# Patient Record
Sex: FEMALE | Race: WHITE | Employment: OTHER | URBAN - METROPOLITAN AREA
[De-identification: names, ages, dates, MRNs, and addresses within clinical notes are randomized per-mention and may not be internally consistent; named-entity substitution may affect disease eponyms.]

---

## 2018-10-10 ENCOUNTER — HOSPITAL ENCOUNTER (OUTPATIENT)
Dept: SURGERY | Age: 78
Discharge: HOME | End: 2018-10-10
Payer: MEDICARE

## 2018-10-10 VITALS
WEIGHT: 125.06 LBS | DIASTOLIC BLOOD PRESSURE: 59 MMHG | OXYGEN SATURATION: 100 % | HEIGHT: 61 IN | SYSTOLIC BLOOD PRESSURE: 145 MMHG | RESPIRATION RATE: 16 BRPM | BODY MASS INDEX: 23.61 KG/M2 | TEMPERATURE: 98.3 F | HEART RATE: 90 BPM

## 2018-10-10 LAB
ANION GAP SERPL CALC-SCNC: 6 MMOL/L (ref 7–16)
ATRIAL RATE: 80 BPM
BACTERIA SPEC CULT: ABNORMAL
BUN SERPL-MCNC: 14 MG/DL (ref 8–23)
CALCIUM SERPL-MCNC: 9.1 MG/DL (ref 8.3–10.4)
CALCULATED P AXIS, ECG09: 76 DEGREES
CALCULATED R AXIS, ECG10: 81 DEGREES
CALCULATED T AXIS, ECG11: 76 DEGREES
CHLORIDE SERPL-SCNC: 107 MMOL/L (ref 98–107)
CO2 SERPL-SCNC: 28 MMOL/L (ref 21–32)
CREAT SERPL-MCNC: 0.71 MG/DL (ref 0.6–1)
DIAGNOSIS, 93000: NORMAL
ERYTHROCYTE [DISTWIDTH] IN BLOOD BY AUTOMATED COUNT: 12.4 %
GLUCOSE SERPL-MCNC: 84 MG/DL (ref 65–100)
HCT VFR BLD AUTO: 42.5 % (ref 35.8–46.3)
HGB BLD-MCNC: 13.5 G/DL (ref 11.7–15.4)
MCH RBC QN AUTO: 29.9 PG (ref 26.1–32.9)
MCHC RBC AUTO-ENTMCNC: 31.8 G/DL (ref 31.4–35)
MCV RBC AUTO: 94 FL (ref 79.6–97.8)
NRBC # BLD: 0 K/UL (ref 0–0.2)
P-R INTERVAL, ECG05: 168 MS
PLATELET # BLD AUTO: 256 K/UL (ref 150–450)
PMV BLD AUTO: 9.8 FL (ref 9.4–12.3)
POTASSIUM SERPL-SCNC: 4.3 MMOL/L (ref 3.5–5.1)
Q-T INTERVAL, ECG07: 380 MS
QRS DURATION, ECG06: 82 MS
QTC CALCULATION (BEZET), ECG08: 438 MS
RBC # BLD AUTO: 4.52 M/UL (ref 4.05–5.2)
SERVICE CMNT-IMP: ABNORMAL
SODIUM SERPL-SCNC: 141 MMOL/L (ref 136–145)
VENTRICULAR RATE, ECG03: 80 BPM
WBC # BLD AUTO: 6.5 K/UL (ref 4.3–11.1)

## 2018-10-10 PROCEDURE — 85027 COMPLETE CBC AUTOMATED: CPT

## 2018-10-10 PROCEDURE — 80048 BASIC METABOLIC PNL TOTAL CA: CPT

## 2018-10-10 PROCEDURE — 93005 ELECTROCARDIOGRAM TRACING: CPT | Performed by: ANESTHESIOLOGY

## 2018-10-10 PROCEDURE — 87641 MR-STAPH DNA AMP PROBE: CPT

## 2018-10-10 RX ORDER — CALCIUM CARBONATE 600 MG
600 TABLET ORAL DAILY
COMMUNITY

## 2018-10-10 RX ORDER — GUAIFENESIN 100 MG/5ML
81 LIQUID (ML) ORAL DAILY
COMMUNITY

## 2018-10-10 NOTE — PERIOP NOTES
Recent Results (from the past 12 hour(s)) CBC W/O DIFF Collection Time: 10/10/18 11:36 AM  
Result Value Ref Range WBC 6.5 4.3 - 11.1 K/uL  
 RBC 4.52 4.05 - 5.2 M/uL  
 HGB 13.5 11.7 - 15.4 g/dL HCT 42.5 35.8 - 46.3 % MCV 94.0 79.6 - 97.8 FL  
 MCH 29.9 26.1 - 32.9 PG  
 MCHC 31.8 31.4 - 35.0 g/dL  
 RDW 12.4 % PLATELET 132 408 - 809 K/uL MPV 9.8 9.4 - 12.3 FL ABSOLUTE NRBC 0.00 0.0 - 0.2 K/uL METABOLIC PANEL, BASIC Collection Time: 10/10/18 11:36 AM  
Result Value Ref Range Sodium 141 136 - 145 mmol/L Potassium 4.3 3.5 - 5.1 mmol/L Chloride 107 98 - 107 mmol/L  
 CO2 28 21 - 32 mmol/L Anion gap 6 (L) 7 - 16 mmol/L Glucose 84 65 - 100 mg/dL BUN 14 8 - 23 MG/DL Creatinine 0.71 0.6 - 1.0 MG/DL  
 GFR est AA >60 >60 ml/min/1.73m2 GFR est non-AA >60 >60 ml/min/1.73m2 Calcium 9.1 8.3 - 10.4 MG/DL  
MSSA/MRSA SC BY PCR, NASAL SWAB Collection Time: 10/10/18 11:36 AM  
Result Value Ref Range Special Requests: NO SPECIAL REQUESTS Culture result: (A) MRSA target DNA not detected, SA target DNA detected. A MRSA negative, SA positive test result does not preclude MRSA nasal colonization.

## 2018-10-10 NOTE — PERIOP NOTES
Patient verified name and . Patient provided medical/health information and PTA medications to the best of their ability. TYPE  CASE:lll Order for consent No found in EHR and matches case posting. Labs per surgeon:none. Labs per anesthesia protocol: CBC, BMP, EKG, MRSA swab, T&S on DOS. EKG  :  Today, NSR. Patient denies any cardiac hx Patient provided with and instructed on education handouts including Guide to Surgery, blood transfusions, pain management, and hand hygiene for the family and community, and [facility] brochure. Antibacterial soap and Hibiclens instructions given per hospital policy. Instructed patient to continue previous medications as prescribed prior to surgery unless otherwise directed and to take the following medications the day of surgery according to anesthesia guidelines : ASA 81 mg . Instructed patient to hold  the following medications: Vitamins / Supplements. Original medication prescription bottles were visualized during patient appointment. Patient teach back successful and patient demonstrates knowledge of instruction.

## 2018-10-10 NOTE — PERIOP NOTES
Mupirocin ointment called in to [facility] at [phone]. Called patient and instructed to apply nasally BID starting tonight 10/10/2018 at bedtime for a total of 11 doses and to swab nose the morning of surgery. Patient voices understanding.

## 2018-10-14 NOTE — H&P
[place] History and physical 
 
Subjective Problem List:. 1)Right ankle pain. 2)Great toe pain on right foot. 3)posterior tibialis tendonitis. 4)Hallux Rigidus. 5)right shoulder pain/impingement. 6)neck pain. 7)left ankle pain. 8)bilateral hip pain/bursitis. 9)Right knee pain/DJD/ patella chondromalacia. 10) left thumb pain. 11) right hip pain. This patient presents today for evaluation of right knee pain. The patient comes in today for evaluation, history and physical, and surgical consent signing. The surgical procedure was reviewed in detail with the patient. The risks, including but not limited to anesthesia, infection, deep vein thrombosis, pulmonary embolus, injury to vessels, tendons, and nerves, paralysis, stroke, heart attack, loss of limb, and death were discussed. The patient understands the post operative course and all questions were answered. No guarantees are made and all alternatives are given. The patient wishes to proceed with the surgery. Appropriate literature and relevant material was reviewed with the patient. Surgical procedure: right total knee replacement Allergies:  Actonel, boniva, codeine, penicillin, reclast, topamax Family health history: diabetes-father. Paula Chavez Major events: cholecystectomy; breast biopsy; BETTY/BSO; Cataracts; Hysterectomy 1989; left thumb surgery 2016. Ongoing medical problems: glaucoma; menopause; cataracts; migraines; IBS; cervical DDD; osteoporosis. Social history: Patient denies tobacco and ETOH use. Patient is . She is a homemaker. Review of systems:.  
 
General: Denies weight change, generally healthy,  change in strength or exercise tolerance. Paula Chavez Head: Denies headaches,  vertigo,  injury. Paula Chavez Eyes: Denies changes in vision. Ears:  Denies change in hearing. Nose: Denies epistaxis,  obstruction,  discharge. Paula Chavez Mouth: Denies dental difficulties. Neck: Denies pain or stiffness. Chest: Denies cough or SOB. Heart: Denies chest pains,  palpitations. Abdomen: Denies change in appetite  constipation or diarrhea. : Denies urinary urgency,  dysuria, change in nature of urine. Rayna Pan Neurologic: Denies weakness, seizures or paralysis. Psychiatric: Denies depressive symptoms, changes in sleep habits,  changes in thought content. Rayna Pan Objective Vital signs: Height 62 inches; Weight 123 lbs; /75 mmHg; Temp 97.5 F; Pulse 96 bpm; Oxygen Saturation 95 %. Rayna Pan Patient is a 70year old female who appears her given age and is in no apparent distress. Oriented to person, place, and time. Mood and affect are appropriate for age and situation. Assessment of respiratory effort reveals even and nonlabored respirations. GEN:NAD Lungs clear to auscultation bilaterally. Heart rate regular without murmur heard to auscultation. The patient exhibits an antalgic gait, and is able to get onto and off of the examination table. Right long leg x-ray (1 view, standing - CPT 81891) taken today reveal:. no mal alignment of the femur and tibia. Right knee x-rays (4 views, standing - CPT [id number]) taken 9-25-18 reveal: severe lateral joint space narrowing bone on bone. Moderate patella femoral joint space narrowing. Osteopenia. Right knee examination:. Inspection reveals no external signs of acute injury or trauma. No palpable cords. No warmth or erythema noted. Knee alignment: increased valgus alignment. Palpation reveals diffuse tenderness over the right knee, with focal tenderness noted over the lateral joint line. Knee flexion (active): 0 degrees, with pain. Knee flexion (active): 110 degrees, with pain. The right knee is stable to stressing in all planes. Patella exam: normal tracking. She is able to bear full weight on her right knee, with pain. Neurologic: Sensation is intact and symmetrical in all dermatomes. Rayna Pan Vascular: Peripheral pulses normal 2/2 lower extremities. Rayna Pan Coordination is good. Assessment DIAGNOSIS: 
      Pain in right knee [ICD-10: M25.561], [ICD-9: 719.46], [SNOMED: 40080408] Primary osteoarthritis of right knee [ICD-10: M17.11], [ICD-9: 715.16], [SNOMED: 942353577] Pre-op evaluation [ICD-10: Q36.901], [SNOMED: 773386431] Plan We discussed the pathophysiology of the diagnosis and options for treatment. We reviewed the conservative treatment options in detail. We discussed the surgical option(s) (right total knee replacement). We have talked about the complications of surgery, including the possibility of damage to nerves, arteries, vessels and tendons, bleeding, infection, the possibility of sustaining medical problems, even death. We have talked about the possibility that the condition may not improve after surgery  or that it could actually be worse. The patient seems to understand and accept these possible complications. The patient understands and wishes to proceed with surgery at this time. Informed surgical consent obtained. Surgery will be performed at [facility] on 10-15-18 All questions answered at this time. The patient knows to contact the office with any questions or concerns. VERIFICATION OF ANCILLARY DOCUMENTATION: The portions of the chart completed by ancillary personnel were reviewed by the physician. Rayna Pan RTC :  post-op. MEDICATIONS: 
      Latanoprost Ophthalmic 0.005% ophthalmic solution 1 gtt ou q hs Imitrex 100 mg oral tablet 1/2-1 po BID PRN Caltrate 600 with D (obsolete) 600 mg-400 intl units oral tablet 1 PO QD Vitamin D3 1000 intl units oral capsule one PO BID Questran 4 g/9 g oral powder for reconstitution 1 scoop QD Aspirin Low Dose 81 MG Oral Tablet Delayed Release

## 2018-10-15 ENCOUNTER — ANESTHESIA (OUTPATIENT)
Dept: SURGERY | Age: 78
DRG: 470 | End: 2018-10-15
Payer: MEDICARE

## 2018-10-15 ENCOUNTER — ANESTHESIA EVENT (OUTPATIENT)
Dept: SURGERY | Age: 78
DRG: 470 | End: 2018-10-15
Payer: MEDICARE

## 2018-10-15 ENCOUNTER — HOSPITAL ENCOUNTER (INPATIENT)
Age: 78
LOS: 2 days | Discharge: HOME HEALTH | DRG: 470 | End: 2018-10-17
Attending: ORTHOPAEDIC SURGERY | Admitting: ORTHOPAEDIC SURGERY
Payer: MEDICARE

## 2018-10-15 DIAGNOSIS — M17.11 PRIMARY OSTEOARTHRITIS OF RIGHT KNEE: Primary | ICD-10-CM

## 2018-10-15 LAB
ABO + RH BLD: NORMAL
BLOOD GROUP ANTIBODIES SERPL: NORMAL
SPECIMEN EXP DATE BLD: NORMAL

## 2018-10-15 PROCEDURE — 77030012894: Performed by: ORTHOPAEDIC SURGERY

## 2018-10-15 PROCEDURE — 76942 ECHO GUIDE FOR BIOPSY: CPT | Performed by: ORTHOPAEDIC SURGERY

## 2018-10-15 PROCEDURE — 77030019605

## 2018-10-15 PROCEDURE — 76060000034 HC ANESTHESIA 1.5 TO 2 HR: Performed by: ORTHOPAEDIC SURGERY

## 2018-10-15 PROCEDURE — 77030011208: Performed by: ORTHOPAEDIC SURGERY

## 2018-10-15 PROCEDURE — 77030006835 HC BLD SAW SAG STRY -B: Performed by: ORTHOPAEDIC SURGERY

## 2018-10-15 PROCEDURE — 74011250636 HC RX REV CODE- 250/636: Performed by: ORTHOPAEDIC SURGERY

## 2018-10-15 PROCEDURE — 77030020782 HC GWN BAIR PAWS FLX 3M -B: Performed by: ANESTHESIOLOGY

## 2018-10-15 PROCEDURE — 77030027138 HC INCENT SPIROMETER -A

## 2018-10-15 PROCEDURE — 86901 BLOOD TYPING SEROLOGIC RH(D): CPT

## 2018-10-15 PROCEDURE — 74011250637 HC RX REV CODE- 250/637: Performed by: ANESTHESIOLOGY

## 2018-10-15 PROCEDURE — C1713 ANCHOR/SCREW BN/BN,TIS/BN: HCPCS | Performed by: ORTHOPAEDIC SURGERY

## 2018-10-15 PROCEDURE — 76010000162 HC OR TIME 1.5 TO 2 HR INTENSV-TIER 1: Performed by: ORTHOPAEDIC SURGERY

## 2018-10-15 PROCEDURE — 74011250636 HC RX REV CODE- 250/636: Performed by: ANESTHESIOLOGY

## 2018-10-15 PROCEDURE — 74011250636 HC RX REV CODE- 250/636: Performed by: NURSE PRACTITIONER

## 2018-10-15 PROCEDURE — 77030003602 HC NDL NRV BLK BBMI -B: Performed by: ANESTHESIOLOGY

## 2018-10-15 PROCEDURE — 77030020263 HC SOL INJ SOD CL0.9% LFCR 1000ML

## 2018-10-15 PROCEDURE — 77030003666 HC NDL SPINAL BD -A: Performed by: ORTHOPAEDIC SURGERY

## 2018-10-15 PROCEDURE — 77030012893

## 2018-10-15 PROCEDURE — 77030003665 HC NDL SPN BBMI -A: Performed by: ANESTHESIOLOGY

## 2018-10-15 PROCEDURE — 74011250637 HC RX REV CODE- 250/637: Performed by: ORTHOPAEDIC SURGERY

## 2018-10-15 PROCEDURE — C1776 JOINT DEVICE (IMPLANTABLE): HCPCS | Performed by: ORTHOPAEDIC SURGERY

## 2018-10-15 PROCEDURE — 77030031139 HC SUT VCRL2 J&J -A: Performed by: ORTHOPAEDIC SURGERY

## 2018-10-15 PROCEDURE — 76010010054 HC POST OP PAIN BLOCK: Performed by: ORTHOPAEDIC SURGERY

## 2018-10-15 PROCEDURE — 77030007880 HC KT SPN EPDRL BBMI -B: Performed by: ANESTHESIOLOGY

## 2018-10-15 PROCEDURE — 77030008467 HC STPLR SKN COVD -B: Performed by: ORTHOPAEDIC SURGERY

## 2018-10-15 PROCEDURE — 74011000250 HC RX REV CODE- 250

## 2018-10-15 PROCEDURE — 77030013727 HC IRR FAN PULSVC ZIMM -B: Performed by: ORTHOPAEDIC SURGERY

## 2018-10-15 PROCEDURE — 76210000063 HC OR PH I REC FIRST 0.5 HR: Performed by: ORTHOPAEDIC SURGERY

## 2018-10-15 PROCEDURE — 74011250636 HC RX REV CODE- 250/636

## 2018-10-15 PROCEDURE — 77030018836 HC SOL IRR NACL ICUM -A: Performed by: ORTHOPAEDIC SURGERY

## 2018-10-15 PROCEDURE — 0SRC0J9 REPLACEMENT OF RIGHT KNEE JOINT WITH SYNTHETIC SUBSTITUTE, CEMENTED, OPEN APPROACH: ICD-10-PCS | Performed by: ORTHOPAEDIC SURGERY

## 2018-10-15 PROCEDURE — 77030000032 HC CUF TRNQT ZIMM -B: Performed by: ORTHOPAEDIC SURGERY

## 2018-10-15 PROCEDURE — 65270000029 HC RM PRIVATE

## 2018-10-15 DEVICE — LEGION CR NONPOROUS FEMORAL SZ  4 RT
Type: IMPLANTABLE DEVICE | Site: KNEE | Status: FUNCTIONAL
Brand: LEGION

## 2018-10-15 DEVICE — GEN II 7.5MM RESUR PAT 35MM
Type: IMPLANTABLE DEVICE | Site: KNEE | Status: FUNCTIONAL
Brand: GENESIS II

## 2018-10-15 DEVICE — LEGION HIGHLY CROSS LINKED                                    POLYETHYLENE DISHED INSERT SIZE 3-4 9MM
Type: IMPLANTABLE DEVICE | Site: KNEE | Status: FUNCTIONAL
Brand: LEGION

## 2018-10-15 DEVICE — (D)CEMENT BNE HV R 40GM -- DUPE USE ITEM 353850: Type: IMPLANTABLE DEVICE | Site: KNEE | Status: FUNCTIONAL

## 2018-10-15 DEVICE — MOD GENII NONPOROUS TIB BASEPLATE                                    SZ3 RT W/O TPR
Type: IMPLANTABLE DEVICE | Site: KNEE | Status: FUNCTIONAL
Brand: GENESIS II

## 2018-10-15 RX ORDER — SODIUM CHLORIDE 0.9 % (FLUSH) 0.9 %
5-10 SYRINGE (ML) INJECTION EVERY 8 HOURS
Status: DISCONTINUED | OUTPATIENT
Start: 2018-10-15 | End: 2018-10-17 | Stop reason: HOSPADM

## 2018-10-15 RX ORDER — DEXAMETHASONE SODIUM PHOSPHATE 100 MG/10ML
10 INJECTION INTRAMUSCULAR; INTRAVENOUS ONCE
Status: COMPLETED | OUTPATIENT
Start: 2018-10-16 | End: 2018-10-16

## 2018-10-15 RX ORDER — DIPHENHYDRAMINE HCL 25 MG
25 CAPSULE ORAL
Status: DISCONTINUED | OUTPATIENT
Start: 2018-10-15 | End: 2018-10-17 | Stop reason: HOSPADM

## 2018-10-15 RX ORDER — CELECOXIB 200 MG/1
200 CAPSULE ORAL ONCE
Status: COMPLETED | OUTPATIENT
Start: 2018-10-15 | End: 2018-10-15

## 2018-10-15 RX ORDER — NALOXONE HYDROCHLORIDE 0.4 MG/ML
.2-.4 INJECTION, SOLUTION INTRAMUSCULAR; INTRAVENOUS; SUBCUTANEOUS
Status: DISCONTINUED | OUTPATIENT
Start: 2018-10-15 | End: 2018-10-17 | Stop reason: HOSPADM

## 2018-10-15 RX ORDER — SODIUM CHLORIDE 0.9 % (FLUSH) 0.9 %
5-10 SYRINGE (ML) INJECTION AS NEEDED
Status: DISCONTINUED | OUTPATIENT
Start: 2018-10-15 | End: 2018-10-15 | Stop reason: HOSPADM

## 2018-10-15 RX ORDER — HYDROCODONE BITARTRATE AND ACETAMINOPHEN 5; 325 MG/1; MG/1
1 TABLET ORAL
Status: DISCONTINUED | OUTPATIENT
Start: 2018-10-15 | End: 2018-10-17 | Stop reason: HOSPADM

## 2018-10-15 RX ORDER — HYDROMORPHONE HYDROCHLORIDE 1 MG/ML
1 INJECTION, SOLUTION INTRAMUSCULAR; INTRAVENOUS; SUBCUTANEOUS
Status: DISCONTINUED | OUTPATIENT
Start: 2018-10-15 | End: 2018-10-17 | Stop reason: HOSPADM

## 2018-10-15 RX ORDER — ONDANSETRON 2 MG/ML
4 INJECTION INTRAMUSCULAR; INTRAVENOUS
Status: DISCONTINUED | OUTPATIENT
Start: 2018-10-15 | End: 2018-10-17 | Stop reason: HOSPADM

## 2018-10-15 RX ORDER — MIDAZOLAM HYDROCHLORIDE 1 MG/ML
2 INJECTION, SOLUTION INTRAMUSCULAR; INTRAVENOUS
Status: COMPLETED | OUTPATIENT
Start: 2018-10-15 | End: 2018-10-15

## 2018-10-15 RX ORDER — CHOLESTYRAMINE 4 G/4.8G
4 POWDER, FOR SUSPENSION ORAL DAILY
Status: DISCONTINUED | OUTPATIENT
Start: 2018-10-16 | End: 2018-10-17 | Stop reason: HOSPADM

## 2018-10-15 RX ORDER — ASPIRIN 325 MG
325 TABLET, DELAYED RELEASE (ENTERIC COATED) ORAL EVERY 12 HOURS
Status: DISCONTINUED | OUTPATIENT
Start: 2018-10-15 | End: 2018-10-17 | Stop reason: HOSPADM

## 2018-10-15 RX ORDER — SODIUM CHLORIDE 9 MG/ML
100 INJECTION, SOLUTION INTRAVENOUS CONTINUOUS
Status: DISPENSED | OUTPATIENT
Start: 2018-10-15 | End: 2018-10-17

## 2018-10-15 RX ORDER — SODIUM CHLORIDE, SODIUM LACTATE, POTASSIUM CHLORIDE, CALCIUM CHLORIDE 600; 310; 30; 20 MG/100ML; MG/100ML; MG/100ML; MG/100ML
100 INJECTION, SOLUTION INTRAVENOUS CONTINUOUS
Status: DISCONTINUED | OUTPATIENT
Start: 2018-10-15 | End: 2018-10-15 | Stop reason: HOSPADM

## 2018-10-15 RX ORDER — OXYCODONE HYDROCHLORIDE 5 MG/1
10 TABLET ORAL
Status: DISCONTINUED | OUTPATIENT
Start: 2018-10-15 | End: 2018-10-15 | Stop reason: HOSPADM

## 2018-10-15 RX ORDER — SODIUM CHLORIDE 0.9 % (FLUSH) 0.9 %
5-10 SYRINGE (ML) INJECTION AS NEEDED
Status: DISCONTINUED | OUTPATIENT
Start: 2018-10-15 | End: 2018-10-17 | Stop reason: HOSPADM

## 2018-10-15 RX ORDER — FENTANYL CITRATE 50 UG/ML
100 INJECTION, SOLUTION INTRAMUSCULAR; INTRAVENOUS ONCE
Status: COMPLETED | OUTPATIENT
Start: 2018-10-15 | End: 2018-10-15

## 2018-10-15 RX ORDER — CEFAZOLIN SODIUM/WATER 2 G/20 ML
2 SYRINGE (ML) INTRAVENOUS EVERY 8 HOURS
Status: COMPLETED | OUTPATIENT
Start: 2018-10-15 | End: 2018-10-16

## 2018-10-15 RX ORDER — SUMATRIPTAN 50 MG/1
100 TABLET, FILM COATED ORAL
Status: COMPLETED | OUTPATIENT
Start: 2018-10-15 | End: 2018-10-15

## 2018-10-15 RX ORDER — CELECOXIB 200 MG/1
200 CAPSULE ORAL EVERY 12 HOURS
Status: DISCONTINUED | OUTPATIENT
Start: 2018-10-15 | End: 2018-10-17 | Stop reason: HOSPADM

## 2018-10-15 RX ORDER — PROPOFOL 10 MG/ML
INJECTION, EMULSION INTRAVENOUS
Status: DISCONTINUED | OUTPATIENT
Start: 2018-10-15 | End: 2018-10-15 | Stop reason: HOSPADM

## 2018-10-15 RX ORDER — HYDROMORPHONE HYDROCHLORIDE 2 MG/ML
0.5 INJECTION, SOLUTION INTRAMUSCULAR; INTRAVENOUS; SUBCUTANEOUS
Status: DISCONTINUED | OUTPATIENT
Start: 2018-10-15 | End: 2018-10-15 | Stop reason: HOSPADM

## 2018-10-15 RX ORDER — LIDOCAINE HYDROCHLORIDE 20 MG/ML
INJECTION, SOLUTION EPIDURAL; INFILTRATION; INTRACAUDAL; PERINEURAL AS NEEDED
Status: DISCONTINUED | OUTPATIENT
Start: 2018-10-15 | End: 2018-10-15 | Stop reason: HOSPADM

## 2018-10-15 RX ORDER — OXYCODONE AND ACETAMINOPHEN 7.5; 325 MG/1; MG/1
1 TABLET ORAL
Status: DISCONTINUED | OUTPATIENT
Start: 2018-10-15 | End: 2018-10-17 | Stop reason: HOSPADM

## 2018-10-15 RX ORDER — ENOXAPARIN SODIUM 100 MG/ML
40 INJECTION SUBCUTANEOUS DAILY
Status: DISCONTINUED | OUTPATIENT
Start: 2018-10-16 | End: 2018-10-17 | Stop reason: HOSPADM

## 2018-10-15 RX ORDER — LIDOCAINE HYDROCHLORIDE 10 MG/ML
0.3 INJECTION INFILTRATION; PERINEURAL ONCE
Status: DISCONTINUED | OUTPATIENT
Start: 2018-10-15 | End: 2018-10-15 | Stop reason: HOSPADM

## 2018-10-15 RX ORDER — AMOXICILLIN 250 MG
2 CAPSULE ORAL DAILY
Status: DISCONTINUED | OUTPATIENT
Start: 2018-10-16 | End: 2018-10-17 | Stop reason: HOSPADM

## 2018-10-15 RX ORDER — BUPIVACAINE HYDROCHLORIDE 7.5 MG/ML
INJECTION, SOLUTION INTRASPINAL AS NEEDED
Status: DISCONTINUED | OUTPATIENT
Start: 2018-10-15 | End: 2018-10-15 | Stop reason: HOSPADM

## 2018-10-15 RX ORDER — ROPIVACAINE HYDROCHLORIDE 5 MG/ML
INJECTION, SOLUTION EPIDURAL; INFILTRATION; PERINEURAL AS NEEDED
Status: DISCONTINUED | OUTPATIENT
Start: 2018-10-15 | End: 2018-10-15 | Stop reason: HOSPADM

## 2018-10-15 RX ORDER — ACETAMINOPHEN 500 MG
1000 TABLET ORAL ONCE
Status: DISCONTINUED | OUTPATIENT
Start: 2018-10-15 | End: 2018-10-15 | Stop reason: HOSPADM

## 2018-10-15 RX ORDER — TRANEXAMIC ACID 100 MG/ML
INJECTION, SOLUTION INTRAVENOUS AS NEEDED
Status: DISCONTINUED | OUTPATIENT
Start: 2018-10-15 | End: 2018-10-15 | Stop reason: HOSPADM

## 2018-10-15 RX ORDER — CEFAZOLIN SODIUM/WATER 2 G/20 ML
2 SYRINGE (ML) INTRAVENOUS ONCE
Status: COMPLETED | OUTPATIENT
Start: 2018-10-15 | End: 2018-10-15

## 2018-10-15 RX ORDER — LATANOPROST 50 UG/ML
1 SOLUTION/ DROPS OPHTHALMIC DAILY
Status: DISCONTINUED | OUTPATIENT
Start: 2018-10-16 | End: 2018-10-17 | Stop reason: HOSPADM

## 2018-10-15 RX ADMIN — Medication 3 AMPULE: at 13:21

## 2018-10-15 RX ADMIN — SODIUM CHLORIDE, SODIUM LACTATE, POTASSIUM CHLORIDE, AND CALCIUM CHLORIDE: 600; 310; 30; 20 INJECTION, SOLUTION INTRAVENOUS at 16:30

## 2018-10-15 RX ADMIN — CELECOXIB 200 MG: 200 CAPSULE ORAL at 13:01

## 2018-10-15 RX ADMIN — ASPIRIN 325 MG: 325 TABLET, DELAYED RELEASE ORAL at 21:00

## 2018-10-15 RX ADMIN — TRANEXAMIC ACID 1000 MG: 100 INJECTION, SOLUTION INTRAVENOUS at 16:28

## 2018-10-15 RX ADMIN — Medication 5 ML: at 21:00

## 2018-10-15 RX ADMIN — Medication 2 G: at 15:38

## 2018-10-15 RX ADMIN — SODIUM CHLORIDE, SODIUM LACTATE, POTASSIUM CHLORIDE, AND CALCIUM CHLORIDE 100 ML/HR: 600; 310; 30; 20 INJECTION, SOLUTION INTRAVENOUS at 13:01

## 2018-10-15 RX ADMIN — OXYCODONE HYDROCHLORIDE AND ACETAMINOPHEN 1 TABLET: 7.5; 325 TABLET ORAL at 21:03

## 2018-10-15 RX ADMIN — PROPOFOL 50 MCG/KG/MIN: 10 INJECTION, EMULSION INTRAVENOUS at 15:18

## 2018-10-15 RX ADMIN — MIDAZOLAM HYDROCHLORIDE 2 MG: 2 INJECTION, SOLUTION INTRAMUSCULAR; INTRAVENOUS at 14:23

## 2018-10-15 RX ADMIN — TRANEXAMIC ACID 2000 MG: 100 INJECTION, SOLUTION INTRAVENOUS at 15:30

## 2018-10-15 RX ADMIN — SODIUM CHLORIDE 100 ML/HR: 900 INJECTION, SOLUTION INTRAVENOUS at 19:50

## 2018-10-15 RX ADMIN — SUMATRIPTAN SUCCINATE 100 MG: 50 TABLET ORAL at 21:48

## 2018-10-15 RX ADMIN — Medication 1 AMPULE: at 20:59

## 2018-10-15 RX ADMIN — ONDANSETRON 4 MG: 2 INJECTION INTRAMUSCULAR; INTRAVENOUS at 21:41

## 2018-10-15 RX ADMIN — LIDOCAINE HYDROCHLORIDE 60 MG: 20 INJECTION, SOLUTION EPIDURAL; INFILTRATION; INTRACAUDAL; PERINEURAL at 15:18

## 2018-10-15 RX ADMIN — BUPIVACAINE HYDROCHLORIDE 1.8 ML: 7.5 INJECTION, SOLUTION INTRASPINAL at 15:25

## 2018-10-15 RX ADMIN — Medication 2 G: at 21:41

## 2018-10-15 RX ADMIN — ROPIVACAINE HYDROCHLORIDE 10 ML: 5 INJECTION, SOLUTION EPIDURAL; INFILTRATION; PERINEURAL at 14:26

## 2018-10-15 RX ADMIN — CELECOXIB 200 MG: 200 CAPSULE ORAL at 19:50

## 2018-10-15 RX ADMIN — FENTANYL CITRATE 100 MCG: 50 INJECTION INTRAMUSCULAR; INTRAVENOUS at 14:23

## 2018-10-15 NOTE — PERIOP NOTES
TRANSFER - OUT REPORT: 
 
Verbal report given to sara on Mely Disla  being transferred to 7th floorfor routine post - op Report consisted of patients Situation, Background, Assessment and  
Recommendations(SBAR). Information from the following report(s) OR Summary, Procedure Summary and Intake/Output was reviewed with the receiving nurse. Lines:  
Peripheral IV 10/15/18 Right Hand (Active) Site Assessment Clean, dry, & intact 10/15/2018  5:30 PM  
Phlebitis Assessment 0 10/15/2018  5:30 PM  
Infiltration Assessment 0 10/15/2018  5:30 PM  
Dressing Status Clean, dry, & intact 10/15/2018  5:30 PM  
Dressing Type Transparent 10/15/2018  5:30 PM  
Hub Color/Line Status Pink 10/15/2018  5:30 PM  
  
 
Opportunity for questions and clarification was provided. Patient transported with: 
 O2 @ 2 liters

## 2018-10-15 NOTE — PROGRESS NOTES
Spiritual Care visit. Initial Visit, Pre Surgery Consult. Visit and prayer before patient goes to surgery. Visit by Diana Castañeda M.Ed., Th.B. ,Staff

## 2018-10-15 NOTE — OP NOTES
Operative Report    Patient: Gus Moss MRN: [id number]  SSN: [id number]    YOB: 1940  Age: 70 y.o. Sex: female      Date of Surgery: 10/15/2018     Patient is a 70 y.o. female with a history of degenerative arthritis of the right knee, refractory to conservative treatment. Patient desires surgical treatment in the form of right total knee arthroplasty. The risks and complications were thoroughly discussed and informed consent was obtained. The patient understands these risks to include but not be limited to infection, wearing out, loosening, infection necessitating multiple procedures to get this resolved, which could even result in amputation, the possibility of blood clots, death, and the other less common but serious complications may occur. Informed consent was obtained. Preoperative Diagnosis: Osteoarthritis of right knee, unspecified osteoarthritis type [M17.11]     Postoperative Diagnosis: Osteoarthritis of right knee, unspecified osteoarthritis type      Surgeon(s) and Role:     * Rayne Loyd MD - Primary    Anesthesia: Spinal     Procedure: Procedure(s) (LRB):  RIGHT KNEE ARTHROPLASTY TOTAL   (Right) Procedure(s):  RIGHT KNEE ARTHROPLASTY TOTAL       Procedure in Detail:   The patient was taken to the Operating Room where spinal anesthesia was introduced. This provided satisfactory anesthesia during the procedure. Tourniquet was used on the upper right thigh over Webril padding and the right lower extremity was prepped and draped into a sterile field. A time-out was done to confirm the operative site, surgeon, and procedure. Upon verification by the surgical team, the procedure was begun. The extremity was exsanguinated by inflating the tourniquet to 275 mmHg. Standard median parapatellar incision was made, dissection carried down through the straight midline incision to the medial side of the extensor mechanism and arthrotomy was created. A medium effusion was evacuated. The knee was then flexed, patella everted laterally. Several small loose bodies were evacuated from the knee. Significant degenerative changes were seen tricompartmentally. The prepatellar fat pad was removed as well as anterior portions of the medial and lateral menisci with sharp dissection. Rongeur was utilized to remove the osteophyte circumference around the patella which was down significantly to bone, and was noted to be DJD. The saw set from the  instrumentation was used to make the undersurface osteotomy, drilling out three holes for the three pegs, a trial component was placed and trialed. The intramedullary guide was then used to perform the distal femoral resection and sizing. The chamfer cuts were made. The extramedullary guide was used for the tibia, the tibial cut was made, balancing the knee carefully. The ACL and PCL were removed. Then the trial components were placed , tibial component, repair of tibial articular surface and then drilled both the femur and the tibia. Three liters of Pulsavac lavage solution were then passed through the knee while a batch of Palacos cement was mixed on the back table. Once this was done, the knee was instrumented beginning with the tibia, the femur, and the patella in that order. The knee was held in full extension with a 9 mm trial liner. Once the cement had hardened and all extraneous bone cement was removed, the tourniquet was released to achieve hemostasis. Permanent 9 mm liner was locked in place and the knee had full extension, full flexion, good stability to varus and valgus stressing along the patellofemoral tract. The knee was then irrigated again. The knee was then closed in a layered fashion with #1 Vicryl in the extensor mechanism, 0 Vicryl deeply, 2-0 Vicryl and surgical clips in the skin. The patient tolerated the procedure well without any complications. 2 grams Ancef and TXA given at beginning of case.  One gram TXA given at close of wound.    Estimated Blood Loss:  50 ml    Tourniquet Time:   Total Tourniquet Time Documented:  Thigh (Right) - 42 minutes  Total: Thigh (Right) - 42 minutes        Implants:   Implant Name Type Inv.  Item Serial No.  Lot No. LRB No. Used Action   CEMENT BNE HV R 40GM -- PALACOS R [id number] - [id number]  CEMENT BNE HV R 40GM -- PALACOS R [id number]  [place] [id number] [id number] Right 1 Implanted   BASEPLT TIB GEN II SZ 3 RT -- LEON II - [id number]  BASEPLT TIB GEN II SZ 3 RT -- LEON II  [employer] [id number] Right 1 Implanted   FEM CR NP LEGION CR NP SZ 4 RT -- LEON II SPECTRON - [id number]  FEM CR NP LEGION CR NP SZ 4 RT -- LEON II SPECTRON  [employer] [id number] Right 1 Implanted   PAT RESURF GEN II 7.5X35MM -- ANATOMIC LEON II - [id number]  PAT RESURF GEN II 7.5X35MM -- ANATOMIC LEON II  [employer] [id number] Right 1 Implanted   INSERT LGN XLPE [facility] SZ3-4 9MM --  - [id number]   INSERT LGN XLPE [facility] SZ3-4 9MM --    [employer] [id number] Right 1 Implanted               Specimens: * No specimens in log *        Signed By:  Brayan Sinclair MD     October 15, 2018

## 2018-10-15 NOTE — H&P
History and Physical Updated with no interval change. Misael Vides MD History and Physical Updated with no interval change.  Misael Vides MD

## 2018-10-15 NOTE — ANESTHESIA POSTPROCEDURE EVALUATION
Post-Anesthesia Evaluation and Assessment Patient: Lino Record MRN: [id number]  SSN: [id number] YOB: 1940  Age: 70 y.o. Sex: female Cardiovascular Function/Vital Signs Visit Vitals  /60 (BP 1 Location: Left arm, BP Patient Position: At rest)  Pulse 78  Temp 36.6 °C (97.8 °F)  Resp 12  Ht 5' 1\" (1.549 m)  Wt 55.9 kg (123 lb 4 oz)  SpO2 100%  BMI 23.29 kg/m2 Patient is status post spinal anesthesia for Procedure(s): RIGHT KNEE ARTHROPLASTY TOTAL  . Nausea/Vomiting: None Postoperative hydration reviewed and adequate. Pain: 
Pain Scale 1: Numeric (0 - 10) (10/15/18 1730) Pain Intensity 1: 0 (10/15/18 1730) Managed Neurological Status:  
Neuro (WDL): Exceptions to WDL (10/15/18 1730) Neuro Neurologic State: Alert (10/15/18 1730) Orientation Level: Oriented X4 (10/15/18 1730) Cognition: Follows commands (10/15/18 1730) Speech: Clear (10/15/18 1730) LUE Motor Response: Purposeful (10/15/18 1730) LLE Motor Response: Weak (10/15/18 1730) RUE Motor Response: Purposeful (10/15/18 1730) RLE Motor Response: Weak (10/15/18 1730) Block resolving Mental Status and Level of Consciousness: Alert and oriented Pulmonary Status:  
O2 Device: Nasal cannula (10/15/18 1725) Adequate oxygenation and airway patent Complications related to anesthesia: None Post-anesthesia assessment completed. No concerns Signed By: Michael Albert MD   
 October 15, 2018

## 2018-10-15 NOTE — PROGRESS NOTES
10/15/18 1850 Dual Skin Pressure Injury Assessment Dual Skin Pressure Injury Assessment WDL Second Care Provider (Based on [address] [place] [address]) Nanette Ritchie, [facility] Skin Integumentary Skin Integumentary (WDL) X Skin Color Appropriate for ethnicity Skin Condition/Temp Warm;Dry Skin Integrity Incision (comment) 
(RLE) Turgor Epidermis thin w/ loss of subcut tissue Wound Prevention and Protection Methods Orientation of Wound Prevention Posterior Location of Wound Prevention Sacrum/Coccyx Dressing Present  Yes (Applied) Dressing Status Intact (Applied) Wound Offloading (Prevention Methods) Bed, pressure redistribution/air;Bed, pressure reduction mattress; Foam silicone;Pillows;Repositioning;Turning

## 2018-10-15 NOTE — ANESTHESIA PREPROCEDURE EVALUATION
Anesthetic History PONV Review of Systems / Medical History Patient summary reviewed and pertinent labs reviewed Pulmonary Within defined limits Neuro/Psych Headaches Cardiovascular Exercise tolerance: >4 METS 
  
GI/Hepatic/Renal 
  
 
 
 
 
 
Comments: IBS Endo/Other Within defined limits Other Findings Physical Exam 
 
Airway Mallampati: III 
TM Distance: 4 - 6 cm Neck ROM: normal range of motion Mouth opening: Normal 
 
 Cardiovascular Rhythm: regular Rate: normal 
 
 
 
 Dental 
No notable dental hx Pulmonary Breath sounds clear to auscultation Abdominal 
 
 
 
 Other Findings Anesthetic Plan ASA: 2 Anesthesia type: spinal 
 
 
Post-op pain plan if not by surgeon: peripheral nerve block single Anesthetic plan and risks discussed with: Patient and Family

## 2018-10-15 NOTE — ANESTHESIA PROCEDURE NOTES
Spinal Block Start time: 10/15/2018 3:20 PM 
End time: 10/15/2018 3:25 PM 
Performed by: Alli Zapata Authorized by: Alli Zapata Pre-procedure: 
 
Timeout Time: 15:18 Spinal Block:  
Patient Position:  Seated Prep Region:  Lumbar Prep: chlorhexidine and patient draped Location:  L3-4 Technique:  Single shot Local:  Lidocaine 1% Local Dose (mL):  3 Needle:  
Needle Type:  Quincke Needle Gauge:  25 G Attempts:  1 Events: CSF confirmed, no blood with aspiration and no paresthesia Events comment:  Blood tinged CSF that cleared with aspiration and injection Assessment: 
Insertion:  Uncomplicated Patient tolerance:  Patient tolerated the procedure well with no immediate complications

## 2018-10-15 NOTE — ANESTHESIA PROCEDURE NOTES
Adductor canal block with ultrasound Start time: 10/15/2018 2:23 PM 
End time: 10/15/2018 2:26 PM 
Performed by: Jessy Simmons Authorized by: Jessy Simmons Pre-procedure: Indications: at surgeon's request and post-op pain management Preanesthetic Checklist: patient identified, risks and benefits discussed, site marked, timeout performed, anesthesia consent given and patient being monitored Timeout Time: 14:23 Block Type:  
Block Type: Adductor canal 
Laterality:  Right Monitoring:  Continuous pulse ox, frequent vital sign checks, heart rate, oxygen and responsive to questions Injection Technique:  Single shot Procedures: ultrasound guided Patient Position: supine Prep: chlorhexidine Location:  Mid thigh Needle Gauge:  20 G Needle Localization:  Ultrasound guidance Medication Injected:  0.25% 
ropivacaine Volume (mL):  20 Assessment: 
Number of attempts:  1 Injection Assessment:  Incremental injection every 5 mL, local visualized surrounding nerve on ultrasound, negative aspiration for blood, no intravascular symptoms, no paresthesia and ultrasound image on chart Patient tolerance:  Patient tolerated the procedure well with no immediate complications

## 2018-10-16 LAB — HGB BLD-MCNC: 11.7 G/DL (ref 11.7–15.4)

## 2018-10-16 PROCEDURE — 74011000250 HC RX REV CODE- 250: Performed by: ORTHOPAEDIC SURGERY

## 2018-10-16 PROCEDURE — 97116 GAIT TRAINING THERAPY: CPT

## 2018-10-16 PROCEDURE — 97110 THERAPEUTIC EXERCISES: CPT

## 2018-10-16 PROCEDURE — 36415 COLL VENOUS BLD VENIPUNCTURE: CPT

## 2018-10-16 PROCEDURE — 74011250637 HC RX REV CODE- 250/637: Performed by: ORTHOPAEDIC SURGERY

## 2018-10-16 PROCEDURE — 65270000029 HC RM PRIVATE

## 2018-10-16 PROCEDURE — 97535 SELF CARE MNGMENT TRAINING: CPT

## 2018-10-16 PROCEDURE — 77030020263 HC SOL INJ SOD CL0.9% LFCR 1000ML

## 2018-10-16 PROCEDURE — 74011250637 HC RX REV CODE- 250/637: Performed by: NURSE PRACTITIONER

## 2018-10-16 PROCEDURE — 74011250636 HC RX REV CODE- 250/636: Performed by: ORTHOPAEDIC SURGERY

## 2018-10-16 PROCEDURE — 97161 PT EVAL LOW COMPLEX 20 MIN: CPT

## 2018-10-16 PROCEDURE — 85018 HEMOGLOBIN: CPT

## 2018-10-16 PROCEDURE — 74011250636 HC RX REV CODE- 250/636: Performed by: NURSE PRACTITIONER

## 2018-10-16 PROCEDURE — 74011000250 HC RX REV CODE- 250: Performed by: NURSE PRACTITIONER

## 2018-10-16 PROCEDURE — 97165 OT EVAL LOW COMPLEX 30 MIN: CPT

## 2018-10-16 PROCEDURE — 97530 THERAPEUTIC ACTIVITIES: CPT

## 2018-10-16 RX ORDER — TRAMADOL HYDROCHLORIDE 50 MG/1
50 TABLET ORAL
Status: DISCONTINUED | OUTPATIENT
Start: 2018-10-16 | End: 2018-10-17 | Stop reason: HOSPADM

## 2018-10-16 RX ORDER — PROMETHAZINE HYDROCHLORIDE 25 MG/1
25 TABLET ORAL
Status: DISCONTINUED | OUTPATIENT
Start: 2018-10-16 | End: 2018-10-17 | Stop reason: HOSPADM

## 2018-10-16 RX ADMIN — OXYCODONE HYDROCHLORIDE AND ACETAMINOPHEN 1 TABLET: 7.5; 325 TABLET ORAL at 08:48

## 2018-10-16 RX ADMIN — ASPIRIN 325 MG: 325 TABLET, DELAYED RELEASE ORAL at 21:06

## 2018-10-16 RX ADMIN — DEXAMETHASONE SODIUM PHOSPHATE 10 MG: 10 INJECTION INTRAMUSCULAR; INTRAVENOUS at 12:25

## 2018-10-16 RX ADMIN — CHOLESTYRAMINE 4 G: 4 POWDER, FOR SUSPENSION ORAL at 08:48

## 2018-10-16 RX ADMIN — SENNOSIDES AND DOCUSATE SODIUM 2 TABLET: 8.6; 5 TABLET ORAL at 08:48

## 2018-10-16 RX ADMIN — Medication 5 ML: at 17:11

## 2018-10-16 RX ADMIN — Medication 2 G: at 05:34

## 2018-10-16 RX ADMIN — SODIUM CHLORIDE 100 ML/HR: 900 INJECTION, SOLUTION INTRAVENOUS at 05:38

## 2018-10-16 RX ADMIN — ENOXAPARIN SODIUM 40 MG: 40 INJECTION, SOLUTION INTRAVENOUS; SUBCUTANEOUS at 08:48

## 2018-10-16 RX ADMIN — HYDROCODONE BITARTRATE AND ACETAMINOPHEN 1 TABLET: 5; 325 TABLET ORAL at 17:10

## 2018-10-16 RX ADMIN — Medication 1 AMPULE: at 21:06

## 2018-10-16 RX ADMIN — OXYCODONE HYDROCHLORIDE AND ACETAMINOPHEN 1 TABLET: 7.5; 325 TABLET ORAL at 03:38

## 2018-10-16 RX ADMIN — PROMETHAZINE HYDROCHLORIDE 25 MG: 25 TABLET ORAL at 21:13

## 2018-10-16 RX ADMIN — PROMETHAZINE HYDROCHLORIDE 12.5 MG: 25 INJECTION INTRAMUSCULAR; INTRAVENOUS at 11:28

## 2018-10-16 RX ADMIN — ONDANSETRON 4 MG: 2 INJECTION INTRAMUSCULAR; INTRAVENOUS at 08:48

## 2018-10-16 RX ADMIN — CELECOXIB 200 MG: 200 CAPSULE ORAL at 18:42

## 2018-10-16 RX ADMIN — ASPIRIN 325 MG: 325 TABLET, DELAYED RELEASE ORAL at 08:50

## 2018-10-16 RX ADMIN — SODIUM CHLORIDE 100 ML/HR: 900 INJECTION, SOLUTION INTRAVENOUS at 17:10

## 2018-10-16 RX ADMIN — TRAMADOL HYDROCHLORIDE 50 MG: 50 TABLET, FILM COATED ORAL at 21:06

## 2018-10-16 RX ADMIN — LATANOPROST 1 DROP: 50 SOLUTION OPHTHALMIC at 09:42

## 2018-10-16 RX ADMIN — TRAMADOL HYDROCHLORIDE 50 MG: 50 TABLET, FILM COATED ORAL at 12:25

## 2018-10-16 RX ADMIN — Medication 5 ML: at 21:06

## 2018-10-16 RX ADMIN — HYDROMORPHONE HYDROCHLORIDE 1 MG: 1 INJECTION, SOLUTION INTRAMUSCULAR; INTRAVENOUS; SUBCUTANEOUS at 00:02

## 2018-10-16 RX ADMIN — ONDANSETRON 4 MG: 2 INJECTION INTRAMUSCULAR; INTRAVENOUS at 03:38

## 2018-10-16 RX ADMIN — Medication 5 ML: at 05:35

## 2018-10-16 RX ADMIN — CELECOXIB 200 MG: 200 CAPSULE ORAL at 05:35

## 2018-10-16 RX ADMIN — Medication 1 AMPULE: at 08:48

## 2018-10-16 NOTE — PROGRESS NOTES
Problem: Self Care Deficits Care Plan (Adult) Goal: *Acute Goals and Plan of Care (Insert Text) 1. Pt will toilet with SBA 2. Pt will complete functional mobility for ADLs with SBA 3. Pt will complete lower body dressing with SBA using AE as needed 4. Pt will complete grooming and hygiene at sink with SBA 5. Pt will complete bed mobility with SBA in prep for ADLs Timeframe: 7 days OCCUPATIONAL THERAPY: Initial Assessment and Treatment Day: Day of Assessment 10/16/2018INPATIENT: Hospital Day: 2 [place] / Product Type: Medicare /  
  
NAME/AGE/GENDER: Donavon Ly is a 70 y.o. female PRIMARY DIAGNOSIS:  Osteoarthritis of right knee, unspecified osteoarthritis type [M17.11] <principal problem not specified> <principal problem not specified> Procedure(s) (LRB): 
RIGHT KNEE ARTHROPLASTY TOTAL   (Right) 1 Day Post-Op ICD-10: Treatment Diagnosis:  
 · Generalized Muscle Weakness (M62.81) Precautions/Allergies: WBAT Boniva [ibandronate]; Codeine hcl; and Penicillins ASSESSMENT:  
Ms. José Miguel Gonzales is s/p R TKA, WBAT. Pt lives with her  and elderly mother and was fully independent and driving at baseline, does not use an AD for mobility. Pt has all necessary DME including a lift chair to enter the house (pt stated that she normally puts her groceries on this and walks up the stairs) into the house. This session, pt presented with deficits in mobility, balance, and endurance impacting ADLs. Pt completed bed mobility with CGA, then stood with min assistance and transferred to the [facility] with min assistance using RW. Pt toileted with min assistance for clothing management, then transferred around the bed to the chair with CGA using RW. Pt educated on transfer technique, use of RW during functional approaches for ADLsm, and safe hand placement during ADLs.  Pt educated on adaptive techniques for LB dressing and donned pants with min assistance for balance, repeated cues for hand placement. Pt w/ decreased endurance for ADLs d/t N&V, RN aware. Pt is below her baseline and would benefit from skilled OT services to address deficits. This section established at most recent assessment PROBLEM LIST (Impairments causing functional limitations): 1. Decreased ADL/Functional Activities 2. Decreased Transfer Abilities 3. Decreased Balance 4. Increased Pain 5. Decreased Activity Tolerance INTERVENTIONS PLANNED: (Benefits and precautions of occupational therapy have been discussed with the patient.) 1. Activities of daily living training 2. Adaptive equipment training 3. Balance training 4. Clothing management 5. Donning&doffing training 6. Group therapy 7. Therapeutic activity 8. Therapeutic exercise TREATMENT PLAN: Frequency/Duration: Follow patient 6 times/ week to address above goals. Rehabilitation Potential For Stated Goals: Excellent RECOMMENDED REHABILITATION/EQUIPMENT: (at time of discharge pending progress): Due to the probability of continued deficits (see above) this patient will likely need continued skilled occupational therapy after discharge. Equipment:  
? None at this time OCCUPATIONAL PROFILE AND HISTORY:  
History of Present Injury/Illness (Reason for Referral): 
See H&P Past Medical History/Comorbidities: Ms. Rj White  has a past medical history of Arthritis; IBS (irritable bowel syndrome); Migraine (5/4/16); and Nausea & vomiting. She also has no past medical history of COPD; DEMENTIA; Endocrine disease; Gastrointestinal disorder; Infectious disease; Neurological disorder; Other ill-defined conditions(799.89); or Sleep disorder. Ms. Rj White  has a past surgical history that includes pr breast surgery procedure unlisted; hx cholecystectomy; hx hysterectomy; hx cataract removal (Bilateral); hx colonoscopy (2018); and hx orthopaedic (Left). Social History/Living Environment: Home Environment: Private residence # Steps to Enter: 4 Wheelchair Ramp:  (lift chair to enter house) One/Two Story Residence: One story Living Alone: No 
Support Systems: Spouse/Significant Other/Partner, Parent, Child(silvana) Patient Expects to be Discharged to[de-identified] Private residence Current DME Used/Available at Home: Grab bars, Lift chair, Raised toilet seat, Cane, straight, Commode, bedside, Shower chair, Walker, rolling (reacher) Tub or Shower Type: Shower Prior Level of Function/Work/Activity: 
Pt lives with her  and elderly mother and was fully independent and driving at baseline, does not use an AD for mobility. Pt has all necessary DME including a lift chair to enter the house (pt stated that she normally puts her groceries on this and walks up the stairs) into the house. Number of Personal Factors/Comorbidities that affect the Plan of Care: Brief history (0):  LOW COMPLEXITY ASSESSMENT OF OCCUPATIONAL PERFORMANCE[de-identified]  
Activities of Daily Living:  
Basic ADLs (From Assessment) Complex ADLs (From Assessment) Feeding: Independent Oral Facial Hygiene/Grooming: Contact guard assistance Bathing: Minimum assistance Upper Body Dressing: Independent Lower Body Dressing: Minimum assistance Toileting: Minimum assistance Instrumental ADL Meal Preparation: Minimum assistance Homemaking: Moderate assistance Medication Management: Independent Financial Management: Independent Grooming/Bathing/Dressing Activities of Daily Living Grooming Grooming Assistance: Contact guard assistance Cognitive Retraining Safety/Judgement: Awareness of environment; Fall prevention Toileting Bladder Hygiene: Contact guard assistance Clothing Management: Minimum assistance Functional Transfers Toilet Transfer : Minimum assistance Lower Body Dressing Assistance Pants With Elastic Waist: Minimum assistance Bed/Mat Mobility Supine to Sit: Contact guard assistance Bed to Chair: Contact guard assistance Scooting: Contact guard assistance Most Recent Physical Functioning:  
Gross Assessment: 
AROM: Within functional limits Strength: Within functional limits Posture: 
  
Balance: 
Sitting: Intact Standing: Impaired Standing - Static: Good;Constant support Standing - Dynamic : Fair Bed Mobility: 
Supine to Sit: Contact guard assistance Scooting: Contact guard assistance Wheelchair Mobility: 
  
Transfers: 
Stand to Sit: Minimum assistance Bed to Chair: Contact guard assistance Patient Vitals for the past 6 hrs: 
 BP BP Patient Position SpO2 Pulse 10/16/18 0737 111/63 At rest 98 % (!) 108 Mental Status Neurologic State: Alert Orientation Level: Oriented X4 Cognition: Appropriate decision making, Appropriate for age attention/concentration, Appropriate safety awareness, Follows commands Perception: Appears intact Perseveration: No perseveration noted Safety/Judgement: Awareness of environment, Fall prevention Physical Skills Involved: 
1. Balance 2. Activity Tolerance 3. Pain (acute) Cognitive Skills Affected (resulting in the inability to perform in a timely and safe manner): 1. none Psychosocial Skills Affected: 1. Habits/Routines Number of elements that affect the Plan of Care: 1-3:  LOW COMPLEXITY CLINICAL DECISION MAKIN [place] [id number] AM-PAC 6 Clicks Daily Activity Inpatient Short Form How much help from another person does the patient currently need. .. Total A Lot A Little None 1. Putting on and taking off regular lower body clothing? [] 1   [] 2   [x] 3   [] 4  
2. Bathing (including washing, rinsing, drying)? [] 1   [] 2   [x] 3   [] 4  
3. Toileting, which includes using toilet, bedpan or urinal?   [] 1   [] 2   [x] 3   [] 4  
4. Putting on and taking off regular upper body clothing? [] 1   [] 2   [] 3   [x] 4 5. Taking care of personal grooming such as brushing teeth? [] 1   [] 2   [] 3   [x] 4  
6. Eating meals? [] 1   [] 2   [] 3   [x] 4  
© 2007, Trustees of [address] [place] [address] [address] [address], under license to [employer]. All rights reserved Score:  Initial: 21 Most Recent: X (Date: -- ) Interpretation of Tool:  Represents activities that are increasingly more difficult (i.e. Bed mobility, Transfers, Gait). Score 24 23 22-20 19-15 14-10 9-7 6 Modifier CH CI CJ CK CL CM CN   
 
? Self Care:  
  - CURRENT STATUS: CJ - 20%-39% impaired, limited or restricted  - GOAL STATUS: CI - 1%-19% impaired, limited or restricted  - D/C STATUS:  ---------------To be determined--------------- [place] / Product Type: Medicare /   
 
Medical Necessity:    
· Patient demonstrates excellent rehab potential due to higher previous functional level. Reason for Services/Other Comments: 
· Patient continues to require skilled intervention due to decreased ADLs and functional performance from baseline. Use of outcome tool(s) and clinical judgement create a POC that gives a: LOW COMPLEXITY  
 
 
 
TREATMENT:  
(In addition to Assessment/Re-Assessment sessions the following treatments were rendered) Pre-treatment Symptoms/Complaints:   
Pain: Initial:  
Pain Intensity 1: 10 
Pain Location 1: Knee Pain Orientation 1: Right Pain Intervention(s) 1:  (pre-medicated)  Post Session:  8 Self Care: (10 min): Procedure(s) (per grid) utilized to improve and/or restore self-care/home management as related to dressing, toileting and grooming. Required minimal visual and verbal cueing to facilitate activities of daily living skills and compensatory activities. Braces/Orthotics/Lines/Etc:  
· IV 
· O2 Device: Nasal cannula Treatment/Session Assessment:   
· Response to Treatment:  N&V · Interdisciplinary Collaboration:  
o Occupational Therapist 
o Registered Nurse · After treatment position/precautions:  
o Up in chair 
o Bed/Chair-wheels locked 
o Call light within reach 
o Family at bedside · Compliance with Program/Exercises: compliant all of the time. · Recommendations/Intent for next treatment session: \"Next visit will focus on advancements to more challenging activities and reduction in assistance provided\". Total Treatment Duration: OT Patient Time In/Time Out Time In: 4956 Time Out: 3968 Richmond Andujar, OT

## 2018-10-16 NOTE — PHYSICIAN ADVISORY
Letter of Determination: Inpatient Status Appropriate This patient was originally hospitalized as Inpatient Status on 10/15/2018 for scheduled right total knee arthroplasty. This patient is appropriate for Inpatient Admission in accordance with CMS regulation Section 43 .3. Specifically, patient's stay is expected to be more than Two Midnights and was medically necessary. The patient's stay was medically necessary based on age > 72, osteoporosis. Consistent with CMS guidelines, patient meets for inpatient status. It is our recommendation that this patient's hospitalization status should be INPATIENT status.  
  
The final decision regarding the patient's hospitalization status depends on the attending physician's judgement. Marc Sidhu MD, FAREED, Physician Advisor [address] [place] [address].

## 2018-10-16 NOTE — PROGRESS NOTES
Spoke with Boo Schultz NP, about this patient's persistent nausea despite multiple Zofran doses. NP said to order Phenergan 12.5 mg IV once, Tramadol 50 mg PO Q6hrs, and it is okay to order Acetaminophen 650 mg Q 6 hrs PRN IF the patient requests this medication.

## 2018-10-16 NOTE — PROGRESS NOTES
Problem: Falls - Risk of 
Goal: *Absence of Falls Document Angelita Shadow Fall Risk and appropriate interventions in the flowsheet. Outcome: Progressing Towards Goal 
Fall Risk Interventions: 
Mobility Interventions: Bed/chair exit alarm, OT consult for ADLs, Patient to call before getting OOB, PT Consult for mobility concerns Medication Interventions: Patient to call before getting OOB, Evaluate medications/consider consulting pharmacy, Bed/chair exit alarm Elimination Interventions: Call light in reach, Bed/chair exit alarm, Patient to call for help with toileting needs

## 2018-10-16 NOTE — PROGRESS NOTES
Met with pt and daughter at bedside, pt states lives with spouse in 1 level home with approx 5 steps to enter states also has wc lift she can use to go in and out of the home, states elderly mother lives with then in the home and home is handicaped equiped. Has DME in home for use if needed. Prior to admission pt was independent with ADLs and driving. PT/OT working with pt, recommending [place] at DC 
 
CM will continue to follow for DC needs, will confirm with MD about home plans HH vs outpt. Care Management Interventions PCP Verified by CM: Yes Transition of Care Consult (CM Consult): Discharge Planning Current Support Network: Lives with Spouse, Own Home Confirm Follow Up Transport: Family Plan discussed with Pt/Family/Caregiver: Yes Freedom of Choice Offered: Yes Discharge Location Discharge Placement: Home

## 2018-10-16 NOTE — PROGRESS NOTES
Received orders from Sammi Love NP, to continue Phenergan for nausea but change to Phenergan 25 mg PO PRN Q 6-8 hrs.

## 2018-10-16 NOTE — PROGRESS NOTES
Problem: Mobility Impaired (Adult and Pediatric) Goal: *Acute Goals and Plan of Care (Insert Text) STG: 
(1.)Ms. Isak Espinosa will move from supine to sit and sit to supine  with STAND BY ASSIST within 3 treatment day(s). (2.)Ms. Isak Espinosa will transfer from bed to chair and chair to bed with STAND BY ASSIST using the least restrictive device within 3 treatment day(s). (3.)Ms. Isak Espinosa will ambulate with STAND BY ASSIST for 100 feet with the least restrictive device within 3 treatment day(s). LTG: 
(1.)Ms. Isak Espinosa will move from supine to sit and sit to supine  in bed with SUPERVISION within 7 treatment day(s). (2.)Ms. Isak Espinosa will transfer from bed to chair and chair to bed with SUPERVISION using the least restrictive device within 7 treatment day(s). (3.)Ms. Isak Espinosa will ambulate with SUPERVISION for 250+ feet with the least restrictive device within 7 treatment day(s). ________________________________________________________________________________________________ PHYSICAL THERAPY: Initial Assessment, Treatment Day: Day of Assessment, AM 10/16/2018INPATIENT: Hospital Day: 2 [place] / Product Type: Medicare /  
  
NAME/AGE/GENDER: Tonio Cobian is a 70 y.o. female PRIMARY DIAGNOSIS: Osteoarthritis of right knee, unspecified osteoarthritis type [M17.11] <principal problem not specified> <principal problem not specified> Procedure(s) (LRB): 
RIGHT KNEE ARTHROPLASTY TOTAL   (Right) 1 Day Post-Op ICD-10: Treatment Diagnosis:  
 · Generalized Muscle Weakness (M62.81) · Difficulty in walking, Not elsewhere classified (R26.2) Precaution/Allergies: 
Boniva [ibandronate]; Codeine hcl; and Penicillins WBAT R LE 
  
ASSESSMENT:  
Ms. Isak Espinosa is sitting up in bedside chair upon contact and agreeable to PT evaluation and treatment this morning.  Pt is A&O X 4 with reports of 8/10 pain prior to mobility with main complaints of n/v. Pt lives with her  in 1 story home with 3-4 steps to enter but lift chair available. Pt is independent with gait and ADLs, 0 falls, and drives. Patient is supine upon contact. She performed B LE exercises in supine. She was able to increase reps with all exercises. She demonstrated excellent improvement in gait distance this afternoon; ambulated 100' with RW and CGA to SBA. Progressing well toward goals. Nausea improved this afternoon. This section established at most recent assessment PROBLEM LIST (Impairments causing functional limitations): 1. Decreased Strength 2. Decreased ADL/Functional Activities 3. Decreased Transfer Abilities 4. Decreased Ambulation Ability/Technique 5. Decreased Balance 6. Increased Pain 7. Decreased Activity Tolerance 8. Decreased Pacing Skills 9. Increased Fatigue 10. Decreased Flexibility/Joint Mobility 11. Decreased [place] with Home Exercise Program 
 INTERVENTIONS PLANNED: (Benefits and precautions of physical therapy have been discussed with the patient.) 1. Balance Exercise 2. Bed Mobility 3. Family Education 4. Gait Training 5. Home Exercise Program (HEP) 6. Neuromuscular Re-education/Strengthening 7. Range of Motion (ROM) 8. Therapeutic Activites 9. Therapeutic Exercise/Strengthening 10. Transfer Training TREATMENT PLAN: Frequency/Duration: twice daily for duration of hospital stayRehabilitation Potential For Stated Goals: Good RECOMMENDED REHABILITATION/EQUIPMENT: (at time of discharge pending progress): Due to the probability of continued deficits (see above) this patient will likely need continued skilled physical therapy after discharge. Equipment:  
? Walkers, Type: Rolling Magdalena Singh HISTORY:  
History of Present Injury/Illness (Reason for Referral): S/p RIGHT KNEE ARTHROPLASTY TOTAL Past Medical History/Comorbidities: Ms. Raffi Quinonez  has a past medical history of Arthritis; IBS (irritable bowel syndrome); Migraine (5/4/16); and Nausea & vomiting. She also has no past medical history of COPD; DEMENTIA; Endocrine disease; Gastrointestinal disorder; Infectious disease; Neurological disorder; Other ill-defined conditions(799.89); or Sleep disorder. Ms. Raffi Quinonez  has a past surgical history that includes pr breast surgery procedure unlisted; hx cholecystectomy; hx hysterectomy; hx cataract removal (Bilateral); hx colonoscopy (2018); and hx orthopaedic (Left). Social History/Living Environment:  
Home Environment: Private residence # Steps to Enter: 4 Wheelchair Ramp:  (lift chair to enter house) One/Two Story Residence: One story Living Alone: No 
Support Systems: Spouse/Significant Other/Partner, Parent, Child(silvana) Patient Expects to be Discharged to[de-identified] Private residence Current DME Used/Available at Home: Grab bars, Lift chair, Raised toilet seat, Cane, straight, Commode, bedside, Shower chair, Walker, rolling (reacher) Tub or Shower Type: Shower Prior Level of Function/Work/Activity: 
Lives with , indep with all mobility, 0 falls, drives Number of Personal Factors/Comorbidities that affect the Plan of Care: 3+: HIGH COMPLEXITY EXAMINATION:  
Most Recent Physical Functioning:  
Gross Assessment: 
  
    RLE AROM 
R Knee Flexion: 55 (approximate-post op dressing) R Knee Extension: -10 Posture: 
  
Balance: 
Sitting: Intact Standing: Impaired; With support Standing - Static: Constant support;Good Standing - Dynamic : Fair Bed Mobility: 
Supine to Sit: Contact guard assistance;Minimum assistance Sit to Supine: Contact guard assistance;Minimum assistance Level of Assistance: Contact guard assistance Wheelchair Mobility: 
  
Transfers: 
Sit to Stand: Contact guard assistance Stand to Sit: Contact guard assistance Bed to Chair: Contact guard assistance Gait: 
Right Side Weight Bearing: As tolerated Base of Support: Narrowed Speed/Barbara: Slow Step Length: Left shortened;Right shortened Stance: Right decreased Gait Abnormalities: Antalgic;Decreased step clearance Distance (ft): 100 Feet (ft) Assistive Device: Walker, rolling Ambulation - Level of Assistance: Stand-by assistance;Contact guard assistance Interventions: Manual cues; Safety awareness training;Verbal cues Duration: 13 Minutes Body Structures Involved: 1. Heart 2. Lungs 3. Bones 4. Joints 5. Muscles 6. Ligaments Body Functions Affected: 1. Sensory/Pain 2. Cardio 3. Respiratory 4. Neuromusculoskeletal 
5. Movement Related Activities and Participation Affected: 1. General Tasks and Demands 2. Mobility 3. Self Care 4. Domestic Life 5. Interpersonal Interactions and Relationships 6. Community, Social and [place] Number of elements that affect the Plan of Care: 4+: HIGH COMPLEXITY CLINICAL PRESENTATION:  
Presentation: Evolving clinical presentation with changing clinical characteristics: MODERATE COMPLEXITY CLINICAL DECISION MAKING:  
[facility] 6 Clicks Basic Mobility Inpatient Short Form How much difficulty does the patient currently have. .. Unable A Lot A Little None 1. Turning over in bed (including adjusting bedclothes, sheets and blankets)? [] 1   [] 2   [x] 3   [] 4  
2. Sitting down on and standing up from a chair with arms ( e.g., wheelchair, bedside commode, etc.)   [] 1   [] 2   [x] 3   [] 4  
3. Moving from lying on back to sitting on the side of the bed? [] 1   [] 2   [x] 3   [] 4 How much help from another person does the patient currently need. .. Total A Lot A Little None 4. Moving to and from a bed to a chair (including a wheelchair)? [] 1   [] 2   [x] 3   [] 4  
5. Need to walk in hospital room? [] 1   [] 2   [x] 3   [] 4  
6. Climbing 3-5 steps with a railing?    [] 1   [x] 2   [] 3   [] 4  
 © 2007, Trustees of [address] [place] [address] [address] [address], under license to [employer]. All rights reserved Score:  Initial: 17 Most Recent: X (Date: -- ) Interpretation of Tool:  Represents activities that are increasingly more difficult (i.e. Bed mobility, Transfers, Gait). Score 24 23 22-20 19-15 14-10 9-7 6 Modifier CH CI CJ CK CL CM CN   
 
? Mobility - Walking and Moving Around:  
  - CURRENT STATUS: CK - 40%-59% impaired, limited or restricted  - GOAL STATUS: CJ - 20%-39% impaired, limited or restricted  - D/C STATUS:  ---------------To be determined--------------- [place] / Product Type: Medicare /   
 
Medical Necessity:    
· Patient is expected to demonstrate progress in strength, range of motion, balance, coordination and functional technique to decrease assistance required with gait, transfers, and functional mobility. Desire Copper Reason for Services/Other Comments: 
· Patient continues to require skilled intervention due to decreased strength, decreased balance, decreased functional tolerance, decreased cardiopulmonary endurance affecting participation in basic ADLs and functional tasks. Use of outcome tool(s) and clinical judgement create a POC that gives a: Clear prediction of patient's progress: LOW COMPLEXITY  
  
 
 
 
TREATMENT:  
(In addition to Assessment/Re-Assessment sessions the following treatments were rendered) Pre-treatment Symptoms/Complaints:  Nausea improved Pain: Initial:  
Pain Intensity 1: 4 Pain Location 1: Knee Pain Orientation 1: Right Pain Intervention(s) 1: Repositioned  Post Session:  4/10 In addition to evaluation: 
Therapeutic Exercise: (12 Minutes):  Exercises per grid below to improve mobility and strength. Required minimal visual, verbal, manual and tactile cues to promote proper body alignment, promote proper body posture and promote proper body mechanics.   Progressed range, repetitions and complexity of movement as indicated. Gait Training (13 Minutes):  Gait training to improve and/or restore physical functioning as related to dynamic movement of knee - right to improve functional gait pattern. Ambulated 100 Feet (ft) with Stand-by assistance;Contact guard assistance using a Walker, rolling and minimal Manual cues; Safety awareness training;Verbal cues related to their knee position and motion to promote proper body mechanics. Date: 
10/16/18 Date: 
 Date: 
  
ACTIVITY/EXERCISE AM PM AM PM AM PM  
Ankle pumps 10 X B 15 B Quad set 10 X B  15B Glut sets 10 X  15B Heel slides 10 X R AA 15 R AA Hip abdcution 10 X R AA 15 R AA      
SLR 10 X R AAA 15 R AA      
        
B = bilateral; AA = active assistive; A = active; P = passive Braces/Orthotics/Lines/Etc:  
· IV Treatment/Session Assessment:   
· Response to Treatment:  Ambulated 100 ft with RW and CGA-SBA · Interdisciplinary Collaboration:  
o Physical Therapist 
o Registered Nurse 
o Case management · After treatment position/precautions:  
o Supine in bed 
o Bed/Chair-wheels locked 
o Bed in low position 
o Call light within reach 
o Family at bedside 
o Side rails x 2  
· Compliance with Program/Exercises: compliant. · Recommendations/Intent for next treatment session: \"Next visit will focus on advancements to more challenging activities and reduction in assistance provided\". Total Treatment Duration: PT Patient Time In/Time Out Time In: 1440 Time Out: 7809 Phyllis Amezcua PT

## 2018-10-16 NOTE — PROGRESS NOTES
Pt and family requesting HH to be setup prior to DC, requested CM staff contact MD about orders, CM reached out to Dr Knox Manri and he states ok to home care, orders faxed to [facility], pt and family updated.

## 2018-10-16 NOTE — PROGRESS NOTES
Problem: Mobility Impaired (Adult and Pediatric) Goal: *Acute Goals and Plan of Care (Insert Text) STG: 
(1.)Ms. Rj White will move from supine to sit and sit to supine  with STAND BY ASSIST within 3 treatment day(s). (2.)Ms. Rj White will transfer from bed to chair and chair to bed with STAND BY ASSIST using the least restrictive device within 3 treatment day(s). (3.)Ms. Rj White will ambulate with STAND BY ASSIST for 100 feet with the least restrictive device within 3 treatment day(s). LTG: 
(1.)Ms. Rj White will move from supine to sit and sit to supine  in bed with SUPERVISION within 7 treatment day(s). (2.)Ms. Rj White will transfer from bed to chair and chair to bed with SUPERVISION using the least restrictive device within 7 treatment day(s). (3.)Ms. Rj White will ambulate with SUPERVISION for 250+ feet with the least restrictive device within 7 treatment day(s). ________________________________________________________________________________________________ PHYSICAL THERAPY: Initial Assessment, Treatment Day: Day of Assessment, AM 10/16/2018INPATIENT: Hospital Day: 2 [place] / Product Type: Medicare /  
  
NAME/AGE/GENDER: Manuel Velazquez is a 70 y.o. female PRIMARY DIAGNOSIS: Osteoarthritis of right knee, unspecified osteoarthritis type [M17.11] <principal problem not specified> <principal problem not specified> Procedure(s) (LRB): 
RIGHT KNEE ARTHROPLASTY TOTAL   (Right) 1 Day Post-Op ICD-10: Treatment Diagnosis:  
 · Generalized Muscle Weakness (M62.81) · Difficulty in walking, Not elsewhere classified (R26.2) Precaution/Allergies: 
Boniva [ibandronate]; Codeine hcl; and Penicillins WBAT R LE 
  
ASSESSMENT:  
Ms. Rj White is sitting up in bedside chair upon contact and agreeable to PT evaluation and treatment this morning.  Pt is A&O X 4 with reports of 8/10 pain prior to mobility with main complaints of n/v. Pt lives with her  in 1 story home with 3-4 steps to enter but lift chair available. Pt is independent with gait and ADLs, 0 falls, and drives. Pt performed STS to RW with CGA-Mata and ambulated 30 ft in room with RW and CGA-SBA. Pt ambulates with narrowed ALEXA and antalgic R gait pattern. Activity limited by nausea. Pt returned to sitting up in chair and continues with exercises requiring VC and TC for technique. Pt tolerated treatment well. Pt left sitting up with all needs met and within reach with daughter at bedside. Hayward Severs will benefit from skilled PT (medically necessary) to address decreased strength, decreased balance, decreased functional tolerance, decreased cardiopulmonary endurance affecting participation in basic ADLs and functional tasks. This section established at most recent assessment PROBLEM LIST (Impairments causing functional limitations): 1. Decreased Strength 2. Decreased ADL/Functional Activities 3. Decreased Transfer Abilities 4. Decreased Ambulation Ability/Technique 5. Decreased Balance 6. Increased Pain 7. Decreased Activity Tolerance 8. Decreased Pacing Skills 9. Increased Fatigue 10. Decreased Flexibility/Joint Mobility 11. Decreased Reading with Home Exercise Program 
 INTERVENTIONS PLANNED: (Benefits and precautions of physical therapy have been discussed with the patient.) 1. Balance Exercise 2. Bed Mobility 3. Family Education 4. Gait Training 5. Home Exercise Program (HEP) 6. Neuromuscular Re-education/Strengthening 7. Range of Motion (ROM) 8. Therapeutic Activites 9. Therapeutic Exercise/Strengthening 10. Transfer Training TREATMENT PLAN: Frequency/Duration: twice daily for duration of hospital stayRehabilitation Potential For Stated Goals: Good RECOMMENDED REHABILITATION/EQUIPMENT: (at time of discharge pending progress): Due to the probability of continued deficits (see above) this patient will likely need continued skilled physical therapy after discharge. Equipment:  
? Walkers, Type: Rolling  Pine HISTORY:  
History of Present Injury/Illness (Reason for Referral): S/p RIGHT KNEE ARTHROPLASTY TOTAL Past Medical History/Comorbidities: Ms. José Miguel Gonzales  has a past medical history of Arthritis; IBS (irritable bowel syndrome); Migraine (5/4/16); and Nausea & vomiting. She also has no past medical history of COPD; DEMENTIA; Endocrine disease; Gastrointestinal disorder; Infectious disease; Neurological disorder; Other ill-defined conditions(799.89); or Sleep disorder. Ms. José Miguel Gonzales  has a past surgical history that includes pr breast surgery procedure unlisted; hx cholecystectomy; hx hysterectomy; hx cataract removal (Bilateral); hx colonoscopy (2018); and hx orthopaedic (Left). Social History/Living Environment:  
Home Environment: Private residence # Steps to Enter: 4 Wheelchair Ramp:  (lift chair to enter house) One/Two Story Residence: One story Living Alone: No 
Support Systems: Spouse/Significant Other/Partner, Parent, Child(silvana) Patient Expects to be Discharged to[de-identified] Private residence Current DME Used/Available at Home: Grab bars, Lift chair, Raised toilet seat, Cane, straight, Commode, bedside, Shower chair, Walker, rolling (reacher) Tub or Shower Type: Shower Prior Level of Function/Work/Activity: 
Lives with , indep with all mobility, 0 falls, drives Number of Personal Factors/Comorbidities that affect the Plan of Care: 3+: HIGH COMPLEXITY EXAMINATION:  
Most Recent Physical Functioning:  
Gross Assessment: 
AROM: Generally decreased, functional 
Strength: Generally decreased, functional 
         
  
Posture: 
  
Balance: 
Standing - Static: Fair;Constant support Standing - Dynamic : Fair Bed Mobility: 
  
Wheelchair Mobility: 
  
Transfers: Sit to Stand: Contact guard assistance Stand to Sit: Contact guard assistance Gait: 
Right Side Weight Bearing: As tolerated Base of Support: Narrowed; Shift to left Speed/Barbara: Slow;Shuffled Step Length: Left shortened Stance: Right decreased Gait Abnormalities: Antalgic;Decreased step clearance Distance (ft): 30 Feet (ft) Assistive Device: Walker, rolling Ambulation - Level of Assistance: Contact guard assistance;Stand-by assistance Interventions: Safety awareness training; Tactile cues; Verbal cues Body Structures Involved: 1. Heart 2. Lungs 3. Bones 4. Joints 5. Muscles 6. Ligaments Body Functions Affected: 1. Sensory/Pain 2. Cardio 3. Respiratory 4. Neuromusculoskeletal 
5. Movement Related Activities and Participation Affected: 1. General Tasks and Demands 2. Mobility 3. Self Care 4. Domestic Life 5. Interpersonal Interactions and Relationships 6. Community, Social and [place] Number of elements that affect the Plan of Care: 4+: HIGH COMPLEXITY CLINICAL PRESENTATION:  
Presentation: Evolving clinical presentation with changing clinical characteristics: MODERATE COMPLEXITY CLINICAL DECISION MAKIN[address] [place], [place] [id number] AM-PAC 6 Clicks Basic Mobility Inpatient Short Form How much difficulty does the patient currently have. .. Unable A Lot A Little None 1. Turning over in bed (including adjusting bedclothes, sheets and blankets)? [] 1   [] 2   [x] 3   [] 4  
2. Sitting down on and standing up from a chair with arms ( e.g., wheelchair, bedside commode, etc.)   [] 1   [] 2   [x] 3   [] 4  
3. Moving from lying on back to sitting on the side of the bed? [] 1   [] 2   [x] 3   [] 4 How much help from another person does the patient currently need. .. Total A Lot A Little None 4. Moving to and from a bed to a chair (including a wheelchair)? [] 1   [] 2   [x] 3   [] 4  
5. Need to walk in hospital room?    [] 1   [] 2   [x] 3   [] 4  
 6.  Climbing 3-5 steps with a railing? [] 1   [x] 2   [] 3   [] 4  
© 2007, Trustees of [address] [place] [address] [address] [id number], under license to [employer]. All rights reserved Score:  Initial: 17 Most Recent: X (Date: -- ) Interpretation of Tool:  Represents activities that are increasingly more difficult (i.e. Bed mobility, Transfers, Gait). Score 24 23 22-20 19-15 14-10 9-7 6 Modifier CH CI CJ CK CL CM CN   
 
? Mobility - Walking and Moving Around:  
  - CURRENT STATUS: CK - 40%-59% impaired, limited or restricted  - GOAL STATUS: CJ - 20%-39% impaired, limited or restricted  - D/C STATUS:  ---------------To be determined--------------- [place] / [place] / Product Type: Medicare /   
 
Medical Necessity:    
· Patient is expected to demonstrate progress in strength, range of motion, balance, coordination and functional technique to decrease assistance required with gait, transfers, and functional mobility. Nanette Chin Reason for Services/Other Comments: 
· Patient continues to require skilled intervention due to decreased strength, decreased balance, decreased functional tolerance, decreased cardiopulmonary endurance affecting participation in basic ADLs and functional tasks. Use of outcome tool(s) and clinical judgement create a POC that gives a: Clear prediction of patient's progress: LOW COMPLEXITY  
  
 
 
 
TREATMENT:  
(In addition to Assessment/Re-Assessment sessions the following treatments were rendered) Pre-treatment Symptoms/Complaints:  N/v, post op pain 
Pain: Initial:  
Pain Intensity 1: 8  Post Session:  Increased with mobility, 9/10 In addition to evaluation: 
Therapeutic Exercise: (8 Minutes):  Exercises per grid below to improve mobility and strength. Required minimal visual, verbal, manual and tactile cues to promote proper body alignment, promote proper body posture and promote proper body mechanics.   Progressed range, repetitions and complexity of movement as indicated. Date: 
10/16/18 Date: 
 Date: 
  
ACTIVITY/EXERCISE AM PM AM PM AM PM  
Ankle pumps 10 X B Quad set 10 X B Glute  10 X Heel slides 10 X R AA Hip abdcution 10 X R AA       
ASLR 10 X R AAA B = bilateral; AA = active assistive; A = active; P = passive Braces/Orthotics/Lines/Etc:  
· IV Treatment/Session Assessment:   
· Response to Treatment:  Ambulated 30 ft with RW and CGA-SBA, limited by n/v 
· Interdisciplinary Collaboration:  
o Physical Therapist 
o Occupational Therapist 
o Registered Nurse 
o PT 
· After treatment position/precautions:  
o Up in chair 
o Bed/Chair-wheels locked 
o Bed in low position 
o Call light within reach 
o Family at bedside · Compliance with Program/Exercises: Will assess as treatment progresses. · Recommendations/Intent for next treatment session: \"Next visit will focus on advancements to more challenging activities and reduction in assistance provided\". Total Treatment Duration: PT Patient Time In/Time Out Time In: 1006 Time Out: 1031 Christine Carpenter

## 2018-10-16 NOTE — PROGRESS NOTES
Spoke with Dr Sudhir Panchal on unit this afternoon states pt should go home and follow up with his office for outpt services, pt and daughter made aware.

## 2018-10-17 VITALS
DIASTOLIC BLOOD PRESSURE: 56 MMHG | BODY MASS INDEX: 23.27 KG/M2 | RESPIRATION RATE: 18 BRPM | TEMPERATURE: 99.3 F | OXYGEN SATURATION: 98 % | HEART RATE: 93 BPM | WEIGHT: 123.25 LBS | SYSTOLIC BLOOD PRESSURE: 114 MMHG | HEIGHT: 61 IN

## 2018-10-17 PROCEDURE — 97110 THERAPEUTIC EXERCISES: CPT

## 2018-10-17 PROCEDURE — 74011250637 HC RX REV CODE- 250/637: Performed by: NURSE PRACTITIONER

## 2018-10-17 PROCEDURE — 74011250637 HC RX REV CODE- 250/637: Performed by: ORTHOPAEDIC SURGERY

## 2018-10-17 PROCEDURE — 97530 THERAPEUTIC ACTIVITIES: CPT

## 2018-10-17 PROCEDURE — 74011250636 HC RX REV CODE- 250/636: Performed by: ORTHOPAEDIC SURGERY

## 2018-10-17 PROCEDURE — 77030020263 HC SOL INJ SOD CL0.9% LFCR 1000ML

## 2018-10-17 RX ORDER — OXYCODONE AND ACETAMINOPHEN 7.5; 325 MG/1; MG/1
1 TABLET ORAL
Qty: 40 TAB | Refills: 0 | Status: SHIPPED | OUTPATIENT
Start: 2018-10-17

## 2018-10-17 RX ADMIN — CELECOXIB 200 MG: 200 CAPSULE ORAL at 05:32

## 2018-10-17 RX ADMIN — SENNOSIDES AND DOCUSATE SODIUM 2 TABLET: 8.6; 5 TABLET ORAL at 08:22

## 2018-10-17 RX ADMIN — CHOLESTYRAMINE 4 G: 4 POWDER, FOR SUSPENSION ORAL at 08:21

## 2018-10-17 RX ADMIN — ASPIRIN 325 MG: 325 TABLET, DELAYED RELEASE ORAL at 08:22

## 2018-10-17 RX ADMIN — TRAMADOL HYDROCHLORIDE 50 MG: 50 TABLET, FILM COATED ORAL at 09:32

## 2018-10-17 RX ADMIN — ENOXAPARIN SODIUM 40 MG: 40 INJECTION, SOLUTION INTRAVENOUS; SUBCUTANEOUS at 08:22

## 2018-10-17 RX ADMIN — Medication 1 AMPULE: at 08:22

## 2018-10-17 RX ADMIN — Medication 5 ML: at 05:32

## 2018-10-17 RX ADMIN — LATANOPROST 1 DROP: 50 SOLUTION OPHTHALMIC at 08:23

## 2018-10-17 NOTE — PROGRESS NOTES
Problem: Falls - Risk of 
Goal: *Absence of Falls Document Andrew Levels Fall Risk and appropriate interventions in the flowsheet. Outcome: Progressing Towards Goal 
Fall Risk Interventions: 
Mobility Interventions: Bed/chair exit alarm, OT consult for ADLs, Patient to call before getting OOB, PT Consult for mobility concerns Medication Interventions: Bed/chair exit alarm, Evaluate medications/consider consulting pharmacy, Patient to call before getting OOB Elimination Interventions: Call light in reach, Bed/chair exit alarm, Patient to call for help with toileting needs

## 2018-10-17 NOTE — PROGRESS NOTES
In accordance with Medicare Guidelines, a copy of the Important Letter from Medicare was presented to the patient in anticipation of expected discharge within 48 hours. An oral explanation was provided and all questions were answered. Patient signed one copy of the Important Letter from Medicare which was placed in the patient's medical chart, and a copy of the Important Letter from Medicare was provided to the patient. Care Managers were notified.

## 2018-10-17 NOTE — DISCHARGE SUMMARY
Total Joint Discharge Summary    Patient: Maria Eugenia Valencia MRN: [id number]  SSN: [id number]    YOB: 1940  Age: 70 y.o. Sex: female      Admit Date: 10/15/2018  Discharge Date:10/17/2018  Admitting Physician: Lena Krishnan MD   Discharge Physician: Lena Krishnan MD   Admission Diagnoses: Osteoarthritis of right knee, unspecified osteoarthritis type [M17.11]   Discharge Diagnoses:   Patient Active Problem List   Diagnosis Code    Right knee DJD M17.11     Surgeon: Surgeon(s):  Urban Francois MD   DVT Prophylaxis: Lovenox, ASA                                  YVES Cisnerose  Postoperative Complications: none detected  Last Hemoglobin:   Lab Results   Component Value Date/Time    HGB 11.7 10/16/2018 04:59 AM        Wound appears to be healing without any evidence of infection. Physical Therapy started on the day following surgery and progressed to independent ambulation with the aid of a walker. At the time of discharge, patient is able to go up and down stairs and has understanding of precautions needed following surgery. Discharged Disposition: Home    Discharge Instructions:   Anticoagulate with Ecotrin 325 mg orally once a day for 4 weeks   Resume pre-hospital diet             Resume home medications per medical continuation form      Ambulate with walker, appropriate total joint protocol   Follow up in office as scheduled    Call doctor immediately if temperature is greater kgkj130.5°F, increased pain, swelling, drainage.    If shortness of breath or chest pain, immediately go to the Emergency Department    Signed By: Lena Krishnan MD     October 17, 2018

## 2018-10-17 NOTE — PROGRESS NOTES
Problem: Mobility Impaired (Adult and Pediatric) Goal: *Acute Goals and Plan of Care (Insert Text) STG: 
(1.)Ms. Rich Olivier will move from supine to sit and sit to supine  with STAND BY ASSIST within 3 treatment day(s). (2.)Ms. Rich Olivier will transfer from bed to chair and chair to bed with STAND BY ASSIST using the least restrictive device within 3 treatment day(s). (3.)Ms. Rich Olivier will ambulate with STAND BY ASSIST for 100 feet with the least restrictive device within 3 treatment day(s). LTG: 
(1.)Ms. Rihc Olivier will move from supine to sit and sit to supine  in bed with SUPERVISION within 7 treatment day(s). (2.)Ms. Rich Olivier will transfer from bed to chair and chair to bed with SUPERVISION using the least restrictive device within 7 treatment day(s). (3.)Ms. Rich Olivier will ambulate with SUPERVISION for 250+ feet with the least restrictive device within 7 treatment day(s). ________________________________________________________________________________________________ PHYSICAL THERAPY: Daily Note, Treatment Day: 1st, AM 10/17/2018INPATIENT: Hospital Day: 3 [place] / Product Type: Medicare /  
  
NAME/AGE/GENDER: Ana Luisa Ghotra is a 70 y.o. female PRIMARY DIAGNOSIS: Osteoarthritis of right knee, unspecified osteoarthritis type [M17.11] <principal problem not specified> <principal problem not specified> Procedure(s) (LRB): 
RIGHT KNEE ARTHROPLASTY TOTAL   (Right) 2 Days Post-Op ICD-10: Treatment Diagnosis:  
 · Generalized Muscle Weakness (M62.81) · Difficulty in walking, Not elsewhere classified (R26.2) Precaution/Allergies: 
Boniva [ibandronate]; Codeine hcl; and Penicillins WBAT R LE 
  
ASSESSMENT:  
Ms. Rich Olivier was supine in bed upon contact and agreeable to therapy. Patient was min assist for supine to sit and standby- CGA in all transfers. Patient ambulated 250' with RW CGA with verbal cues.  Patient completed stair training with UE support, 3 stairs X2 with verbal/visual cues for proper technique and proper feet placement. Patient performs functional mobility within the room performing ADL activity demonstrating good static standing and fair dynamic standing balance during activity. Patient returned to upright chair to complete HEP LE strengthening exercises and knee flexion/extension measurements with verbal/visual cues. Right knee ROM measured at 65 degrees flexion and -11 degrees extension. Patient demonstrated an understanding of all HEP exercises. Patient returned to recliner at the end of therapy with  in room. Patient voiced no concerns about D/C. Has not met any goals this morning, will continue efforts this afternoon. This section established at most recent assessment PROBLEM LIST (Impairments causing functional limitations): 1. Decreased Strength 2. Decreased ADL/Functional Activities 3. Decreased Transfer Abilities 4. Decreased Ambulation Ability/Technique 5. Decreased Balance 6. Increased Pain 7. Decreased Activity Tolerance 8. Decreased Pacing Skills 9. Increased Fatigue 10. Decreased Flexibility/Joint Mobility 11. Decreased Ulmer with Home Exercise Program 
 INTERVENTIONS PLANNED: (Benefits and precautions of physical therapy have been discussed with the patient.) 1. Balance Exercise 2. Bed Mobility 3. Family Education 4. Gait Training 5. Home Exercise Program (HEP) 6. Neuromuscular Re-education/Strengthening 7. Range of Motion (ROM) 8. Therapeutic Activites 9. Therapeutic Exercise/Strengthening 10. Transfer Training TREATMENT PLAN: Frequency/Duration: twice daily for duration of hospital stayRehabilitation Potential For Stated Goals: Good RECOMMENDED REHABILITATION/EQUIPMENT: (at time of discharge pending progress): Due to the probability of continued deficits (see above) this patient will likely need continued skilled physical therapy after discharge. Equipment:  
? Walkers, Type: Rolling Jared Swift HISTORY:  
History of Present Injury/Illness (Reason for Referral): S/p RIGHT KNEE ARTHROPLASTY TOTAL Past Medical History/Comorbidities: Ms. Hasmukh Overton  has a past medical history of Arthritis, IBS (irritable bowel syndrome), Migraine, and Nausea & vomiting. Ms. Hasmukh Overton  has a past surgical history that includes pr breast surgery procedure unlisted; hx cholecystectomy; hx hysterectomy; hx cataract removal (Bilateral); hx colonoscopy (2018); hx orthopaedic (Left); RIGHT KNEE ARTHROPLASTY TOTAL   (Right, 10/15/2018); and LEFT THUMB EXTENSOR TENDON TRANSFER   (Left, 5/5/2016). Social History/Living Environment:  
Home Environment: Private residence # Steps to Enter: 4 Wheelchair Ramp: (lift chair to enter house) One/Two Story Residence: One story Living Alone: No 
Support Systems: Spouse/Significant Other/Partner, Parent, Child(silvana) Patient Expects to be Discharged to[de-identified] Private residence Current DME Used/Available at Home: Grab bars, Lift chair, Raised toilet seat, Cane, straight, Commode, bedside, Shower chair, Walker, rolling(reacher) Tub or Shower Type: Shower Prior Level of Function/Work/Activity: 
Lives with , indep with all mobility, 0 falls, drives Number of Personal Factors/Comorbidities that affect the Plan of Care: 3+: HIGH COMPLEXITY EXAMINATION:  
Most Recent Physical Functioning:  
Gross Assessment: 
  
    RLE AROM 
R Knee Flexion: 65 R Knee Extension: -11 Posture: 
  
Balance: 
Sitting: Intact Standing: Impaired; With support Standing - Static: Good;Constant support Standing - Dynamic : Fair Bed Mobility: 
Rolling: Stand-by assistance Supine to Sit: Minimum assistance;Contact guard assistance Scooting: Stand-by assistance Interventions: Safety awareness training;Verbal cues; Weight shifting training/Pressure relief Wheelchair Mobility: 
  
Transfers: 
Sit to Stand: Contact guard assistance Stand to Sit: Contact guard assistance Gait: 
  
Base of Support: Narrowed Speed/Barbara: Slow Step Length: Right shortened;Left shortened Gait Abnormalities: Decreased step clearance; Antalgic; Step to gait Distance (ft): 250 Feet (ft) Assistive Device: Walker, rolling;Gait belt Ambulation - Level of Assistance: Contact guard assistance Number of Stairs Trained: 3(3 X2) Stairs - Level of Assistance: Contact guard assistance Rail Use: Both Interventions: Safety awareness training;Verbal cues; Visual/Demos Body Structures Involved: 1. Heart 2. Lungs 3. Bones 4. Joints 5. Muscles 6. Ligaments Body Functions Affected: 1. Sensory/Pain 2. Cardio 3. Respiratory 4. Neuromusculoskeletal 
5. Movement Related Activities and Participation Affected: 1. General Tasks and Demands 2. Mobility 3. Self Care 4. Domestic Life 5. Interpersonal Interactions and Relationships 6. Community, Social and [place] Number of elements that affect the Plan of Care: 4+: HIGH COMPLEXITY CLINICAL PRESENTATION:  
Presentation: Evolving clinical presentation with changing clinical characteristics: MODERATE COMPLEXITY CLINICAL DECISION MAKIN[address] [place], [place] [id number] [facility] [facility]ks Basic Mobility Inpatient Short Form How much difficulty does the patient currently have. .. Unable A Lot A Little None 1. Turning over in bed (including adjusting bedclothes, sheets and blankets)? [] 1   [] 2   [x] 3   [] 4  
2. Sitting down on and standing up from a chair with arms ( e.g., wheelchair, bedside commode, etc.)   [] 1   [] 2   [x] 3   [] 4  
3. Moving from lying on back to sitting on the side of the bed? [] 1   [] 2   [x] 3   [] 4 How much help from another person does the patient currently need. .. Total A Lot A Little None 4. Moving to and from a bed to a chair (including a wheelchair)? [] 1   [] 2   [x] 3   [] 4  
5. Need to walk in hospital room?    [] 1   [] 2   [x] 3   [] 4  
 6.  Climbing 3-5 steps with a railing? [] 1   [x] 2   [] 3   [] 4  
© 2007, Trustees of [address] [place] [address] [address] [address], under license to [email or web address]. All rights reserved Score:  Initial: 17 Most Recent: X (Date: -- ) Interpretation of Tool:  Represents activities that are increasingly more difficult (i.e. Bed mobility, Transfers, Gait). Score 24 23 22-20 19-15 14-10 9-7 6 Modifier CH CI CJ CK CL CM CN   
 
? Mobility - Walking and Moving Around:  
  - CURRENT STATUS: CK - 40%-59% impaired, limited or restricted  - GOAL STATUS: CJ - 20%-39% impaired, limited or restricted  - D/C STATUS:  ---------------To be determined--------------- [place] / [place] B / Product Type: Medicare /   
 
Medical Necessity:    
· Patient is expected to demonstrate progress in strength, range of motion, balance, coordination and functional technique to decrease assistance required with gait, transfers, and functional mobility. Lashawn Tamez Reason for Services/Other Comments: 
· Patient continues to require skilled intervention due to decreased strength, decreased balance, decreased functional tolerance, decreased cardiopulmonary endurance affecting participation in basic ADLs and functional tasks. Use of outcome tool(s) and clinical judgement create a POC that gives a: Clear prediction of patient's progress: LOW COMPLEXITY  
  
 
 
 
TREATMENT:  
(In addition to Assessment/Re-Assessment sessions the following treatments were rendered) Pre-treatment Symptoms/Complaints:  None Pain: Initial:  
Pain Intensity 1: 0  Post Session:  4/10 Therapeutic Exercise: ( 15 minutes):  Exercises per grid below to improve mobility and strength. Required minimal visual, verbal, manual and tactile cues to promote proper body alignment, promote proper body posture and promote proper body mechanics. Progressed range, repetitions and complexity of movement as indicated. Therapeutic Activity: (    25 minutes): Therapeutic activities including: bed mobility, transfer training from various surface heights, static/dynamic balance, ambulation on level ground, stair training, improve mobility, strength, balance and coordination. Required minimal verbal/visual cues for  Safety awareness training;Verbal cues; Visual/Demos to promote static and dynamic balance in sitting. Right knee ROM measured at 65 degrees flexion and -11 degrees extension Date: 
10/16/18 Date: 
10/17/18 Date: 
  
ACTIVITY/EXERCISE AM PM AM PM AM PM  
Ankle pumps 10 X B 15 B 10 B A Quad set 10 X B  15B 10x GlutE sets 10 X  15B 10 x Heel slides 10 X R AA 15 R AA 10 x Hip abdcution 10 X R AA 15 R AA      
SLR 10 X R AAA 15 R AA      
        
B = bilateral; AA = active assistive; A = active; P = passive Braces/Orthotics/Lines/Etc:  
· IV Treatment/Session Assessment:   
· Response to Treatment:  See above · Interdisciplinary Collaboration:  
o Physical Therapy Assistant 
o SPTA · After treatment position/precautions:  
o Up in chair 
o Bed alarm/tab alert on 
o Bed/Chair-wheels locked 
o Bed in low position 
o Call light within reach 
o Family at bedside · Compliance with Program/Exercises: compliant. · Recommendations/Intent for next treatment session: \"Next visit will focus on advancements to more challenging activities and reduction in assistance provided\". Total Treatment Duration: PT Patient Time In/Time Out Time In: 0900 Time Out: 6700 Heide Brown PTA

## 2018-10-17 NOTE — PROGRESS NOTES
Discharge instructions and prescriptions given and explained to pt. Pt verbalized understanding. Medication side effects sheet reviewed with pt. Pt to be discharged home. Needs prescription for tramadol.

## 2018-10-17 NOTE — DISCHARGE INSTRUCTIONS
DO NOT remove the dressing on your knee until [address] visits    Ice until swelling subsides      MAY shower . NO TUB BATHING    ACTIVITY Ambulate with walker                    Weight bearing as tolerated    DO NOT  place pillows under the knee      NO driving until cleared by Dr. Rashid Mehta if ([phone]):  Fever >100.5             Incision becomes red,  swollen or opens up                     Incision has yellow, thick drainage or an odor                    Pain is not managed with prescribed medications                    Excessive nausea and/or vomiting    Report to Dr. Miguelito Mehta redness ,swelling or pain  in your calf, back of knee, thigh or groin    Go to emergency room for sudden chest pain and difficulty breathing        Avoid having pets sleep in bed with you until incision is completely healed      DISCHARGE SUMMARY from Nurse    PATIENT INSTRUCTIONS:    After general anesthesia or intravenous sedation, for 24 hours or while taking prescription Narcotics:  · Limit your activities  · Do not drive and operate hazardous machinery  · Do not make important personal or business decisions  · Do  not drink alcoholic beverages  · If you have not urinated within 8 hours after discharge, please contact your surgeon on call. Report the following to your surgeon:  · Excessive pain, swelling, redness or odor of or around the surgical area  · Temperature over 100.5  · Nausea and vomiting lasting longer than 4 hours or if unable to take medications  · Any signs of decreased circulation or nerve impairment to extremity: change in color, persistent  numbness, tingling, coldness or increase pain  · Any questions    What to do at Home:  Recommended activity: See surgical instructions, diet as tolerated. *  Please give a list of your current medications to your Primary Care Provider.     *  Please update this list whenever your medications are discontinued, doses are      changed, or new medications (including over-the-counter products) are added. *  Please carry medication information at all times in case of emergency situations. These are general instructions for a healthy lifestyle:    No smoking/ No tobacco products/ Avoid exposure to second hand smoke  Surgeon General's Warning:  Quitting smoking now greatly reduces serious risk to your health. Obesity, smoking, and sedentary lifestyle greatly increases your risk for illness    A healthy diet, regular physical exercise & weight monitoring are important for maintaining a healthy lifestyle    You may be retaining fluid if you have a history of heart failure or if you experience any of the following symptoms:  Weight gain of 3 pounds or more overnight or 5 pounds in a week, increased swelling in our hands or feet or shortness of breath while lying flat in bed. Please call your doctor as soon as you notice any of these symptoms; do not wait until your next office visit. Recognize signs and symptoms of STROKE:    F-face looks uneven    A-arms unable to move or move unevenly    S-speech slurred or non-existent    T-time-call 911 as soon as signs and symptoms begin-DO NOT go       Back to bed or wait to see if you get better-TIME IS BRAIN. Warning Signs of HEART ATTACK     Call 911 if you have these symptoms:   Chest discomfort. Most heart attacks involve discomfort in the center of the chest that lasts more than a few minutes, or that goes away and comes back. It can feel like uncomfortable pressure, squeezing, fullness, or pain.  Discomfort in other areas of the upper body. Symptoms can include pain or discomfort in one or both arms, the back, neck, jaw, or stomach.  Shortness of breath with or without chest discomfort.  Other signs may include breaking out in a cold sweat, nausea, or lightheadedness. Don't wait more than five minutes to call 911 - MINUTES MATTER! Fast action can save your life.  Calling 911 is almost always the fastest way to get lifesaving treatment. Emergency Medical Services staff can begin treatment when they arrive -- up to an hour sooner than if someone gets to the hospital by car. The discharge information has been reviewed with the patient. The patient verbalized understanding. Discharge medications reviewed with the patient and appropriate educational materials and side effects teaching were provided.   ___________________________________________________________________________________________________________________________________

## 2018-10-18 ENCOUNTER — PATIENT OUTREACH (OUTPATIENT)
Dept: CASE MANAGEMENT | Age: 78
End: 2018-10-18

## 2018-10-18 NOTE — PROGRESS NOTES
This note will not be viewable in [address] Transition of Care Discharge Follow-up Questionnaire Date/Time of Call: 
 10/18/18 11:11am  
What was the patient hospitalized for? s/p right TKA Does the patient understand his/her diagnosis and/or treatment and what happened during the hospitalization? Yes, spoke with patient, she states her understanding of diagnosis and treatment; and is agreeable to call. Patient reports she is doing very well today Did the patient receive discharge instructions? Yes   
CM Assessed Risk for Readmission:  
 
 
Patient stated Risk for Readmission:  
 
 low r/t diagnosis or complications of surgery 
 
 
none stated Review any discharge instructions (see discharge instructions/AVS in [facility]). Ask patient if they understand these. Do they have any questions? Reviewed, understanding is stated, no questions at this time Were home services ordered (nursing, PT, OT, ST, etc.)? Yes, [facility] If so, has the first visit occurred? If not, why? (Assist with coordination of services if necessary.) Yes, called yesterday Was any DME ordered? No  
  
If so, has it been received? If not, why?  (Assist patient in obtaining DME orders &/or equipment if necessary.) N/A Complete a review of all medications (new, continued and discontinued meds per the D/C instructions and medication tab in [address] [place] [address]). Completed Start:  oxyCODONE-acetaminophen 7.5-325 mg per tablet Were all new prescriptions filled? If not, why?  (Assist patient in obtaining medications if necessary  escalate for CCM &/or SW if ongoing issues are verbalized by pt or anticipated) Yes Does the patient understand the purpose and dosing instructions for all medications? (If patient has questions, provide explanation and education.) Yes, understanding of medications Does the patient have any problems in performing ADLs? (If patient is unable to perform ADLs  what is the limiting factor(s)? Do they have a support system that can assist? If no support system is present, discuss possible assistance that they may be able to obtain. Escalate for CCM/SW if ongoing issues are verbalized by pt or anticipated) Independent with most ADLs,  available to help if needed during recovery Does the patient have all follow-up appointments scheduled? 7 day f/up with PCP?  
(f/up with PCP may be w/in 14 days if patient has a f/up with their specialist w/in 7 days) 7-14 day f/up with specialist?  
(or per discharge instructions) If f/up has not been made  what actions has the care coordinator made to accomplish this? Has transportation been arranged? Yes Gera Sanchez MD- patient will call if need arises during recovery period Mo Ge MD, orthopedic surgery- per patient f/u at end of month, verifies she has it written on the calendar Yes, no transportation issues Any other questions or concerns expressed by the patient? No further questions or concerns at this time. Patient states her gratitude for follow up Contact information for [facility] was given, instructed to call with new questions or concerns. Schedule next appointment with GADIEL BATRES Coordinator or refer to RN Case Manager/ per the workflow guidelines. When is care coordinators next follow-up call scheduled? If referred for CCM  what RN care manager was the referral assigned? Community Care Coordinator will follow per workflow guidelines. 3 to 4 weeks LUIZ Call Completed By: Katharine Thomas LPN Community Care Coordinator

## 2018-11-08 ENCOUNTER — PATIENT OUTREACH (OUTPATIENT)
Dept: CASE MANAGEMENT | Age: 78
End: 2018-11-08

## 2022-03-19 PROBLEM — M17.11 RIGHT KNEE DJD: Status: ACTIVE | Noted: 2018-10-15

## 2022-07-30 ENCOUNTER — HOSPITAL ENCOUNTER (EMERGENCY)
Age: 82
Discharge: HOME | DRG: 389 | End: 2022-07-30
Attending: EMERGENCY MEDICINE | Admitting: EMERGENCY MEDICINE
Payer: MEDICARE

## 2022-07-30 ENCOUNTER — APPOINTMENT (OUTPATIENT)
Dept: CT IMAGING | Age: 82
DRG: 389 | End: 2022-07-30
Payer: MEDICARE

## 2022-07-30 VITALS
DIASTOLIC BLOOD PRESSURE: 58 MMHG | OXYGEN SATURATION: 96 % | HEART RATE: 91 BPM | TEMPERATURE: 98.1 F | RESPIRATION RATE: 14 BRPM | SYSTOLIC BLOOD PRESSURE: 122 MMHG | BODY MASS INDEX: 22.45 KG/M2 | HEIGHT: 62 IN | WEIGHT: 122 LBS

## 2022-07-30 DIAGNOSIS — R10.31 RIGHT LOWER QUADRANT ABDOMINAL PAIN: Primary | ICD-10-CM

## 2022-07-30 DIAGNOSIS — K52.9 ENTERITIS: ICD-10-CM

## 2022-07-30 LAB
ALBUMIN SERPL-MCNC: 3.6 G/DL (ref 3.2–4.6)
ALBUMIN/GLOB SERPL: 0.8 {RATIO} (ref 1.2–3.5)
ALP SERPL-CCNC: 79 U/L (ref 50–136)
ALT SERPL-CCNC: 21 U/L (ref 12–65)
ANION GAP SERPL CALC-SCNC: 3 MMOL/L (ref 7–16)
AST SERPL-CCNC: 26 U/L (ref 15–37)
BASOPHILS # BLD: 0 K/UL (ref 0–0.2)
BASOPHILS NFR BLD: 0 % (ref 0–2)
BILIRUB SERPL-MCNC: 0.7 MG/DL (ref 0.2–1.1)
BILIRUB UR QL: NEGATIVE
BUN SERPL-MCNC: 17 MG/DL (ref 8–23)
CALCIUM SERPL-MCNC: 10.3 MG/DL (ref 8.3–10.4)
CHLORIDE SERPL-SCNC: 100 MMOL/L (ref 98–107)
CO2 SERPL-SCNC: 33 MMOL/L (ref 21–32)
CREAT SERPL-MCNC: 0.9 MG/DL (ref 0.6–1)
DIFFERENTIAL METHOD BLD: ABNORMAL
EOSINOPHIL # BLD: 0 K/UL (ref 0–0.8)
EOSINOPHIL NFR BLD: 0 % (ref 0.5–7.8)
ERYTHROCYTE [DISTWIDTH] IN BLOOD BY AUTOMATED COUNT: 12.8 % (ref 11.9–14.6)
GLOBULIN SER CALC-MCNC: 4.4 G/DL (ref 2.3–3.5)
GLUCOSE SERPL-MCNC: 128 MG/DL (ref 65–100)
GLUCOSE UR QL STRIP.AUTO: NEGATIVE MG/DL
HCT VFR BLD AUTO: 43 % (ref 35.8–46.3)
HGB BLD-MCNC: 14.2 G/DL (ref 11.7–15.4)
IMM GRANULOCYTES # BLD AUTO: 0.1 K/UL (ref 0–0.5)
IMM GRANULOCYTES NFR BLD AUTO: 1 % (ref 0–5)
KETONES UR-MCNC: NEGATIVE MG/DL
LACTATE SERPL-SCNC: 0.8 MMOL/L (ref 0.4–2)
LEUKOCYTE ESTERASE UR QL STRIP: NEGATIVE
LIPASE SERPL-CCNC: 153 U/L (ref 73–393)
LYMPHOCYTES # BLD: 1.6 K/UL (ref 0.5–4.6)
LYMPHOCYTES NFR BLD: 13 % (ref 13–44)
MCH RBC QN AUTO: 30.4 PG (ref 26.1–32.9)
MCHC RBC AUTO-ENTMCNC: 33 G/DL (ref 31.4–35)
MCV RBC AUTO: 92.1 FL (ref 79.6–97.8)
MONOCYTES # BLD: 0.8 K/UL (ref 0.1–1.3)
MONOCYTES NFR BLD: 6 % (ref 4–12)
NEUTS SEG # BLD: 10.3 K/UL (ref 1.7–8.2)
NEUTS SEG NFR BLD: 80 % (ref 43–78)
NITRITE UR QL: NEGATIVE
NRBC # BLD: 0 K/UL (ref 0–0.2)
PH UR: 7.5 [PH] (ref 5–9)
PLATELET # BLD AUTO: 244 K/UL (ref 150–450)
PMV BLD AUTO: 9.3 FL (ref 9.4–12.3)
POTASSIUM SERPL-SCNC: 4 MMOL/L (ref 3.5–5.1)
PROT SERPL-MCNC: 8 G/DL (ref 6.3–8.2)
PROT UR QL: NEGATIVE MG/DL
RBC # BLD AUTO: 4.67 M/UL (ref 4.05–5.2)
RBC # UR STRIP: ABNORMAL /UL
SERVICE CMNT-IMP: ABNORMAL
SODIUM SERPL-SCNC: 136 MMOL/L (ref 136–145)
SP GR UR: 1.02 (ref 1–1.02)
UROBILINOGEN UR QL: 0.2 EU/DL (ref 0.2–1)
WBC # BLD AUTO: 12.7 K/UL (ref 4.3–11.1)

## 2022-07-30 PROCEDURE — 81003 URINALYSIS AUTO W/O SCOPE: CPT

## 2022-07-30 PROCEDURE — 85025 COMPLETE CBC W/AUTO DIFF WBC: CPT

## 2022-07-30 PROCEDURE — 6360000002 HC RX W HCPCS: Performed by: EMERGENCY MEDICINE

## 2022-07-30 PROCEDURE — 99285 EMERGENCY DEPT VISIT HI MDM: CPT

## 2022-07-30 PROCEDURE — 74177 CT ABD & PELVIS W/CONTRAST: CPT

## 2022-07-30 PROCEDURE — 80053 COMPREHEN METABOLIC PANEL: CPT

## 2022-07-30 PROCEDURE — 96374 THER/PROPH/DIAG INJ IV PUSH: CPT

## 2022-07-30 PROCEDURE — 96375 TX/PRO/DX INJ NEW DRUG ADDON: CPT

## 2022-07-30 PROCEDURE — 6360000004 HC RX CONTRAST MEDICATION: Performed by: EMERGENCY MEDICINE

## 2022-07-30 PROCEDURE — 96376 TX/PRO/DX INJ SAME DRUG ADON: CPT

## 2022-07-30 PROCEDURE — 83690 ASSAY OF LIPASE: CPT

## 2022-07-30 PROCEDURE — 2580000003 HC RX 258: Performed by: EMERGENCY MEDICINE

## 2022-07-30 PROCEDURE — 83605 ASSAY OF LACTIC ACID: CPT

## 2022-07-30 RX ORDER — PROMETHAZINE HYDROCHLORIDE 25 MG/1
25 TABLET ORAL EVERY 6 HOURS PRN
Qty: 20 TABLET | Refills: 2 | Status: ON HOLD | OUTPATIENT
Start: 2022-07-30 | End: 2022-08-09 | Stop reason: HOSPADM

## 2022-07-30 RX ORDER — MORPHINE SULFATE 2 MG/ML
2 INJECTION, SOLUTION INTRAMUSCULAR; INTRAVENOUS
Status: COMPLETED | OUTPATIENT
Start: 2022-07-30 | End: 2022-07-30

## 2022-07-30 RX ORDER — ONDANSETRON 2 MG/ML
4 INJECTION INTRAMUSCULAR; INTRAVENOUS
Status: COMPLETED | OUTPATIENT
Start: 2022-07-30 | End: 2022-07-30

## 2022-07-30 RX ORDER — 0.9 % SODIUM CHLORIDE 0.9 %
1000 INTRAVENOUS SOLUTION INTRAVENOUS
Status: COMPLETED | OUTPATIENT
Start: 2022-07-30 | End: 2022-07-30

## 2022-07-30 RX ADMIN — ONDANSETRON 4 MG: 2 INJECTION INTRAMUSCULAR; INTRAVENOUS at 12:38

## 2022-07-30 RX ADMIN — MORPHINE SULFATE 2 MG: 2 INJECTION, SOLUTION INTRAMUSCULAR; INTRAVENOUS at 09:22

## 2022-07-30 RX ADMIN — ONDANSETRON 4 MG: 2 INJECTION INTRAMUSCULAR; INTRAVENOUS at 09:22

## 2022-07-30 RX ADMIN — MORPHINE SULFATE 2 MG: 2 INJECTION, SOLUTION INTRAMUSCULAR; INTRAVENOUS at 12:38

## 2022-07-30 RX ADMIN — DIATRIZOATE MEGLUMINE AND DIATRIZOATE SODIUM 30 ML: 660; 100 LIQUID ORAL; RECTAL at 09:22

## 2022-07-30 RX ADMIN — IOPAMIDOL 100 ML: 755 INJECTION, SOLUTION INTRAVENOUS at 10:15

## 2022-07-30 RX ADMIN — SODIUM CHLORIDE 1000 ML: 9 INJECTION, SOLUTION INTRAVENOUS at 09:22

## 2022-07-30 ASSESSMENT — PAIN DESCRIPTION - LOCATION
LOCATION: ABDOMEN
LOCATION: ABDOMEN

## 2022-07-30 ASSESSMENT — PAIN SCALES - GENERAL
PAINLEVEL_OUTOF10: 10
PAINLEVEL_OUTOF10: 10
PAINLEVEL_OUTOF10: 8

## 2022-07-30 ASSESSMENT — PAIN DESCRIPTION - FREQUENCY: FREQUENCY: CONTINUOUS

## 2022-07-30 ASSESSMENT — ENCOUNTER SYMPTOMS
VOMITING: 1
DIARRHEA: 0
NAUSEA: 1
ABDOMINAL PAIN: 1

## 2022-07-30 ASSESSMENT — PAIN DESCRIPTION - ORIENTATION
ORIENTATION: ANTERIOR
ORIENTATION: ANTERIOR

## 2022-07-30 ASSESSMENT — PAIN - FUNCTIONAL ASSESSMENT: PAIN_FUNCTIONAL_ASSESSMENT: 0-10

## 2022-07-30 ASSESSMENT — PAIN DESCRIPTION - DESCRIPTORS: DESCRIPTORS: ACHING;CRAMPING

## 2022-07-30 NOTE — ED TRIAGE NOTES
Pt to ED via POV ambulatory to triage with c/o lower abdominal pain that began last night. Pt states the pain is sharp pain and states her abdominal feels swollen as well. Pt states nausea and vomiting throughout the evening as well and inability to get comfortable. Pt states last BM was yesterday and states it was normal in nature for her. Pt states taking an antiemetic last evening but does not recall which one and states no relief with the antiemetic.

## 2022-07-30 NOTE — ED NOTES
I have reviewed discharge instructions with the patient. The patient verbalized understanding. Patient left ED via Discharge Method: ambulatory to Home with family. Opportunity for questions and clarification provided. Patient given 2 scripts. To continue your aftercare when you leave the hospital, you may receive an automated call from our care team to check in on how you are doing. This is a free service and part of our promise to provide the best care and service to meet your aftercare needs.  If you have questions, or wish to unsubscribe from this service please call [phone]. Thank you for Choosing our [facility] Emergency Department.       Elo Whitaker RN  07/30/22 [id number]

## 2022-07-30 NOTE — ED NOTES
Report given to Susi Flores, she will resume care at this time.       Jaimie Javed RN  07/30/22 [id number]

## 2022-07-30 NOTE — ED PROVIDER NOTES
[facility] Provider Note                   PCP:                Ghada Hamlin MD               Age: 80 y.o. Sex: female     No diagnosis found. DISPOSITION         New Prescriptions    No medications on file       Orders Placed This Encounter   Procedures    CT ABDOMEN PELVIS W IV CONTRAST Additional Contrast? Oral    CBC with Diff    CMP    Lipase    Lactic Acid (Select if patient is over 65 to rule out mesenteric ischemia)    Diet NPO    POCT Urine Dipstick    POCT Urinalysis no Micro    Saline lock IV         Natalee Noyola is a 80 y.o. female who presents to the Emergency Department with chief complaint of    Chief Complaint   Patient presents with    Abdominal Pain      Patient is a 70-year-old female with a past surgical history of cholecystectomy and hysterectomy, past medical history of glaucoma who presents with abdominal pain. She states it started 2 days ago. Started in her upper abdomen and has since settled in her lower abdomen. She describes as achy sharp pain getting progressively worse. Last night she had nausea and vomiting. She took a Zofran without improvement. She denies similar pain in the past.  Pain gets severe at times. Review of Systems   Constitutional:  Negative for chills and fever. Gastrointestinal:  Positive for abdominal pain, nausea and vomiting. Negative for diarrhea. All other systems reviewed and are negative. All other systems reviewed and are negative. No past medical history on file. No past surgical history on file. No family history on file. Social Connections: Not on file        Allergies   Allergen Reactions    Penicillins Anaphylaxis    Hydrocodone Nausea And Vomiting        Vitals signs and nursing note reviewed.    Patient Vitals for the past 4 hrs:   Temp Pulse Resp BP SpO2   07/30/22 0846 -- -- -- -- 98 %   07/30/22 0845 -- 94 18 (!) 149/79 --   07/30/22 0745 -- 86 -- -- --   07/30/22 0742 -- -- -- 114/89 --   07/30/22 0630 98.1 °F (36.7 °C) 100 18 127/78 100 %          Physical Exam  Vitals and nursing note reviewed. Constitutional:       General: She is not in acute distress. Appearance: Normal appearance. HENT:      Head: Normocephalic and atraumatic. Eyes:      General:         Right eye: No discharge. Left eye: No discharge. Conjunctiva/sclera: Conjunctivae normal.   Cardiovascular:      Rate and Rhythm: Normal rate and regular rhythm. Pulmonary:      Effort: Pulmonary effort is normal. No respiratory distress. Abdominal:      General: There is no distension. Palpations: Abdomen is soft. Tenderness: There is abdominal tenderness. Comments: Moderate tenderness to palpation right lower abdomen as indicated   Musculoskeletal:      Right lower leg: No edema. Left lower leg: No edema. Skin:     General: Skin is warm. Neurological:      Mental Status: She is alert. Psychiatric:         Mood and Affect: Mood normal.         Behavior: Behavior normal.        MDM  Number of Diagnoses or Management Options  Enteritis: new, no workup  Right lower quadrant abdominal pain: new, no workup  Diagnosis management comments: 12:10 PM discussed results with patient, CT scan showing likely enteritis. She appears more comfortable and in no distress. Repeat abdominal exam is benign. She has a primary doctor she can follow-up with.        Amount and/or Complexity of Data Reviewed  Clinical lab tests: ordered and reviewed  Tests in the radiology section of CPT®: ordered and reviewed    Risk of Complications, Morbidity, and/or Mortality  Presenting problems: moderate  Diagnostic procedures: moderate  Management options: moderate    Patient Progress  Patient progress: improved      Procedures    Labs Reviewed   CBC WITH AUTO DIFFERENTIAL - Abnormal; Notable for the following components:       Result Value    WBC 12.7 (*)     MPV 9.3 (*)     Seg Neutrophils 80 (*) Eosinophils % 0 (*)     Segs Absolute 10.3 (*)     All other components within normal limits   COMPREHENSIVE METABOLIC PANEL - Abnormal; Notable for the following components:    CO2 33 (*)     Anion Gap 3 (*)     Glucose 128 (*)     Globulin 4.4 (*)     Albumin/Globulin Ratio 0.8 (*)     All other components within normal limits   POCT URINALYSIS DIPSTICK - Abnormal; Notable for the following components:    Blood, UA POC MODERATE (*)     All other components within normal limits   LIPASE   LACTIC ACID        CT ABDOMEN PELVIS W IV CONTRAST Additional Contrast? Oral    (Results Pending)            Arnel Coma Scale  Eye Opening: Spontaneous  Best Verbal Response: Oriented  Best Motor Response: Obeys commands  Arnel Coma Scale Score: 15                     Voice dictation software was used during the making of this note. This software is not perfect and grammatical and other typographical errors may be present. This note has not been completely proofread for errors.        Ann-Marie Henning MD  07/30/22 2644

## 2022-08-02 ENCOUNTER — APPOINTMENT (OUTPATIENT)
Dept: GENERAL RADIOLOGY | Age: 82
DRG: 389 | End: 2022-08-02
Payer: MEDICARE

## 2022-08-02 ENCOUNTER — HOSPITAL ENCOUNTER (INPATIENT)
Age: 82
LOS: 7 days | Discharge: HOME | DRG: 389 | End: 2022-08-09
Attending: STUDENT IN AN ORGANIZED HEALTH CARE EDUCATION/TRAINING PROGRAM
Payer: MEDICARE

## 2022-08-02 ENCOUNTER — APPOINTMENT (OUTPATIENT)
Dept: CT IMAGING | Age: 82
DRG: 389 | End: 2022-08-02
Payer: MEDICARE

## 2022-08-02 DIAGNOSIS — K56.609 SMALL BOWEL OBSTRUCTION (HCC): Primary | ICD-10-CM

## 2022-08-02 DIAGNOSIS — E87.1 HYPONATREMIA: ICD-10-CM

## 2022-08-02 DIAGNOSIS — N30.00 ACUTE CYSTITIS WITHOUT HEMATURIA: ICD-10-CM

## 2022-08-02 DIAGNOSIS — E87.6 HYPOKALEMIA: ICD-10-CM

## 2022-08-02 PROBLEM — R00.0 SINUS TACHYCARDIA: Status: ACTIVE | Noted: 2022-08-02

## 2022-08-02 PROBLEM — R79.89 PRERENAL AZOTEMIA: Status: ACTIVE | Noted: 2022-08-02

## 2022-08-02 PROBLEM — N32.81 OAB (OVERACTIVE BLADDER): Status: ACTIVE | Noted: 2022-08-02

## 2022-08-02 PROBLEM — R11.2 NAUSEA AND VOMITING: Status: ACTIVE | Noted: 2022-08-02

## 2022-08-02 LAB
ALBUMIN SERPL-MCNC: 3 G/DL (ref 3.2–4.6)
ALBUMIN/GLOB SERPL: 0.9 {RATIO} (ref 1.2–3.5)
ALP SERPL-CCNC: 59 U/L (ref 50–136)
ALT SERPL-CCNC: 24 U/L (ref 12–65)
AMORPH CRY URNS QL MICRO: ABNORMAL
ANION GAP SERPL CALC-SCNC: 9 MMOL/L (ref 7–16)
APPEARANCE UR: ABNORMAL
AST SERPL-CCNC: 21 U/L (ref 15–37)
BACTERIA URNS QL MICRO: ABNORMAL /HPF
BASOPHILS # BLD: 0 K/UL (ref 0–0.2)
BASOPHILS NFR BLD: 0 % (ref 0–2)
BILIRUB SERPL-MCNC: 1.1 MG/DL (ref 0.2–1.1)
BILIRUB UR QL: NEGATIVE
BUN SERPL-MCNC: 29 MG/DL (ref 8–23)
CALCIUM SERPL-MCNC: 8.5 MG/DL (ref 8.3–10.4)
CASTS URNS QL MICRO: ABNORMAL /LPF
CHLORIDE SERPL-SCNC: 86 MMOL/L (ref 98–107)
CO2 SERPL-SCNC: 33 MMOL/L (ref 21–32)
COLOR UR: ABNORMAL
CREAT SERPL-MCNC: 1.1 MG/DL (ref 0.6–1)
DIFFERENTIAL METHOD BLD: ABNORMAL
EKG ATRIAL RATE: 107 BPM
EKG DIAGNOSIS: NORMAL
EKG P AXIS: 45 DEGREES
EKG P-R INTERVAL: 136 MS
EKG Q-T INTERVAL: 360 MS
EKG QRS DURATION: 87 MS
EKG QTC CALCULATION (BAZETT): 481 MS
EKG R AXIS: 67 DEGREES
EKG T AXIS: -10 DEGREES
EKG VENTRICULAR RATE: 107 BPM
EOSINOPHIL # BLD: 0 K/UL (ref 0–0.8)
EOSINOPHIL NFR BLD: 0 % (ref 0.5–7.8)
EPI CELLS #/AREA URNS HPF: ABNORMAL /HPF
ERYTHROCYTE [DISTWIDTH] IN BLOOD BY AUTOMATED COUNT: 12.3 % (ref 11.9–14.6)
GLOBULIN SER CALC-MCNC: 3.4 G/DL (ref 2.3–3.5)
GLUCOSE SERPL-MCNC: 130 MG/DL (ref 65–100)
GLUCOSE UR STRIP.AUTO-MCNC: NEGATIVE MG/DL
HCT VFR BLD AUTO: 39.2 % (ref 35.8–46.3)
HGB BLD-MCNC: 13.2 G/DL (ref 11.7–15.4)
HGB UR QL STRIP: ABNORMAL
IMM GRANULOCYTES # BLD AUTO: 0 K/UL (ref 0–0.5)
IMM GRANULOCYTES NFR BLD AUTO: 0 % (ref 0–5)
KETONES UR QL STRIP.AUTO: 15 MG/DL
LACTATE SERPL-SCNC: 1.8 MMOL/L (ref 0.4–2)
LEUKOCYTE ESTERASE UR QL STRIP.AUTO: ABNORMAL
LIPASE SERPL-CCNC: 97 U/L (ref 73–393)
LYMPHOCYTES # BLD: 0.8 K/UL (ref 0.5–4.6)
LYMPHOCYTES NFR BLD: 8 % (ref 13–44)
MAGNESIUM SERPL-MCNC: 2.3 MG/DL (ref 1.8–2.4)
MAGNESIUM SERPL-MCNC: 2.6 MG/DL (ref 1.8–2.4)
MCH RBC QN AUTO: 30 PG (ref 26.1–32.9)
MCHC RBC AUTO-ENTMCNC: 33.7 G/DL (ref 31.4–35)
MCV RBC AUTO: 89.1 FL (ref 79.6–97.8)
MONOCYTES # BLD: 1.2 K/UL (ref 0.1–1.3)
MONOCYTES NFR BLD: 13 % (ref 4–12)
MUCOUS THREADS URNS QL MICRO: ABNORMAL /LPF
NEUTS SEG # BLD: 7.7 K/UL (ref 1.7–8.2)
NEUTS SEG NFR BLD: 79 % (ref 43–78)
NITRITE UR QL STRIP.AUTO: NEGATIVE
NRBC # BLD: 0 K/UL (ref 0–0.2)
PH UR STRIP: 6 [PH] (ref 5–9)
PLATELET # BLD AUTO: 218 K/UL (ref 150–450)
PMV BLD AUTO: 9.1 FL (ref 9.4–12.3)
POTASSIUM SERPL-SCNC: 2.7 MMOL/L (ref 3.5–5.1)
PROT SERPL-MCNC: 6.4 G/DL (ref 6.3–8.2)
PROT UR STRIP-MCNC: 100 MG/DL
RBC # BLD AUTO: 4.4 M/UL (ref 4.05–5.2)
RBC #/AREA URNS HPF: ABNORMAL /HPF
SODIUM SERPL-SCNC: 128 MMOL/L (ref 136–145)
SP GR UR REFRACTOMETRY: 1.02 (ref 1–1.02)
UROBILINOGEN UR QL STRIP.AUTO: 0.2 EU/DL (ref 0.2–1)
WBC # BLD AUTO: 9.8 K/UL (ref 4.3–11.1)
WBC URNS QL MICRO: ABNORMAL /HPF

## 2022-08-02 PROCEDURE — 2580000003 HC RX 258: Performed by: STUDENT IN AN ORGANIZED HEALTH CARE EDUCATION/TRAINING PROGRAM

## 2022-08-02 PROCEDURE — A4216 STERILE WATER/SALINE, 10 ML: HCPCS | Performed by: STUDENT IN AN ORGANIZED HEALTH CARE EDUCATION/TRAINING PROGRAM

## 2022-08-02 PROCEDURE — 83605 ASSAY OF LACTIC ACID: CPT

## 2022-08-02 PROCEDURE — 1100000000 HC RM PRIVATE

## 2022-08-02 PROCEDURE — 0D9670Z DRAINAGE OF STOMACH WITH DRAINAGE DEVICE, VIA NATURAL OR ARTIFICIAL OPENING: ICD-10-PCS | Performed by: INTERNAL MEDICINE

## 2022-08-02 PROCEDURE — 6360000002 HC RX W HCPCS: Performed by: INTERNAL MEDICINE

## 2022-08-02 PROCEDURE — 83690 ASSAY OF LIPASE: CPT

## 2022-08-02 PROCEDURE — 2580000003 HC RX 258: Performed by: INTERNAL MEDICINE

## 2022-08-02 PROCEDURE — 81001 URINALYSIS AUTO W/SCOPE: CPT

## 2022-08-02 PROCEDURE — 74177 CT ABD & PELVIS W/CONTRAST: CPT

## 2022-08-02 PROCEDURE — 83735 ASSAY OF MAGNESIUM: CPT

## 2022-08-02 PROCEDURE — C9113 INJ PANTOPRAZOLE SODIUM, VIA: HCPCS | Performed by: STUDENT IN AN ORGANIZED HEALTH CARE EDUCATION/TRAINING PROGRAM

## 2022-08-02 PROCEDURE — 6360000002 HC RX W HCPCS: Performed by: STUDENT IN AN ORGANIZED HEALTH CARE EDUCATION/TRAINING PROGRAM

## 2022-08-02 PROCEDURE — 99222 1ST HOSP IP/OBS MODERATE 55: CPT | Performed by: SURGERY

## 2022-08-02 PROCEDURE — 6360000004 HC RX CONTRAST MEDICATION: Performed by: STUDENT IN AN ORGANIZED HEALTH CARE EDUCATION/TRAINING PROGRAM

## 2022-08-02 PROCEDURE — 99285 EMERGENCY DEPT VISIT HI MDM: CPT

## 2022-08-02 PROCEDURE — 85025 COMPLETE CBC W/AUTO DIFF WBC: CPT

## 2022-08-02 PROCEDURE — 74022 RADEX COMPL AQT ABD SERIES: CPT

## 2022-08-02 PROCEDURE — 96361 HYDRATE IV INFUSION ADD-ON: CPT

## 2022-08-02 PROCEDURE — 2500000003 HC RX 250 WO HCPCS: Performed by: INTERNAL MEDICINE

## 2022-08-02 PROCEDURE — 96374 THER/PROPH/DIAG INJ IV PUSH: CPT

## 2022-08-02 PROCEDURE — 80053 COMPREHEN METABOLIC PANEL: CPT

## 2022-08-02 PROCEDURE — 74018 RADEX ABDOMEN 1 VIEW: CPT

## 2022-08-02 PROCEDURE — 93005 ELECTROCARDIOGRAM TRACING: CPT | Performed by: STUDENT IN AN ORGANIZED HEALTH CARE EDUCATION/TRAINING PROGRAM

## 2022-08-02 RX ORDER — SODIUM CHLORIDE 0.9 % (FLUSH) 0.9 %
5-40 SYRINGE (ML) INJECTION PRN
Status: DISCONTINUED | OUTPATIENT
Start: 2022-08-02 | End: 2022-08-09 | Stop reason: HOSPADM

## 2022-08-02 RX ORDER — HYDROMORPHONE HYDROCHLORIDE 1 MG/ML
0.25 INJECTION, SOLUTION INTRAMUSCULAR; INTRAVENOUS; SUBCUTANEOUS
Status: DISCONTINUED | OUTPATIENT
Start: 2022-08-02 | End: 2022-08-09 | Stop reason: HOSPADM

## 2022-08-02 RX ORDER — SUMATRIPTAN 50 MG/1
100 TABLET, FILM COATED ORAL
Status: ON HOLD | COMMUNITY
End: 2022-08-09 | Stop reason: HOSPADM

## 2022-08-02 RX ORDER — ONDANSETRON 2 MG/ML
4 INJECTION INTRAMUSCULAR; INTRAVENOUS
Status: COMPLETED | OUTPATIENT
Start: 2022-08-02 | End: 2022-08-02

## 2022-08-02 RX ORDER — 0.9 % SODIUM CHLORIDE 0.9 %
500 INTRAVENOUS SOLUTION INTRAVENOUS
Status: COMPLETED | OUTPATIENT
Start: 2022-08-02 | End: 2022-08-02

## 2022-08-02 RX ORDER — ENOXAPARIN SODIUM 100 MG/ML
40 INJECTION SUBCUTANEOUS DAILY
Status: DISCONTINUED | OUTPATIENT
Start: 2022-08-02 | End: 2022-08-09 | Stop reason: HOSPADM

## 2022-08-02 RX ORDER — POTASSIUM CHLORIDE AND SODIUM CHLORIDE 900; 300 MG/100ML; MG/100ML
INJECTION, SOLUTION INTRAVENOUS CONTINUOUS
Status: DISCONTINUED | OUTPATIENT
Start: 2022-08-02 | End: 2022-08-05

## 2022-08-02 RX ORDER — POTASSIUM CHLORIDE 7.45 MG/ML
10 INJECTION INTRAVENOUS PRN
Status: DISCONTINUED | OUTPATIENT
Start: 2022-08-02 | End: 2022-08-09 | Stop reason: HOSPADM

## 2022-08-02 RX ORDER — 0.9 % SODIUM CHLORIDE 0.9 %
100 INTRAVENOUS SOLUTION INTRAVENOUS ONCE
Status: DISCONTINUED | OUTPATIENT
Start: 2022-08-02 | End: 2022-08-02

## 2022-08-02 RX ORDER — SODIUM CHLORIDE 9 MG/ML
INJECTION, SOLUTION INTRAVENOUS PRN
Status: DISCONTINUED | OUTPATIENT
Start: 2022-08-02 | End: 2022-08-09 | Stop reason: HOSPADM

## 2022-08-02 RX ORDER — PROMETHAZINE HYDROCHLORIDE 12.5 MG/1
12.5 TABLET ORAL EVERY 6 HOURS PRN
Status: DISCONTINUED | OUTPATIENT
Start: 2022-08-02 | End: 2022-08-09 | Stop reason: HOSPADM

## 2022-08-02 RX ORDER — ONDANSETRON 2 MG/ML
4 INJECTION INTRAMUSCULAR; INTRAVENOUS EVERY 6 HOURS PRN
Status: DISCONTINUED | OUTPATIENT
Start: 2022-08-02 | End: 2022-08-09 | Stop reason: HOSPADM

## 2022-08-02 RX ORDER — POTASSIUM CHLORIDE 20 MEQ/1
40 TABLET, EXTENDED RELEASE ORAL PRN
Status: DISCONTINUED | OUTPATIENT
Start: 2022-08-02 | End: 2022-08-09 | Stop reason: HOSPADM

## 2022-08-02 RX ORDER — LATANOPROST 50 UG/ML
1 SOLUTION/ DROPS OPHTHALMIC NIGHTLY
COMMUNITY

## 2022-08-02 RX ORDER — SODIUM CHLORIDE AND POTASSIUM CHLORIDE .9; .15 G/100ML; G/100ML
SOLUTION INTRAVENOUS CONTINUOUS
Status: DISCONTINUED | OUTPATIENT
Start: 2022-08-02 | End: 2022-08-02

## 2022-08-02 RX ORDER — HYDROMORPHONE HYDROCHLORIDE 1 MG/ML
0.5 INJECTION, SOLUTION INTRAMUSCULAR; INTRAVENOUS; SUBCUTANEOUS
Status: DISCONTINUED | OUTPATIENT
Start: 2022-08-02 | End: 2022-08-09 | Stop reason: HOSPADM

## 2022-08-02 RX ORDER — MAGNESIUM SULFATE IN WATER 40 MG/ML
2000 INJECTION, SOLUTION INTRAVENOUS PRN
Status: DISCONTINUED | OUTPATIENT
Start: 2022-08-02 | End: 2022-08-09 | Stop reason: HOSPADM

## 2022-08-02 RX ORDER — IBUPROFEN 200 MG
400 TABLET ORAL 3 TIMES DAILY PRN
COMMUNITY

## 2022-08-02 RX ORDER — SODIUM CHLORIDE 0.9 % (FLUSH) 0.9 %
5-40 SYRINGE (ML) INJECTION EVERY 12 HOURS SCHEDULED
Status: DISCONTINUED | OUTPATIENT
Start: 2022-08-02 | End: 2022-08-09 | Stop reason: HOSPADM

## 2022-08-02 RX ORDER — CHOLESTYRAMINE LIGHT 4 G/5.7G
4 POWDER, FOR SUSPENSION ORAL DAILY
Status: ON HOLD | COMMUNITY
Start: 2022-07-15 | End: 2022-08-09 | Stop reason: HOSPADM

## 2022-08-02 RX ORDER — SODIUM CHLORIDE 0.9 % (FLUSH) 0.9 %
10 SYRINGE (ML) INJECTION
Status: DISCONTINUED | OUTPATIENT
Start: 2022-08-02 | End: 2022-08-02

## 2022-08-02 RX ADMIN — TUBERCULIN PURIFIED PROTEIN DERIVATIVE 5 UNITS: 5 INJECTION, SOLUTION INTRADERMAL at 22:00

## 2022-08-02 RX ADMIN — IOPAMIDOL 100 ML: 755 INJECTION, SOLUTION INTRAVENOUS at 14:33

## 2022-08-02 RX ADMIN — POTASSIUM CHLORIDE AND SODIUM CHLORIDE: 900; 300 INJECTION, SOLUTION INTRAVENOUS at 20:42

## 2022-08-02 RX ADMIN — ONDANSETRON 4 MG: 2 INJECTION INTRAMUSCULAR; INTRAVENOUS at 11:53

## 2022-08-02 RX ADMIN — DIATRIZOATE MEGLUMINE AND DIATRIZOATE SODIUM 20 ML: 660; 100 LIQUID ORAL; RECTAL at 12:28

## 2022-08-02 RX ADMIN — SODIUM CHLORIDE, PRESERVATIVE FREE 10 ML: 5 INJECTION INTRAVENOUS at 22:16

## 2022-08-02 RX ADMIN — SODIUM CHLORIDE 500 ML: 9 INJECTION, SOLUTION INTRAVENOUS at 11:53

## 2022-08-02 RX ADMIN — SODIUM CHLORIDE 40 MG: 9 INJECTION INTRAMUSCULAR; INTRAVENOUS; SUBCUTANEOUS at 16:12

## 2022-08-02 RX ADMIN — SODIUM CHLORIDE AND POTASSIUM CHLORIDE: 9; 1.49 INJECTION, SOLUTION INTRAVENOUS at 16:01

## 2022-08-02 RX ADMIN — ENOXAPARIN SODIUM 40 MG: 100 INJECTION SUBCUTANEOUS at 20:30

## 2022-08-02 ASSESSMENT — ENCOUNTER SYMPTOMS
EYE DISCHARGE: 0
RHINORRHEA: 0
CHEST TIGHTNESS: 0
SORE THROAT: 0
DIARRHEA: 0
BLOOD IN STOOL: 0
NAUSEA: 1
EYE PAIN: 0
CONSTIPATION: 1
EYE ITCHING: 0
WHEEZING: 0
COLOR CHANGE: 0
ABDOMINAL DISTENTION: 1
ABDOMINAL PAIN: 1
SHORTNESS OF BREATH: 0
COUGH: 0
APNEA: 0
BACK PAIN: 0
VOMITING: 1
FACIAL SWELLING: 0
EYE REDNESS: 0
ANAL BLEEDING: 0

## 2022-08-02 ASSESSMENT — PAIN - FUNCTIONAL ASSESSMENT: PAIN_FUNCTIONAL_ASSESSMENT: NONE - DENIES PAIN

## 2022-08-02 NOTE — ED TRIAGE NOTES
Patient arrived by EMS from Urgent Care for nausea and vomiting. Patient states vomiting since Friday. Patient was here 2 days ago discharged with po Zofran. 4 Zofran 20 mg Pepcid patient received 400 ml of normal saline. Patient took PO and suppository before arrival to Urgent Care.      Bp 133/75 temp 98.4

## 2022-08-02 NOTE — PROGRESS NOTES
TRANSFER - IN REPORT:    Verbal report received from Kb Arguello RN on [employer]  being received from ED for routine progression of patient care      Report consisted of patient's Situation, Background, Assessment and   Recommendations(SBAR). Information from the following report(s) Nurse Handoff Report was reviewed with the receiving nurse. Opportunity for questions and clarification was provided. Assessment completed upon patient's arrival to unit and care assumed.

## 2022-08-02 NOTE — H&P
Hospitalist History and Physical   Admit Date:  2022 11:25 AM   Name:  Meryl Rascon   Age:  80 y.o. Sex:  female  :  1940   MRN:  [id number]   Room:  ER05/    Presenting Complaint: Emesis     Reason(s) for Admission: Small bowel obstruction ([facility] [address] [K56.609]     History of Present Illness:   Meryl Rascon is a 80 y.o. female with overactive bladder who presented to the ER on 22 from Urgent Care with 6 days of worsening abdominal pain, constipation, abdominal distension, and nausea and vomiting. She states she went to lunch with a friend on 22 and noticed she had some LLQ pain and cramping. This continued to get worse and she started having some nausea and vomiting so she came to the ER on 22. She was evaluated and discharged back home feeling better. Then on  she started having worsening abdominal pain and distension and nausea. When this didn't improve she went to Urgent Care on . They saw her sodium was low so they sent her back to the ER. Last BM was 6 days prior and she can't remember the last time she passed gas. Denies CP/SOB. Denies F/C. Denies dysuria. No change in urinary frequency from OAB. Review of Systems:  10 systems reviewed and negative except as noted in HPI.   Assessment & Plan:     Principal Problem:    Small bowel obstruction ([facility] [address]  -  CTAP read with small bowel obstruction with transition point in the right lower quadrant  -  KUB consistent with bowel distension  - Nausea with no flatus/BM in 6 days  - Place NGT to LIS  - Strict I/O  - NPO  - Serial KUBs to assess improvement  - Consult general surgery to follow along    Active Problems:    Nausea and vomiting  - Due to #1  - Place NGT to LIS  - Zofran/Phenergan PRN      Acute hypokalemia  - Likely due to N/V with decreased PO intake  -  K 2.7  - Place on remote telemetry until K >3.0  - Start normal saline with 40meq KCL per 1L  - Check magnesium level  - Check BMP daily      Hyponatremia  - Asymptomatic  - Likely due to hypovolemia with dehydration  - UA with ketones  - Start normal saline with 40meq KCL per 1L  - Check BMP daily      Prerenal azotemia  - BUN up to 29 in 2 days  - UA with ketones  - Start normal saline with 40meq KCL per 1L  - Check BMP daily  - Strict I/O      Sinus tachycardia  - HR >100  - Likely due to SBO + dehydration  - Place NGT to LIS  - Start normal saline with 40meq KCL per 1L      OAB (overactive bladder)  - Hold PO meds for now    Diet: NPO  VTE ppx: Lovenox  CODE STATUS: FULL CODE    Hospital Problems             Last Modified POA    * (Principal) Small bowel obstruction (HCC) 8/2/2022 Yes    Nausea and vomiting 8/2/2022 Yes    Acute hypokalemia 8/2/2022 Yes    Hyponatremia 8/2/2022 Yes    Prerenal azotemia 8/2/2022 Yes    OAB (overactive bladder) 8/2/2022 Yes       Past History:     Past Medical History:   Diagnosis Date    OAB (overactive bladder)        Past Surgical History:   Procedure Laterality Date    CHOLECYSTECTOMY      HYSTERECTOMY, TOTAL ABDOMINAL (CERVIX REMOVED)          Social History     Tobacco Use    Smoking status: Not on file    Smokeless tobacco: Not on file   Substance Use Topics    Alcohol use: Not on file      Social History     Substance and Sexual Activity   Drug Use Not on file       Family History   Problem Relation Age of Onset    High Cholesterol Mother     Hypertension Father         Immunization History   Administered Date(s) Administered    COVID-19, MODERNA BLUE border, Primary or Immunocompromised, (age 12y+), IM, 100 mcg/0.5mL 01/14/2021, 02/11/2021    Influenza Virus Vaccine 10/18/2017    Influenza, High Dose (Fluzone 65 yrs and older) 10/15/2014    Influenza, Trivalent Pf 10/07/2005, 11/02/2007, 11/10/2008, 09/14/2009, 09/30/2010, 10/04/2012, 11/06/2013    Pneumococcal Conjugate 13-valent ([id number]) 08/05/2015    Pneumococcal Polysaccharide ([id number]) 11/02/2007    Td (Adult), 5 Lf Tetanus Toxoid, Pf (Tenivac, Decavac) 08/06/2008    Zoster Live (Zostavax) 03/24/2008     Allergies   Allergen Reactions    Penicillins Anaphylaxis    Codeine Nausea And Vomiting    Hydrocodone Nausea And Vomiting     Prior to Admit Medications:  Current Outpatient Medications   Medication Instructions    HYDROcodone-acetaminophen 7.5-325 MG per 15ML solution 5 mLs, Oral, 4 TIMES DAILY PRN    promethazine (PHENERGAN) 25 mg, Oral, EVERY 6 HOURS PRN         Objective:   Patient Vitals for the past 24 hrs:   Temp Pulse Resp BP SpO2   08/02/22 1346 -- (!) 107 15 (!) 142/78 96 %   08/02/22 1331 -- (!) 102 -- (!) 143/89 97 %   08/02/22 1316 -- (!) 101 -- (!) 151/76 97 %   08/02/22 1301 -- 99 -- (!) 149/99 100 %   08/02/22 1246 -- 99 -- 125/84 94 %   08/02/22 1232 -- (!) 108 -- (!) 108/93 98 %   08/02/22 1130 98.2 °F (36.8 °C) (!) 104 18 (!) 143/84 98 %       Oxygen Therapy  SpO2: 96 %  Pulse Oximeter Device Mode: Continuous  O2 Device: None (Room air)    Estimated body mass index is 22.03 kg/m² as calculated from the following:    Height as of this encounter: 5' 2.4\" (1.585 m). Weight as of this encounter: 122 lb (55.3 kg). Intake/Output Summary (Last 24 hours) at 8/2/2022 1617  Last data filed at 8/2/2022 1354  Gross per 24 hour   Intake 500 ml   Output --   Net 500 ml         Physical Exam:  Blood pressure (!) 142/78, pulse (!) 107, temperature 98.2 °F (36.8 °C), temperature source Oral, resp. rate 15, height 5' 2.4\" (1.585 m), weight 122 lb (55.3 kg), SpO2 96 %. General:    Well nourished. Appears in mild distress from abdominal distension  Head:  Normocephalic, atraumatic  Eyes:  Sclerae appear normal.  Pupils equally round. ENT:  Nares appear normal, no drainage. Moist oral mucosa  Neck:  No restricted ROM. Trachea midline   CV:   Tachy, reg rhythm. No m/r/g. No jugular venous distension. Lungs:   CTAB. No wheezing, rhonchi, or rales. Symmetric expansion. Abdomen: Bowel sounds very slow.   Firm, distended, mildly tender  Extremities: No cyanosis or clubbing. No edema  Skin:     No rashes and normal coloration. Warm and dry. Neuro:  CN II-XII grossly intact. Sensation intact. A&Ox3  Psych:  Normal mood and affect.       I have personally reviewed labs and tests showing:  Recent Labs:  Recent Results (from the past 24 hour(s))   Lactic Acid    Collection Time: 08/02/22 11:43 AM   Result Value Ref Range    Lactic Acid, Plasma 1.8 0.4 - 2.0 MMOL/L   CBC with Auto Differential    Collection Time: 08/02/22 11:45 AM   Result Value Ref Range    WBC 9.8 4.3 - 11.1 K/uL    RBC 4.40 4.05 - 5.2 M/uL    Hemoglobin 13.2 11.7 - 15.4 g/dL    Hematocrit 39.2 35.8 - 46.3 %    MCV 89.1 79.6 - 97.8 FL    MCH 30.0 26.1 - 32.9 PG    MCHC 33.7 31.4 - 35.0 g/dL    RDW 12.3 11.9 - 14.6 %    Platelets 401 048 - 593 K/uL    MPV 9.1 (L) 9.4 - 12.3 FL    nRBC 0.00 0.0 - 0.2 K/uL    Differential Type AUTOMATED      Seg Neutrophils 79 (H) 43 - 78 %    Lymphocytes 8 (L) 13 - 44 %    Monocytes 13 (H) 4.0 - 12.0 %    Eosinophils % 0 (L) 0.5 - 7.8 %    Basophils 0 0.0 - 2.0 %    Immature Granulocytes 0 0.0 - 5.0 %    Segs Absolute 7.7 1.7 - 8.2 K/UL    Absolute Lymph # 0.8 0.5 - 4.6 K/UL    Absolute Mono # 1.2 0.1 - 1.3 K/UL    Absolute Eos # 0.0 0.0 - 0.8 K/UL    Basophils Absolute 0.0 0.0 - 0.2 K/UL    Absolute Immature Granulocyte 0.0 0.0 - 0.5 K/UL   Comprehensive Metabolic Panel    Collection Time: 08/02/22 11:45 AM   Result Value Ref Range    Sodium 128 (L) 136 - 145 mmol/L    Potassium 2.7 (L) 3.5 - 5.1 mmol/L    Chloride 86 (L) 98 - 107 mmol/L    CO2 33 (H) 21 - 32 mmol/L    Anion Gap 9 7 - 16 mmol/L    Glucose 130 (H) 65 - 100 mg/dL    BUN 29 (H) 8 - 23 MG/DL    Creatinine 1.10 (H) 0.6 - 1.0 MG/DL    GFR African American >60 >60 ml/min/1.73m2    GFR Non- 51 (L) >60 ml/min/1.73m2    Calcium 8.5 8.3 - 10.4 MG/DL    Total Bilirubin 1.1 0.2 - 1.1 MG/DL    ALT 24 12 - 65 U/L    AST 21 15 - 37 U/L    Alk Phosphatase 59 50 - 136 U/L Total Protein 6.4 6.3 - 8.2 g/dL    Albumin 3.0 (L) 3.2 - 4.6 g/dL    Globulin 3.4 2.3 - 3.5 g/dL    Albumin/Globulin Ratio 0.9 (L) 1.2 - 3.5     Lipase    Collection Time: 08/02/22 11:45 AM   Result Value Ref Range    Lipase 97 73 - 393 U/L   Magnesium    Collection Time: 08/02/22 11:45 AM   Result Value Ref Range    Magnesium 2.3 1.8 - 2.4 mg/dL   EKG 12 Lead    Collection Time: 08/02/22 12:03 PM   Result Value Ref Range    Ventricular Rate 107 BPM    Atrial Rate 107 BPM    P-R Interval 136 ms    QRS Duration 87 ms    Q-T Interval 360 ms    QTc Calculation (Bazett) 481 ms    P Axis 45 degrees    R Axis 67 degrees    T Axis -10 degrees    Diagnosis Sinus tachycardia    Urinalysis    Collection Time: 08/02/22 12:31 PM   Result Value Ref Range    Color, UA DARK YELLOW      Appearance CLOUDY      Specific Gravity, UA 1.023 1.001 - 1.023      pH, Urine 6.0 5.0 - 9.0      Protein,  (A) NEG mg/dL    Glucose, UA Negative mg/dL    Ketones, Urine 15 (A) NEG mg/dL    Bilirubin Urine Negative NEG      Blood, Urine LARGE (A) NEG      Urobilinogen, Urine 0.2 0.2 - 1.0 EU/dL    Nitrite, Urine Negative NEG      Leukocyte Esterase, Urine SMALL (A) NEG      WBC, UA 0-3 0 /hpf    RBC, UA 20-50 0 /hpf    Epithelial Cells UA 10-20 0 /hpf    BACTERIA, URINE 2+ (H) 0 /hpf    Casts HYALINE 0 /lpf    AMORPHOUS CRYSTAL TRACE 0      Mucus, UA 3+ (H) 0 /lpf       I have personally reviewed imaging studies showing:  XR ACUTE ABD SERIES CHEST 1 VW    Result Date: 8/2/2022  Chest x-ray, 1 view and abdominal radiographs, 2 views INDICATION: Abdominal pain, distention COMPARISON: CT 7/30/2022. FINDINGS: Lungs are clear and the pulmonary vasculature is normal.  No pneumothorax or effusion. The cardiac silhouette is within normal limits. Gaseous distention of small bowel persists, measuring up to 4.2 cm in the left abdomen. No intraperitoneal free air is evident. No significant osseous abnormality.      1.  Persistent gaseous distention of small bowel. 2.  No acute findings in the chest.    CT ABDOMEN PELVIS W IV CONTRAST Additional Contrast? Oral    Result Date: 8/2/2022  CT OF THE ABDOMEN AND PELVIS INDICATION: Nausea and vomiting COMPARISON: 7/30/2022 TECHNIQUE: Multiple axial images were obtained through the abdomen and pelvis after intravenous injection of 100 mL Isovue 370. Intravenous contrast was used for better evaluation of solid organs and vascular structures. Radiation dose reduction techniques were used for this study. Our CT scanners use one or all of the following: Automated exposure control, adjustment of the mA and/or kV according to patient size, iterative reconstruction. FINDINGS: Visualized thorax: Normal. Liver: Normal in size and morphology. No focal lesions. Gallbladder/biliary: Gallbladder surgically absent. No biliary dilatation. Pancreas: Normal. Spleen: Normal. Adrenals: Normal. Kidneys: No focal lesion or hydronephrosis. Bladder: Normal. Pelvic organs: Status post hysterectomy. No adnexal mass. Gastrointestinal: Diffusely dilated small bowel loops with air-fluid levels present. Transition point in the right lower quadrant anteriorly, beyond which small bowel loops are decompressed. Peritoneum/retroperitoneum: Normal. Lymph nodes: Normal. Vessels: Mild atherosclerotic disease. Bones/Soft tissues: No aggressive bone lesion. Small bowel obstruction with transition point in the right lower quadrant. Echocardiogram:  No results found for this or any previous visit.       Meds previously ordered:  Orders Placed This Encounter   Medications    0.9 % sodium chloride bolus    ondansetron (ZOFRAN) injection 4 mg    diatrizoate meglumine-sodium (GASTROGRAFIN) 66-10 % solution 20 mL    0.9 % sodium chloride bolus    sodium chloride flush 0.9 % injection 10 mL    iopamidol (ISOVUE-370) 76 % injection 100 mL    0.9% NaCl with KCl 20 mEq infusion    pantoprazole (PROTONIX) 40 mg in sodium chloride (PF) 10 mL injection Anticipated Discharge/Disposition:     Place: Home  Days: 2-3 days  PPD Ordered On Admission: Yes    Signed:  DO [facility]  8/2/2022

## 2022-08-02 NOTE — CONSULTS
Consult Note  Mary Hauser  MRN: [id number]  :1940  Age:81 y.o.    HPI: Mary Hauser is a 80 y.o. female who we are asked in consultation by Dr. Moses Jacobsen (ED) to see for small bowel obstruction. The patient has a PMHx of overactive bladder, diarrhea (takes cholestyramine daily), and migraines. . She presented 22 to the ED with RLQ pain N, V, and abdominal distention. Her last normal BM was 22. She was discharged from the ED 22 with pain medication and antiemetics. Her symptoms worsened since her initial ED presentation. RLQ ABD pain 8/10 at worst with abdominal distention and not passing flatus. Associated symptoms include N and V. She was admitted on 2022 for SBO, hyponatremia, and hypokalemia. Previous abdominal surgeries: BRIE 1960s; cholecystectomy approximately 20 years ago. Pt is not taking anticoagulation  Pt is NPO at time of consult    Pt denies hematemesis , coffee grinds emesis, melena, hematochezia, CP, SOB, fevers, or chills. Past Medical History:   Diagnosis Date    OAB (overactive bladder)      Past Surgical History:   Procedure Laterality Date    CHOLECYSTECTOMY      HYSTERECTOMY, TOTAL ABDOMINAL (CERVIX REMOVED)       Current Facility-Administered Medications   Medication Dose Route Frequency    0.9 % sodium chloride bolus  100 mL IntraVENous Once    sodium chloride flush 0.9 % injection 10 mL  10 mL IntraVENous ONCE PRN    0.9% NaCl with KCl 20 mEq infusion   IntraVENous Continuous    pantoprazole (PROTONIX) 40 mg in sodium chloride (PF) 10 mL injection  40 mg IntraVENous Daily     Current Outpatient Medications   Medication Sig    cholestyramine light (PREVALITE) 4 GM/DOSE powder Take 4 g by mouth in the morning.     ibuprofen (ADVIL;MOTRIN) 200 MG tablet Take 400 mg by mouth 3 times daily as needed    latanoprost (XALATAN) 0.005 % ophthalmic solution Place 1 drop into both eyes at bedtime    SUMAtriptan (IMITREX) 50 MG tablet Take 100 mg by 151/76 -- -- (!) 101 -- 97 % -- --   08/02/22 1301 (!) 149/99 -- -- 99 -- 100 % -- --   08/02/22 1246 125/84 -- -- 99 -- 94 % -- --   08/02/22 1232 (!) 108/93 -- -- (!) 108 -- 98 % -- --   08/02/22 1130 (!) 143/84 98.2 °F (36.8 °C) Oral (!) 104 18 98 % 5' 2.4\" (1.585 m) 122 lb (55.3 kg)       Labs:  Recent Labs     08/02/22  1145   WBC 9.8   HGB 13.2      *   K 2.7*   CL 86*   CO2 33*   BUN 29*   ALT 24       Lab Results   Component Value Date/Time    WBC 9.8 08/02/2022 11:45 AM    HGB 13.2 08/02/2022 11:45 AM     08/02/2022 11:45 AM     08/02/2022 11:45 AM    K 2.7 08/02/2022 11:45 AM    CL 86 08/02/2022 11:45 AM    CO2 33 08/02/2022 11:45 AM    BUN 29 08/02/2022 11:45 AM    ALT 24 08/02/2022 11:45 AM     CT OF THE ABDOMEN AND PELVIS 8/2/22 15:38       INDICATION: Nausea and vomiting       COMPARISON: 7/30/2022       TECHNIQUE: Multiple axial images were obtained through the abdomen and pelvis   after intravenous injection of 100 mL Isovue 370. Intravenous contrast was used   for better evaluation of solid organs and vascular structures. Radiation dose   reduction techniques were used for this study. Our CT scanners use one or all of   the following: Automated exposure control, adjustment of the mA and/or kV   according to patient size, iterative reconstruction. FINDINGS:   Visualized thorax: Normal.       Liver: Normal in size and morphology. No focal lesions. Gallbladder/biliary: Gallbladder surgically absent. No biliary dilatation. Pancreas: Normal.       Spleen: Normal.       Adrenals: Normal.       Kidneys: No focal lesion or hydronephrosis. Bladder: Normal.       Pelvic organs: Status post hysterectomy. No adnexal mass. Gastrointestinal: Diffusely dilated small bowel loops with air-fluid levels   present. Transition point in the right lower quadrant anteriorly, beyond which   small bowel loops are decompressed.        Peritoneum/retroperitoneum: Normal.       Lymph nodes: Normal.       Vessels: Mild atherosclerotic disease. Bones/Soft tissues: No aggressive bone lesion. Impression   Small bowel obstruction with transition point in the right lower quadrant. I reviewed recent labs and recent radiologic studies. ASSESSMENT/PLAN:  [unfilled]  Principal Problem:    Small bowel obstruction (HCC)  Active Problems:    OAB (overactive bladder)    Acute hypokalemia    Hyponatremia    Prerenal azotemia    Nausea and vomiting    Sinus tachycardia  Resolved Problems:    * No resolved hospital problems.  *     Hospitalist attending  NPO  IVF  NGT LIS    Further input per attending surgeon    Signed:  ENRIQUE Cook NP

## 2022-08-02 NOTE — ED PROVIDER NOTES
[employer] Emergency Department Provider Note                   PCP:                Shonda Chamberlain MD               Age: 80 y.o. Sex: female     No diagnosis found. DISPOSITION          MDM  Number of Diagnoses or Management Options  Hypokalemia: new, needed workup  Hyponatremia: new, needed workup  Small bowel obstruction [facility]): new, needed workup  Diagnosis management comments: X-ray imaging concerning for developing obstruction, orders placed for CT. Laboratory findings reveal ongoing hyponatremia and electrolyte disturbance. Will await CT results. Anticipate admission. Patient does not fact have ongoing hyponatremia, now hypokalemia, awaiting magnesium result, CT imaging so small bowel obstruction consistent with patient's symptoms. We will arrange admission to hospitalist service. Voice dictation software was used during the making of this note. This software is not perfect and grammatical and other typographical errors may be present. This note has been proofread, but may still contain errors.   [address] [facility] [place]; 8/2/2022 @3:43 PM   ===================================================================           Amount and/or Complexity of Data Reviewed  Clinical lab tests: ordered and reviewed  Tests in the radiology section of CPT®: ordered and reviewed  Tests in the medicine section of CPT®: ordered and reviewed  Discuss the patient with other providers: yes  Independent visualization of images, tracings, or specimens: yes    Risk of Complications, Morbidity, and/or Mortality  Presenting problems: moderate  Diagnostic procedures: low  Management options: moderate    Patient Progress  Patient progress: stable       Orders Placed This Encounter   Procedures    XR ACUTE ABD SERIES CHEST 1 VW    CT ABDOMEN PELVIS W IV CONTRAST Additional Contrast? Oral    CBC with Auto Differential    Comprehensive Metabolic Panel    Lipase    Lactic Acid    Magnesium    Urinalysis    EKG 12 Lead Jama Pope is a 80 y.o. female who presents to the Emergency Department with chief complaint of    Chief Complaint   Patient presents with    Emesis      68-year-old female patient presenting to this department from local urgent care with reports of ongoing nausea vomiting and constipation. Patient was seen in this department earlier this week for similar symptoms at which time she underwent CT imaging with normal results. She had labs drawn as well and subsequently discharged with instruction for outpatient follow-up. Patient states her nausea continued, at some point she was prescribed Zofran which she has obtained and has been using without effect. She reports bilious appearing vomiting this morning and subsequently presented to local urgent care. Labs were checked in this clinic and found to be abnormal but she was transferred to this facility for further evaluation. Patient surgical history includes cholecystectomy and total abdominal hysterectomy. She does report some bloated feeling and tightness in her abdomen. She reports no fever or chills. Last bowel movement occurred 4 days ago. She denies passage of flatus. The history is provided by the patient. No  was used. Review of Systems   Constitutional:  Negative for activity change, appetite change, chills, fatigue and fever. HENT:  Negative for congestion, ear discharge, ear pain, rhinorrhea and sore throat. Eyes:  Negative for pain, discharge, redness, itching and visual disturbance. Respiratory:  Negative for apnea, cough, chest tightness, shortness of breath and wheezing. Cardiovascular:  Negative for chest pain, palpitations and leg swelling. Gastrointestinal:  Positive for abdominal distention, abdominal pain, constipation, nausea and vomiting. Negative for anal bleeding and diarrhea.    Genitourinary:  Negative for difficulty urinating, dysuria, flank pain, frequency, hematuria, pelvic pain, vaginal bleeding and vaginal discharge. Musculoskeletal:  Negative for arthralgias, back pain, myalgias, neck pain and neck stiffness. Skin:  Negative for color change, pallor, rash and wound. Neurological:  Negative for dizziness, tremors, facial asymmetry, weakness, light-headedness, numbness and headaches. Psychiatric/Behavioral:  Negative for agitation, behavioral problems, confusion, self-injury and suicidal ideas. The patient is not nervous/anxious. All other systems reviewed and are negative. No past medical history on file. No past surgical history on file. No family history on file. Social History     Socioeconomic History    Marital status:          Penicillins, Codeine, and Hydrocodone     Previous Medications    HYDROCODONE-ACETAMINOPHEN 7.5-325 MG PER 15ML SOLUTION    Take 5 mLs by mouth 4 times daily as needed for Pain for up to 15 doses. PROMETHAZINE (PHENERGAN) 25 MG TABLET    Take 1 tablet by mouth every 6 hours as needed for Nausea        Vitals signs and nursing note reviewed. Patient Vitals for the past 4 hrs:   Pulse Resp BP SpO2   08/02/22 1346 (!) 107 15 (!) 142/78 96 %   08/02/22 1331 (!) 102 -- (!) 143/89 97 %   08/02/22 1316 (!) 101 -- (!) 151/76 97 %   08/02/22 1301 99 -- (!) 149/99 100 %   08/02/22 1246 99 -- 125/84 94 %   08/02/22 1232 (!) 108 -- (!) 108/93 98 %          Physical Exam  Vitals and nursing note reviewed. Constitutional:       General: She is not in acute distress. Appearance: Normal appearance. She is normal weight. She is not ill-appearing or toxic-appearing. Comments: Generally well-appearing, alert and oriented x4. No acute distress, speaks in clear, fluid sentences. HENT:      Head: Normocephalic and atraumatic. Right Ear: External ear normal.      Left Ear: External ear normal.      Nose: Nose normal.      Mouth/Throat:      Mouth: Mucous membranes are moist.   Eyes:      General: No scleral icterus. Right eye: No discharge. Left eye: No discharge. Extraocular Movements: Extraocular movements intact. Cardiovascular:      Rate and Rhythm: Normal rate and regular rhythm. Pulses: Normal pulses. Heart sounds: Normal heart sounds. Pulmonary:      Effort: Pulmonary effort is normal. No respiratory distress. Abdominal:      General: Abdomen is flat. Bowel sounds are increased. There is distension. Palpations: There is no mass. Tenderness: There is generalized abdominal tenderness. There is no right CVA tenderness, left CVA tenderness, guarding or rebound. Negative signs include Carr's sign and McBurney's sign. Hernia: No hernia is present. Musculoskeletal:         General: No swelling, tenderness or deformity. Normal range of motion. Cervical back: Normal range of motion. Skin:     General: Skin is warm. Capillary Refill: Capillary refill takes less than 2 seconds. Neurological:      General: No focal deficit present. Mental Status: She is alert.    Psychiatric:         Mood and Affect: Mood normal.        Procedures      Labs Reviewed   CBC WITH AUTO DIFFERENTIAL - Abnormal; Notable for the following components:       Result Value    MPV 9.1 (*)     Seg Neutrophils 79 (*)     Lymphocytes 8 (*)     Monocytes 13 (*)     Eosinophils % 0 (*)     All other components within normal limits   COMPREHENSIVE METABOLIC PANEL - Abnormal; Notable for the following components:    Sodium 128 (*)     Potassium 2.7 (*)     Chloride 86 (*)     CO2 33 (*)     Glucose 130 (*)     BUN 29 (*)     Creatinine 1.10 (*)     GFR Non- 51 (*)     Albumin 3.0 (*)     Albumin/Globulin Ratio 0.9 (*)     All other components within normal limits   URINALYSIS - Abnormal; Notable for the following components:    Protein,  (*)     Ketones, Urine 15 (*)     Blood, Urine LARGE (*)     Leukocyte Esterase, Urine SMALL (*)     BACTERIA, URINE 2+ (*)     Mucus, UA 3+ (*)

## 2022-08-02 NOTE — ED NOTES
TRANSFER - OUT REPORT:    Verbal report given to receiving nurse on Renny Whalen  being transferred to 7th floor for urgent transfer       Report consisted of patient's Situation, Background, Assessment and   Recommendations(SBAR). Information from the following report(s) Nurse Handoff Report was reviewed with the receiving nurse. Lines:   Peripheral IV 08/02/22 Proximal;Right Forearm (Active)        Opportunity for questions and clarification was provided.       Patient transported with: saige Antonio RN  08/02/22 0550

## 2022-08-03 ENCOUNTER — APPOINTMENT (OUTPATIENT)
Dept: GENERAL RADIOLOGY | Age: 82
DRG: 389 | End: 2022-08-03
Payer: MEDICARE

## 2022-08-03 PROBLEM — R93.5 ABNORMAL CT OF THE ABDOMEN: Status: ACTIVE | Noted: 2022-08-03

## 2022-08-03 LAB
ANION GAP SERPL CALC-SCNC: 6 MMOL/L (ref 7–16)
BUN SERPL-MCNC: 18 MG/DL (ref 8–23)
CALCIUM SERPL-MCNC: 8.1 MG/DL (ref 8.3–10.4)
CHLORIDE SERPL-SCNC: 99 MMOL/L (ref 98–107)
CO2 SERPL-SCNC: 28 MMOL/L (ref 21–32)
CREAT SERPL-MCNC: 0.7 MG/DL (ref 0.6–1)
GLUCOSE SERPL-MCNC: 79 MG/DL (ref 65–100)
MAGNESIUM SERPL-MCNC: 2.4 MG/DL (ref 1.8–2.4)
MM INDURATION, POC: 0 MM (ref 0–5)
PHOSPHATE SERPL-MCNC: 1.8 MG/DL (ref 2.3–3.7)
POTASSIUM SERPL-SCNC: 3.4 MMOL/L (ref 3.5–5.1)
PPD, POC: NEGATIVE
SODIUM SERPL-SCNC: 133 MMOL/L (ref 136–145)

## 2022-08-03 PROCEDURE — 6360000002 HC RX W HCPCS: Performed by: INTERNAL MEDICINE

## 2022-08-03 PROCEDURE — C9113 INJ PANTOPRAZOLE SODIUM, VIA: HCPCS | Performed by: INTERNAL MEDICINE

## 2022-08-03 PROCEDURE — 97535 SELF CARE MNGMENT TRAINING: CPT

## 2022-08-03 PROCEDURE — 2500000003 HC RX 250 WO HCPCS: Performed by: PHYSICIAN ASSISTANT

## 2022-08-03 PROCEDURE — 97165 OT EVAL LOW COMPLEX 30 MIN: CPT

## 2022-08-03 PROCEDURE — 74018 RADEX ABDOMEN 1 VIEW: CPT

## 2022-08-03 PROCEDURE — 1100000000 HC RM PRIVATE

## 2022-08-03 PROCEDURE — 84100 ASSAY OF PHOSPHORUS: CPT

## 2022-08-03 PROCEDURE — 2580000003 HC RX 258: Performed by: INTERNAL MEDICINE

## 2022-08-03 PROCEDURE — 99232 SBSQ HOSP IP/OBS MODERATE 35: CPT | Performed by: SURGERY

## 2022-08-03 PROCEDURE — 2580000003 HC RX 258: Performed by: PHYSICIAN ASSISTANT

## 2022-08-03 PROCEDURE — 36415 COLL VENOUS BLD VENIPUNCTURE: CPT

## 2022-08-03 PROCEDURE — 80048 BASIC METABOLIC PNL TOTAL CA: CPT

## 2022-08-03 PROCEDURE — 97161 PT EVAL LOW COMPLEX 20 MIN: CPT

## 2022-08-03 PROCEDURE — 97530 THERAPEUTIC ACTIVITIES: CPT

## 2022-08-03 PROCEDURE — 6360000002 HC RX W HCPCS: Performed by: PHYSICIAN ASSISTANT

## 2022-08-03 PROCEDURE — A4216 STERILE WATER/SALINE, 10 ML: HCPCS | Performed by: INTERNAL MEDICINE

## 2022-08-03 PROCEDURE — 83735 ASSAY OF MAGNESIUM: CPT

## 2022-08-03 RX ORDER — POTASSIUM CHLORIDE 7.45 MG/ML
10 INJECTION INTRAVENOUS ONCE
Status: COMPLETED | OUTPATIENT
Start: 2022-08-03 | End: 2022-08-03

## 2022-08-03 RX ORDER — BISACODYL 10 MG
10 SUPPOSITORY, RECTAL RECTAL DAILY PRN
Status: DISCONTINUED | OUTPATIENT
Start: 2022-08-03 | End: 2022-08-04

## 2022-08-03 RX ADMIN — SODIUM CHLORIDE, PRESERVATIVE FREE 5 ML: 5 INJECTION INTRAVENOUS at 08:23

## 2022-08-03 RX ADMIN — POTASSIUM CHLORIDE AND SODIUM CHLORIDE: 900; 300 INJECTION, SOLUTION INTRAVENOUS at 05:38

## 2022-08-03 RX ADMIN — POTASSIUM CHLORIDE AND SODIUM CHLORIDE: 900; 300 INJECTION, SOLUTION INTRAVENOUS at 15:01

## 2022-08-03 RX ADMIN — POTASSIUM CHLORIDE 10 MEQ: 7.46 INJECTION, SOLUTION INTRAVENOUS at 09:34

## 2022-08-03 RX ADMIN — SODIUM PHOSPHATE, MONOBASIC, MONOHYDRATE 7.5 MMOL: 276; 142 INJECTION, SOLUTION INTRAVENOUS at 15:02

## 2022-08-03 RX ADMIN — ENOXAPARIN SODIUM 40 MG: 100 INJECTION SUBCUTANEOUS at 08:23

## 2022-08-03 RX ADMIN — SODIUM CHLORIDE 40 MG: 9 INJECTION INTRAMUSCULAR; INTRAVENOUS; SUBCUTANEOUS at 08:23

## 2022-08-03 RX ADMIN — SODIUM CHLORIDE, PRESERVATIVE FREE 10 ML: 5 INJECTION INTRAVENOUS at 21:51

## 2022-08-03 ASSESSMENT — PAIN SCALES - GENERAL
PAINLEVEL_OUTOF10: 0
PAINLEVEL_OUTOF10: 0

## 2022-08-03 NOTE — PROGRESS NOTES
Bedside and Verbal shift change report given to self (oncoming nurse) by Jass Gusman RN (offgoing nurse). Report included the following information Nurse Handoff Report, Index, ED SBAR, Intake/Output, MAR, and Recent Results. Pt was resting calmly in bed at shift change. Provided Ice chips.

## 2022-08-03 NOTE — PROGRESS NOTES
Bedside and Verbal shift change report given to Mathew shairf RN (oncoming nurse) by self Nancy espinoza. Report included the following information Nurse Handoff Report, Index, Intake/Output, MAR, Recent Results, and Cardiac Rhythm SR-ST .  NGT to LIS and patient has her Sodium Phosphate infusion finishing, then can be connected back to her NS/KCL infusing at 125 mL/hr.

## 2022-08-03 NOTE — PROGRESS NOTES
NGT accidentally wiped out of patients nose. Provider notified and will wait on response prior to reinsertion. Addendum [phone] [id number]: See nursing order that says OK to leave NGT out, will replace if patient develops nausea or vomits. Patient made aware.

## 2022-08-03 NOTE — PROGRESS NOTES
OCCUPATIONAL THERAPY Initial Assessment and Daily Note       OT Visit Days: 1  Acknowledge Orders  Time  OT Charge Capture  Rehab Caseload Tracker      Jerrell Merchant is a 80 y.o. female   PRIMARY DIAGNOSIS: Small bowel obstruction ([address]  Hypokalemia [E87.6]  Hyponatremia [E87.1]  Small bowel obstruction ([address] [K56.609]       Reason for Referral: Generalized Muscle Weakness (M62.81)  Dizziness and Giddiness (R42)  Inpatient: Payor: Zeyad Bon / Plan: MEDICARE PART A AND B / Product Type: *No Product type* /     ASSESSMENT:     REHAB RECOMMENDATIONS:   Recommendation to date pending progress:  Setting:  No further skilled therapy after discharge from hospital    Equipment:     Defer to PT for gait DME recommendations      ASSESSMENT:  Ms. Don Snider is a 79 y/o F admitted with N/V due to likely SBO. Baseline independent, living with spouse. Reports no pain. Mobile today with CGA with impaired dynamic standing balance noted. Demonstrates toileting with SBA. She is functioning slightly below her baseline and would benefit from continued OT while in hospital but do not anticipate any post- discharge OT needs. [address] [employer] AM-PAC 6 Clicks Daily Activity Inpatient Short Form:    AM-PAC Daily Activity Inpatient   How much help for putting on and taking off regular lower body clothing?: A Little  How much help for Bathing?: A Little  How much help for Toileting?: A Little  How much help for putting on and taking off regular upper body clothing?: None  How much help for taking care of personal grooming?: None  How much help for eating meals?: None  AM-PAC Inpatient Daily Activity Raw Score: 21  AM-PAC Inpatient ADL T-Scale Score : 44.27  ADL Inpatient CMS 0-100% Score: 32.79  ADL Inpatient CMS G-Code Modifier : CJ           SUBJECTIVE:     Ms. Don Snider states, \"I don't want to pull this tube out again.  \"     Social/Functional Lives With: Spouse  Type of Home: House  Home Layout: One level  Home Access: Stairs to enter with rails  Entrance Stairs - Number of Steps: 4  Has the patient had two or more falls in the past year or any fall with injury in the past year?: No  ADL Assistance: Independent  Ambulation Assistance: Independent  Transfer Assistance: Independent    OBJECTIVE:     Garcia Bunch / Jer Cartagena / Karissa Sees: IV and Nasogastric Tube    RESTRICTIONS/PRECAUTIONS:  Restrictions/Precautions: NPO    PAIN: VITALS / O2:   Pre Treatment:   Pain Assessment: 0-10  Pain Level: 0      Post Treatment: 0       Vitals          Oxygen            GROSS EVALUATION: INTACT IMPAIRED   (See Comments)   UE AROM [x] []   UE PROM [] []NT   Strength [x]       Posture / Balance [] Posture: Fair  Sitting - Static: Good  Sitting - Dynamic: Good  Standing - Static: Fair, +  Standing - Dynamic: Fair   Sensation []  NT   Coordination [x]       Tone []  NT     Edema [] NT   Activity Tolerance [x]       Hand Dominance R [x] L []      COGNITION/  PERCEPTION: INTACT IMPAIRED   (See Comments)   Orientation [x]     Vision []     Hearing []     Cognition  [x]     Perception [x]       MOBILITY: I Mod I S SBA CGA Min Mod Max Total  NT x2 Comments:   Bed Mobility    Rolling [] [] [] [] [] [] [] [] [] [] []    Supine to Sit [] [] [] [x] [] [] [] [] [] [] []    Scooting [] [] [] [x] [] [] [] [] [] [] []    Sit to Supine [] [] [] [x] [] [] [] [] [] [] []    Transfers    Sit to Stand [] [] [] [] [x] [] [] [] [] [] []    Bed to Chair [] [] [] [] [x] [] [] [] [] [] []    Stand to Sit [] [] [] [] [x] [] [] [] [] [] []    Tub/Shower [] [] [] [] [] [] [] [] [] [] []     Toilet [] [] [] [] [x] [] [] [] [] [] []      [] [] [] [] [] [] [] [] [] [] []    I=Independent, Mod I=Modified Independent, S=Supervision/Setup, SBA=Standby Assistance, CGA=Contact Guard Assistance, Min=Minimal Assistance, Mod=Moderate Assistance, Max=Maximal Assistance, Total=Total Assistance, NT=Not Tested    ACTIVITIES OF DAILY LIVING: I Mod I S SBA CGA Min Mod Max Total NT Comments TREATMENT:     EVALUATION: LOW COMPLEXITY: (Untimed Charge)    TREATMENT:   Self Care (8 minutes): Patient participated in toileting and grooming ADLs in standing with no verbal cueing to increase independence and increase activity tolerance. Patient also participated in functional mobility and functional transfer training to increase independence and increase activity tolerance.      TREATMENT GRID:  N/A    AFTER TREATMENT PRECAUTIONS: Bed/Chair Locked, Call light within reach, Needs within reach, and RN notified    INTERDISCIPLINARY COLLABORATION:  RN/ PCT and OT/ SEVILLA    EDUCATION:  Education Given To: Patient  Education Provided: Role of Therapy;Plan of Care  Education Outcome: Verbalized understanding    TOTAL TREATMENT DURATION AND TIME:  Time In: 4499  Time Out: [address]  Minutes: [address], OT

## 2022-08-03 NOTE — PROGRESS NOTES
PHYSICAL THERAPY Initial Assessment, Daily Note, and AM  (Link to Caseload Tracking: PT Visit Days : 1  Acknowledge Orders  Time In/Out  PT Charge Capture  Rehab Caseload Tracker    Slime Herrera is a 80 y.o. female   PRIMARY DIAGNOSIS: Small bowel obstruction [address] [address]  Hypokalemia [E87.6]  Hyponatremia [E87.1]  Small bowel obstruction ([address] [K56.609]       Reason for Referral: Generalized Muscle Weakness (M62.81)  Difficulty in walking, Not elsewhere classified (R26.2)  Inpatient: Payor: Kiko Board / Plan: MEDICARE PART A AND B / Product Type: *No Product type* /     ASSESSMENT:     REHAB RECOMMENDATIONS:   Recommendation to date pending progress:  Setting:  Home Health Therapy    Equipment:    Rolling Walker     ASSESSMENT:  Ms. Marco Antonio Mendoza  is a 80year old F who presents with an SBO. At baseline, pt is independent and active. This date pt performs mobility including bed mobility with SBA. NGT accidentally wiped out by pt while sitting at EOB- RN notified. Pt reported feeling weaker than usual and noted to be reaching out for furniture with ambulation. Balance improved with use of RW, she was able to ambulate 100 ft with SBA-CGA. Toilet transfer with SBA. Pt presents as functioning slightly below her baseline, with deficits in mobility including transfers, gait, balance, and activity tolerance. Pt will benefit from skilled therapy services to address stated deficits to promote return to highest level of function, independence, and safety. Will continue to follow.      [facility] [address] [address] AM-PAC 6 Clicks Basic Mobility Inpatient Short Form  AM-PAC Mobility Inpatient   How much difficulty turning over in bed?: None  How much difficulty sitting down on / standing up from a chair with arms?: None  How much difficulty moving from lying on back to sitting on side of bed?: None  How much help from another person moving to and from a bed to a chair?: A Little  How much help from another person needed to walk in hospital room?: A Little  How much help from another person for climbing 3-5 steps with a railing?: A Little  AM-PAC Inpatient Mobility Raw Score : 21  AM-PAC Inpatient T-Scale Score : 50.25  Mobility Inpatient CMS 0-100% Score: 28.97  Mobility Inpatient CMS G-Code Modifier : CJ    SUBJECTIVE:   Ms. Cash Ramos states, \"I feel a little weak\"     Social/Functional Lives With: Spouse  Type of Home: House  Home Layout: One level  Home Access: Stairs to enter with rails  Entrance Stairs - Number of Steps: 4  Has the patient had two or more falls in the past year or any fall with injury in the past year?: No  ADL Assistance: Independent  Ambulation Assistance: Independent  Transfer Assistance: Independent    OBJECTIVE:     PAIN: Joyce Cousin / O2: Sanjeev Aggarwal / Mariusz Dye / Zak Silvestre:   Pre Treatment:   Pain Assessment: None - Denies Pain      Post Treatment: 0 Vitals        Oxygen      IV and Telemetry     RESTRICTIONS/PRECAUTIONS:                    GROSS EVALUATION: Intact Impaired (Comments):   AROM [x]     PROM [x]    Strength []  Generalized weakness   Balance [] Posture: Fair  Sitting - Static: Good  Sitting - Dynamic: Good  Standing - Static: Fair, +  Standing - Dynamic: Fair   Posture [] Forward Head  Rounded Shoulders   Sensation []     Coordination []      Tone []     Edema []    Activity Tolerance [] Patient limited by fatigue    []      COGNITION/  PERCEPTION: Intact Impaired (Comments):   Orientation [x]     Vision [x]     Hearing [x]     Cognition  [x]       MOBILITY: I Mod I S SBA CGA Min Mod Max Total  NT x2 Comments:   Bed Mobility    Rolling [] [] [] [x] [] [] [] [] [] [] []    Supine to Sit [] [] [] [x] [] [] [] [] [] [] []    Scooting [] [] [] [x] [] [] [] [] [] [] []    Sit to Supine [] [] [] [x] [] [] [] [] [] [] []    Transfers    Sit to Stand [] [] [] [x] [] [] [] [] [] [] []    Bed to Chair [] [] [] [x] [] [] [] [] [] [] []    Stand to Sit [] [] [] [x] [] [] [] [] [] [] []     [] [] [] [] [] [] [] [] [] [] [] I=Independent, Mod I=Modified Independent, S=Supervision, SBA=Standby Assistance, CGA=Contact Guard Assistance,   Min=Minimal Assistance, Mod=Moderate Assistance, Max=Maximal Assistance, Total=Total Assistance, NT=Not Tested    GAIT: I Mod I S SBA CGA Min Mod Max Total  NT x2 Comments:   Level of Assistance [] [] [] [x] [x] [] [] [] [] [] []    Distance 100 feet    DME Rolling Walker    Gait Quality Decreased prabhjot , Decreased step length, Trendelenburg , and Trunk sway increased    Weightbearing Status      Stairs      I=Independent, Mod I=Modified Independent, S=Supervision, SBA=Standby Assistance, CGA=Contact Guard Assistance,   Min=Minimal Assistance, Mod=Moderate Assistance, Max=Maximal Assistance, Total=Total Assistance, NT=Not Tested    PLAN:   ACUTE PHYSICAL THERAPY GOALS:   (Developed with and agreed upon by patient and/or caregiver.)    (1.) Сергей Garcia  will move from supine to sit and sit to supine  with INDEPENDENCE within 7 treatment day(s). (2.) Сергей Garcia will transfer from bed to chair and chair to bed with MODIFIED INDEPENDENCE using the least restrictive device within 7 treatment day(s). (3.) Сергей Garcia will ambulate with MODIFIED INDEPENDENCE for 500 feet with the least restrictive device within 7 treatment day(s). (4.) Сергей Garcia will perform standing static and dynamic balance activities x 10 minutes with SUPERVISION to improve safety within 7 treatment day(s). (5.) Сергей Garcia will ascend and descend 4 stairs using 1 hand rail(s) with SUPERVISION to improve functional mobility and safety within 7 treatment day(s). (6.) Сергей Garcia will perform bilateral lower extremity exercises x 20 min for HEP with INDEPENDENCE to improve strength, endurance, and functional mobility within 7 treatment day(s).        FREQUENCY AND DURATION: 3 times/week for duration of hospital stay or until stated goals are met, whichever comes first.    THERAPY PROGNOSIS: Good    PROBLEM LIST:   (Skilled intervention is medically necessary to address:)  Decreased Activity Tolerance  Decreased Balance  Decreased Gait Ability  Decreased Strength  Decreased Transfer Abilities INTERVENTIONS PLANNED:   (Benefits and precautions of physical therapy have been discussed with the patient.)  Therapeutic Activity  Therapeutic Exercise/HEP  Neuromuscular Re-education  Gait Training  Education       TREATMENT:   EVALUATION: LOW COMPLEXITY: (Untimed Charge)    TREATMENT:   Therapeutic Activity (16 Minutes): Therapeutic activity included Rolling, Supine to Sit, Sit to Supine, Scooting, Transfer Training, Ambulation on level ground, Sitting balance , and Standing balance to improve functional Activity tolerance, Balance, Mobility, and Strength. TREATMENT GRID:  N/A    AFTER TREATMENT PRECAUTIONS: Bed, Bed/Chair Locked, Call light within reach, Needs within reach, RN notified, and Visitors at bedside    INTERDISCIPLINARY COLLABORATION:  RN/ PCT    EDUCATION: Education Given To: Patient; Family  Education Provided: Role of Therapy;Plan of Care;Precautions;Transfer Training;Energy Conservation; Fall Prevention Strategies; Equipment  Education Method: Verbal  Barriers to Learning: None  Education Outcome: Verbalized understanding    TIME IN/OUT:  Time In: 1100  Time Out: 1300 [address]  Minutes: 24    SACHIN QUISPE, PT

## 2022-08-03 NOTE — PROGRESS NOTES
Admit Date: 2022    POD * No surgery found *    Procedure:  * No surgery found *    Subjective:     Patient has complaints of NG fell out overnight. No N/V or flatus/BM. Abd is a little \"sore\". Objective:       Vitals:    22 1957 22 2233 22 0247 22 1109   BP: (!) 132/59 (!) 110/91 (!) 122/50 135/64   Pulse: 98 (!) 107 99 (!) 106   Resp: 18 18 18 17   Temp: 98.4 °F (36.9 °C) 99.1 °F (37.3 °C) 98.8 °F (37.1 °C) 98.6 °F (37 °C)   TempSrc: Oral Oral Oral Oral   SpO2: 98% 97% 96% 98%   Weight:       Height:           Temp (24hrs), Av.7 °F (37.1 °C), Min:98.4 °F (36.9 °C), Max:99.1 °F (37.3 °C)    Intake/Output Summary (Last 24 hours) at 8/3/2022 1148  Last data filed at 8/3/2022 0742  Gross per 24 hour   Intake 500 ml   Output --   Net 500 ml           Physical Exam:   Physical Exam  HENT:      Mouth/Throat:      Mouth: Mucous membranes are moist.   Cardiovascular:      Rate and Rhythm: Normal rate. Heart sounds: Normal heart sounds. Comments: Slightly tachy  Pulmonary:      Effort: Pulmonary effort is normal.      Breath sounds: Normal breath sounds. Abdominal:      Palpations: Abdomen is soft. Comments: Slightly distended. BS scant. Mild Tenderness centrally   Neurological:      Mental Status: She is alert.          Labs:   Recent Results (from the past 24 hour(s))   Lactic Acid    Collection Time: 22 11:43 AM   Result Value Ref Range    Lactic Acid, Plasma 1.8 0.4 - 2.0 MMOL/L   CBC with Auto Differential    Collection Time: 22 11:45 AM   Result Value Ref Range    WBC 9.8 4.3 - 11.1 K/uL    RBC 4.40 4.05 - 5.2 M/uL    Hemoglobin 13.2 11.7 - 15.4 g/dL    Hematocrit 39.2 35.8 - 46.3 %    MCV 89.1 79.6 - 97.8 FL    MCH 30.0 26.1 - 32.9 PG    MCHC 33.7 31.4 - 35.0 g/dL    RDW 12.3 11.9 - 14.6 %    Platelets 820 937 - 696 K/uL    MPV 9.1 (L) 9.4 - 12.3 FL    nRBC 0.00 0.0 - 0.2 K/uL    Differential Type AUTOMATED      Seg Neutrophils 79 (H) 43 - 78 % Lymphocytes 8 (L) 13 - 44 %    Monocytes 13 (H) 4.0 - 12.0 %    Eosinophils % 0 (L) 0.5 - 7.8 %    Basophils 0 0.0 - 2.0 %    Immature Granulocytes 0 0.0 - 5.0 %    Segs Absolute 7.7 1.7 - 8.2 K/UL    Absolute Lymph # 0.8 0.5 - 4.6 K/UL    Absolute Mono # 1.2 0.1 - 1.3 K/UL    Absolute Eos # 0.0 0.0 - 0.8 K/UL    Basophils Absolute 0.0 0.0 - 0.2 K/UL    Absolute Immature Granulocyte 0.0 0.0 - 0.5 K/UL   Comprehensive Metabolic Panel    Collection Time: 08/02/22 11:45 AM   Result Value Ref Range    Sodium 128 (L) 136 - 145 mmol/L    Potassium 2.7 (L) 3.5 - 5.1 mmol/L    Chloride 86 (L) 98 - 107 mmol/L    CO2 33 (H) 21 - 32 mmol/L    Anion Gap 9 7 - 16 mmol/L    Glucose 130 (H) 65 - 100 mg/dL    BUN 29 (H) 8 - 23 MG/DL    Creatinine 1.10 (H) 0.6 - 1.0 MG/DL    GFR African American >60 >60 ml/min/1.73m2    GFR Non- 51 (L) >60 ml/min/1.73m2    Calcium 8.5 8.3 - 10.4 MG/DL    Total Bilirubin 1.1 0.2 - 1.1 MG/DL    ALT 24 12 - 65 U/L    AST 21 15 - 37 U/L    Alk Phosphatase 59 50 - 136 U/L    Total Protein 6.4 6.3 - 8.2 g/dL    Albumin 3.0 (L) 3.2 - 4.6 g/dL    Globulin 3.4 2.3 - 3.5 g/dL    Albumin/Globulin Ratio 0.9 (L) 1.2 - 3.5     Lipase    Collection Time: 08/02/22 11:45 AM   Result Value Ref Range    Lipase 97 73 - 393 U/L   Magnesium    Collection Time: 08/02/22 11:45 AM   Result Value Ref Range    Magnesium 2.3 1.8 - 2.4 mg/dL   Magnesium    Collection Time: 08/02/22 11:45 AM   Result Value Ref Range    Magnesium 2.6 (H) 1.8 - 2.4 mg/dL   EKG 12 Lead    Collection Time: 08/02/22 12:03 PM   Result Value Ref Range    Ventricular Rate 107 BPM    Atrial Rate 107 BPM    P-R Interval 136 ms    QRS Duration 87 ms    Q-T Interval 360 ms    QTc Calculation (Bazett) 481 ms    P Axis 45 degrees    R Axis 67 degrees    T Axis -10 degrees    Diagnosis Sinus tachycardia    Urinalysis    Collection Time: 08/02/22 12:31 PM   Result Value Ref Range    Color, UA DARK YELLOW      Appearance CLOUDY      Specific Gravity, UA 1.023 1.001 - 1.023      pH, Urine 6.0 5.0 - 9.0      Protein,  (A) NEG mg/dL    Glucose, UA Negative mg/dL    Ketones, Urine 15 (A) NEG mg/dL    Bilirubin Urine Negative NEG      Blood, Urine LARGE (A) NEG      Urobilinogen, Urine 0.2 0.2 - 1.0 EU/dL    Nitrite, Urine Negative NEG      Leukocyte Esterase, Urine SMALL (A) NEG      WBC, UA 0-3 0 /hpf    RBC, UA 20-50 0 /hpf    Epithelial Cells UA 10-20 0 /hpf    BACTERIA, URINE 2+ (H) 0 /hpf    Casts HYALINE 0 /lpf    AMORPHOUS CRYSTAL TRACE 0      Mucus, UA 3+ (H) 0 /lpf   Basic Metabolic Panel w/ Reflex to MG    Collection Time: 08/03/22  5:11 AM   Result Value Ref Range    Sodium 133 (L) 136 - 145 mmol/L    Potassium 3.4 (L) 3.5 - 5.1 mmol/L    Chloride 99 98 - 107 mmol/L    CO2 28 21 - 32 mmol/L    Anion Gap 6 (L) 7 - 16 mmol/L    Glucose 79 65 - 100 mg/dL    BUN 18 8 - 23 MG/DL    Creatinine 0.70 0.6 - 1.0 MG/DL    GFR African American >60 >60 ml/min/1.73m2    GFR Non- >60 >60 ml/min/1.73m2    Calcium 8.1 (L) 8.3 - 10.4 MG/DL   Phosphorus    Collection Time: 08/03/22  5:11 AM   Result Value Ref Range    Phosphorus 1.8 (L) 2.3 - 3.7 MG/DL   Magnesium    Collection Time: 08/03/22  5:11 AM   Result Value Ref Range    Magnesium 2.4 1.8 - 2.4 mg/dL       Data Review as above    XR Results (most recent):  @[email or web address]([email or web address])@   US Results (most recent):  @[employer]([email or web address])@   Assessment:     Principal Problem:    Small bowel obstruction (HCC)  Active Problems:    OAB (overactive bladder)    Acute hypokalemia    Hyponatremia    Prerenal azotemia    Nausea and vomiting    Sinus tachycardia  Resolved Problems:    * No resolved hospital problems.  *    SBO     Plan/Recommendations/Medical Decision Making:   Continue non surgical management with bowel rest and IVF  Cr improved hydration  K+ improved with replacement, replace elytes prn  NG presently out- will continue to monitor   Additional rec's per Dr Ho Simpler

## 2022-08-03 NOTE — CONSULTS
Skip Lea MD   [facility]  [address] [facility] [address] [facility] [place] [address]  Phone [phone]   Fax [phone]      Date of visit: 2022      Primary/Requesting provider: Julia Alaniz MD         Name: John Damico      MRN: [id number]       : 1940       Age: 80 y.o. Sex: female        PCP: Julia Alaniz MD     CC:    Chief Complaint   Patient presents with    Emesis       HPI:    John Damico is a 80 y.o. female who presented to the ER with diffuse abdominal pain. She reports pain started 1 week ago. Pain was progressing. Pain ward snot radiate. Pain is worse with pressure nd better with nothing. + nausea/vomiting. Last BM more than 6 days ago. + constipation.   Previous abdominal surgeries - hysterectomy, cholecystectomy      Blood thinners - denies  Immunosuppressants - denies  Smoking - denies      PMH:    Past Medical History:   Diagnosis Date    OAB (overactive bladder)        PSH:    Past Surgical History:   Procedure Laterality Date    CHOLECYSTECTOMY      HYSTERECTOMY, TOTAL ABDOMINAL (CERVIX REMOVED)         MEDS:    Current Facility-Administered Medications   Medication Dose Route Frequency Provider Last Rate Last Admin    pantoprazole (PROTONIX) 40 mg in sodium chloride (PF) 10 mL injection  40 mg IntraVENous Daily Saurav Quinteros, DO   40 mg at 22 1612    sodium chloride flush 0.9 % injection 5-40 mL  5-40 mL IntraVENous 2 times per day Marely Martin, DO        sodium chloride flush 0.9 % injection 5-40 mL  5-40 mL IntraVENous PRN Saurav Quinteros, DO        0.9 % sodium chloride infusion   IntraVENous PRN Saurav Quinteros, DO        potassium chloride (KLOR-CON M) extended release tablet 40 mEq  40 mEq Oral PRN Saurav Quinteros, DO        Or    potassium bicarb-citric acid (EFFER-K) effervescent tablet 40 mEq  40 mEq Oral PRN Marely Martin, DO        Or potassium chloride 10 mEq/100 mL IVPB (Peripheral Line)  10 mEq IntraVENous PRN Vear [place], DO        magnesium sulfate 2000 mg in 50 mL IVPB premix  2,000 mg IntraVENous PRN Vear [place], DO        HYDROmorphone HCl PF (DILAUDID) injection 0.25 mg  0.25 mg IntraVENous Q3H PRN Vear [place], DO        Or    HYDROmorphone HCl PF (DILAUDID) injection 0.5 mg  0.5 mg IntraVENous Q3H PRN Vear [place], DO        promethazine (PHENERGAN) tablet 12.5 mg  12.5 mg Oral Q6H PRN Vear [place], DO        Or    ondansetron (ZOFRAN) injection 4 mg  4 mg IntraVENous Q6H PRN [place] E Ald[place], DO        enoxaparin (LOVENOX) injection 40 mg  40 mg SubCUTAneous Daily [place] E Aldair, DO        tuberculin injection 5 Units  5 Units IntraDERmal Once Vear [place], DO        0.9% NaCl with KCl 40 mEq infusion   IntraVENous Continuous Saurav Quinteros, DO            ALLERGIES:      Allergies   Allergen Reactions    Penicillins Anaphylaxis    Codeine Nausea And Vomiting    Hydrocodone Nausea And Vomiting       SH:         FH:    Family History   Problem Relation Age of Onset    High Cholesterol Mother     Hypertension Father        Review of systems:  Review of Systems   Constitutional:  Negative for chills, fatigue, fever and unexpected weight change. HENT:  Negative for ear discharge, ear pain and facial swelling. Eyes:  Negative for pain and itching. Respiratory:  Negative for cough, chest tightness and shortness of breath. Cardiovascular:  Negative for chest pain. Gastrointestinal:  Positive for abdominal distention, abdominal pain, constipation, nausea and vomiting. Negative for blood in stool and diarrhea. Genitourinary:  Negative for difficulty urinating, dysuria and hematuria. Musculoskeletal:  Negative for back pain, gait problem and neck pain. Skin:  Negative for rash and wound. Neurological:  Negative for facial asymmetry, speech difficulty and weakness. Hematological:  Does not bruise/bleed easily. Psychiatric/Behavioral:  Negative for agitation, decreased concentration and sleep disturbance. Physical Exam:     BP (!) 132/59   Pulse 98   Temp 98.4 °F (36.9 °C) (Oral)   Resp 18   Ht 5' 2.4\" (1.585 m)   Wt 122 lb (55.3 kg)   SpO2 98%   BMI 22.03 kg/m²     General:  Well-developed, well-nourished, no distress. Psych:  Cooperative, good insight and judgement. Neuro:  Alert, oriented to person, place and time. HEENT:  Normocephalic, atraumatic. Sclera clear. Lungs:  Unlabored breathing. Symmetrical chest expansion. Chest wall:  No tenderness or deformity. Heart:  Regular rate and rhythm. No JVD. Abdomen:  Soft, mild diffuse tenderness, tender, distended. No guarding or rebound. Extremities:  Extremities normal, atraumatic, no cyanosis or edema. Skin:  Skin color, texture, turgor normal. No rashes. Labs: All recent labs were reviewed. Normal WBC. Normal Hgb. hypoNa. Hypo K. Elevated Cr.     Recent Results (from the past 24 hour(s))   Lactic Acid    Collection Time: 08/02/22 11:43 AM   Result Value Ref Range    Lactic Acid, Plasma 1.8 0.4 - 2.0 MMOL/L   CBC with Auto Differential    Collection Time: 08/02/22 11:45 AM   Result Value Ref Range    WBC 9.8 4.3 - 11.1 K/uL    RBC 4.40 4.05 - 5.2 M/uL    Hemoglobin 13.2 11.7 - 15.4 g/dL    Hematocrit 39.2 35.8 - 46.3 %    MCV 89.1 79.6 - 97.8 FL    MCH 30.0 26.1 - 32.9 PG    MCHC 33.7 31.4 - 35.0 g/dL    RDW 12.3 11.9 - 14.6 %    Platelets 428 676 - 511 K/uL    MPV 9.1 (L) 9.4 - 12.3 FL    nRBC 0.00 0.0 - 0.2 K/uL    Differential Type AUTOMATED      Seg Neutrophils 79 (H) 43 - 78 %    Lymphocytes 8 (L) 13 - 44 %    Monocytes 13 (H) 4.0 - 12.0 %    Eosinophils % 0 (L) 0.5 - 7.8 %    Basophils 0 0.0 - 2.0 %    Immature Granulocytes 0 0.0 - 5.0 %    Segs Absolute 7.7 1.7 - 8.2 K/UL    Absolute Lymph # 0.8 0.5 - 4.6 K/UL    Absolute Mono # 1.2 0.1 - 1.3 K/UL    Absolute Eos # 0.0 0.0 - 0.8 K/UL Esterase, Urine SMALL (A) NEG      WBC, UA 0-3 0 /hpf    RBC, UA 20-50 0 /hpf    Epithelial Cells UA 10-20 0 /hpf    BACTERIA, URINE 2+ (H) 0 /hpf    Casts HYALINE 0 /lpf    AMORPHOUS CRYSTAL TRACE 0      Mucus, UA 3+ (H) 0 /lpf       Imaging: CT images were independently reviewed by me. + small bowel dilation concerning for SBO. XR ABDOMEN (KUB) (SINGLE AP VIEW)  Narrative: PORTABLE ABDOMEN, August 2, 2022 at 1807 hours:    CLINICAL HISTORY:  Nasogastric tube placement. COMPARISON:  Abdominal series earlier today. FINDINGS:  AP supine image demonstrates a nasogastric tube with the proximal  sidehole at the gastroesophageal junction. Dilated bowel loops are again  evident in the upper abdomen. Impression: Nasogastric tube proximal sidehole is at the gastroesophageal  junction. Advancement by approximately 10 cm is suggested. CT ABDOMEN PELVIS W IV CONTRAST Additional Contrast? Oral  Narrative: CT OF THE ABDOMEN AND PELVIS    INDICATION: Nausea and vomiting    COMPARISON: 7/30/2022    TECHNIQUE: Multiple axial images were obtained through the abdomen and pelvis  after intravenous injection of 100 mL Isovue 370. Intravenous contrast was used  for better evaluation of solid organs and vascular structures. Radiation dose  reduction techniques were used for this study. Our CT scanners use one or all of  the following: Automated exposure control, adjustment of the mA and/or kV  according to patient size, iterative reconstruction. FINDINGS:  Visualized thorax: Normal.    Liver: Normal in size and morphology. No focal lesions. Gallbladder/biliary: Gallbladder surgically absent. No biliary dilatation. Pancreas: Normal.    Spleen: Normal.    Adrenals: Normal.    Kidneys: No focal lesion or hydronephrosis. Bladder: Normal.    Pelvic organs: Status post hysterectomy. No adnexal mass. Gastrointestinal: Diffusely dilated small bowel loops with air-fluid levels  present.  Transition point in the

## 2022-08-03 NOTE — PROGRESS NOTES
Hospitalist Progress Note   Admit Date:  2022 11:25 AM   Name:  Kelle Jiménez   Age:  80 y.o. Sex:  female  :  1940   MRN:  [id number]   Room:  [facility]/    Presenting Complaint: Emesis     Reason(s) for Admission: Hypokalemia [E87.6]  Hyponatremia [E87.1]  Small bowel obstruction [facility] Course & Interval History:   Vinay Le (\"Laverle\") is a 68-year-old  female with history of tachycardia, cholecystectomy, total abdominal hysterectomy, & IBS w/ diarrhea who presented to the ED on  via EMS due to labs showing hyponatremia from Urgent Care w/ chief complaint of N/V described as bilious onset  & accompanying constipation. She had distention & a bloated feeling at presentation. She endorsed last BM  & denies passing flatus since that time, despite attempting a suppository prior to going to Urgent Care. She was seen in ED on  w/ chief complaint of increasing lower abdominal pain onset  w/ aforementioned N/V onset . She described pain as achy & sharp, moving from in her epigastric region to her lower abdomen. She took Zofran  w/o relief. She denied experiencing similar pain before. She had R lower abdominal TTP at that time. She was diagnosed with likely enteritis based on CT scan & d/c'd home w/ prescription for Zofran, as well as directions to f/u w/ PCP. Found in ED on  to have hypokalemia, hyponatremia, & SBO w/ transition point in the RLQ based on CTAP  & XR KUB  supporting bowel distention. NG tube to LWIS placed. Admitted to hospital for SBO. Patient transferred to 7th floor from ED around 6:30 PM.    Note: on review of medical records, [facility] [address] [place] [address] noted in 2017 that patient had positive Colorectal cancer screening using DNA-based stool test, family history of colon cancer (mother), & IBS w/ diarrhea.  Unable to access 2018 colonoscopy pathology at this time.     Subjective/24hr Events (08/03/22): \"I hope I won't need surgery. \"  Pleasant older adult female supine in bed in no apparent acute distress with no complaints. Patient is concerned that NG tube keeps coming out & states it feels like a little peanut is hanging down deep in her chest, but denies pain from the tube. Endorses history of hysterectomy and cholecystectomy, but denies history of bowel obstruction. Reports last colonoscopy was 4 years ago, during which polyps were removed, but denies any notification of concerning findings by PCP at [facility]. Reports that has taken a medication almost every other day in the AM to help with BM, but states that hasn't since onset of this illness. Denies feeling like abdomen is bigger than normal.       Assessment & Plan:     Principal Problem:    Small bowel obstruction    - Continue complete bowel rest with NG tube LIS: strict I/O q 8, NPO + ice chips    - hypoalbuminemia (3), as of yesterday 11:45 AM. Continue to monitor nutritional status & electrolytes. Repeat CMP AM.    -  Repeat CBC in AM to ensure Hgb & Hct stable    - adding dulcolax suppository, per Dr. Leticia Kohli suggestion, appreciate surgical assistance     - per Surgery, SBFT tomorrow, if no improvement    Active Problems:      Acute hypokalemia    - Continue continuous IV 0.9% NaCl with KCl 40 mEq infusion; improving    - Mg2+ elevated 2.6      Hypophosphatemia    - Low serum phosphorus 1.8 mg/dL this AM    - replaced; f/u am phos      Hyponatremia   - Continue continuous IV 0.9% NaCl with KCl 40 mEq infusion   - Na+ trending towards nl range.  Repeat CMP in AM.      Prerenal azotemia    - resolved w/ IVF      Nausea and vomiting    - patient denies N/V at this time    - continue prn Zofran & Phenergan prn      Sinus tachycardia    - Patient's heart rate has remained fast, but stable without arrhythmia or reported symptoms related to the fast rate since admission    - history of tachycardia, per primary dx of 11/07/2018 appt wMary Chávez at [address] [facility] (unable to access full document)    - Continue to monitor       OAB (overactive bladder)    - continue to hold PO meds for now        Discharge Planning:      Anticipate at least 2 midnight hospital stay. Discharge will depend on response to conservative therapy for SBO. Diet:  Diet NPO Exceptions are: Ice Chips  DVT PPx: Lovenox  Code status: Full Code        Objective:   Patient Vitals for the past 24 hrs:   Temp Pulse Resp BP SpO2   08/03/22 0247 98.8 °F (37.1 °C) 99 18 (!) 122/50 96 %   08/02/22 2233 99.1 °F (37.3 °C) (!) 107 18 (!) 110/91 97 %   08/02/22 1957 98.4 °F (36.9 °C) 98 18 (!) 132/59 98 %   08/02/22 1830 -- (!) 115 22 (!) 125/92 --   08/02/22 1815 -- (!) 107 15 (!) 128/53 95 %   08/02/22 1800 -- (!) 110 17 122/74 97 %   08/02/22 1745 -- (!) 109 20 139/78 99 %   08/02/22 1346 -- (!) 107 15 (!) 142/78 96 %   08/02/22 1331 -- (!) 102 -- (!) 143/89 97 %   08/02/22 1316 -- (!) 101 -- (!) 151/76 97 %   08/02/22 1301 -- 99 -- (!) 149/99 100 %   08/02/22 1246 -- 99 -- 125/84 94 %   08/02/22 1232 -- (!) 108 -- (!) 108/93 98 %   08/02/22 1130 98.2 °F (36.8 °C) (!) 104 18 (!) 143/84 98 %       Oxygen Therapy  SpO2: 96 %  Pulse via Oximetry: 101 beats per minute  Pulse Oximeter Device Mode: Continuous  O2 Device: None (Room air)    Estimated body mass index is 22.03 kg/m² as calculated from the following:    Height as of this encounter: 5' 2.4\" (1.585 m). Weight as of this encounter: 122 lb (55.3 kg). Intake/Output Summary (Last 24 hours) at 8/3/2022 1055  Last data filed at 8/3/2022 0742  Gross per 24 hour   Intake 500 ml   Output --   Net 500 ml         Physical Exam:     Blood pressure (!) 122/50, pulse 99, temperature 98.8 °F (37.1 °C), temperature source Oral, resp. rate 18, height 5' 2.4\" (1.585 m), weight 122 lb (55.3 kg), SpO2 96 %. General:    Well nourished.     Head:  Normocephalic, atraumatic  Eyes:  Sclerae appear normal.  Pupils equally round. ENT:  Nares appear normal, no drainage. Moist oral mucosa. NG tube in L nostril taped to external nose. Neck:  No restricted ROM. Trachea midline   CV:   Regular rhythm. Elevated rate on exam.  No m/r/g. No jugular venous distension. Lungs:   CTAB. No wheezing, rhonchi, or rales. Symmetric expansion. Abdomen: Bowel sounds present & mildly hypoactive in RUQ & RLQ. No bowel sounds present in  LUQ or LLQ. Soft, nontender, nondistended in all regions, except for epigastric tenderness to palpation & rebound tenderness. No masses or deformities noted. Extremities: No cyanosis or clubbing. No edema  Skin:     No rashes and normal coloration. Warm and dry. Neuro:  CN II-XII grossly intact. Sensation intact. A&Ox3  Psych:  Normal mood and affect.       I have personally reviewed labs and tests showing:  Recent Labs:  Recent Results (from the past 48 hour(s))   Lactic Acid    Collection Time: 08/02/22 11:43 AM   Result Value Ref Range    Lactic Acid, Plasma 1.8 0.4 - 2.0 MMOL/L   CBC with Auto Differential    Collection Time: 08/02/22 11:45 AM   Result Value Ref Range    WBC 9.8 4.3 - 11.1 K/uL    RBC 4.40 4.05 - 5.2 M/uL    Hemoglobin 13.2 11.7 - 15.4 g/dL    Hematocrit 39.2 35.8 - 46.3 %    MCV 89.1 79.6 - 97.8 FL    MCH 30.0 26.1 - 32.9 PG    MCHC 33.7 31.4 - 35.0 g/dL    RDW 12.3 11.9 - 14.6 %    Platelets 557 031 - 584 K/uL    MPV 9.1 (L) 9.4 - 12.3 FL    nRBC 0.00 0.0 - 0.2 K/uL    Differential Type AUTOMATED      Seg Neutrophils 79 (H) 43 - 78 %    Lymphocytes 8 (L) 13 - 44 %    Monocytes 13 (H) 4.0 - 12.0 %    Eosinophils % 0 (L) 0.5 - 7.8 %    Basophils 0 0.0 - 2.0 %    Immature Granulocytes 0 0.0 - 5.0 %    Segs Absolute 7.7 1.7 - 8.2 K/UL    Absolute Lymph # 0.8 0.5 - 4.6 K/UL    Absolute Mono # 1.2 0.1 - 1.3 K/UL    Absolute Eos # 0.0 0.0 - 0.8 K/UL    Basophils Absolute 0.0 0.0 - 0.2 K/UL    Absolute Immature Granulocyte 0.0 0.0 - 0.5 K/UL   Comprehensive Metabolic Panel Collection Time: 08/02/22 11:45 AM   Result Value Ref Range    Sodium 128 (L) 136 - 145 mmol/L    Potassium 2.7 (L) 3.5 - 5.1 mmol/L    Chloride 86 (L) 98 - 107 mmol/L    CO2 33 (H) 21 - 32 mmol/L    Anion Gap 9 7 - 16 mmol/L    Glucose 130 (H) 65 - 100 mg/dL    BUN 29 (H) 8 - 23 MG/DL    Creatinine 1.10 (H) 0.6 - 1.0 MG/DL    GFR African American >60 >60 ml/min/1.73m2    GFR Non- 51 (L) >60 ml/min/1.73m2    Calcium 8.5 8.3 - 10.4 MG/DL    Total Bilirubin 1.1 0.2 - 1.1 MG/DL    ALT 24 12 - 65 U/L    AST 21 15 - 37 U/L    Alk Phosphatase 59 50 - 136 U/L    Total Protein 6.4 6.3 - 8.2 g/dL    Albumin 3.0 (L) 3.2 - 4.6 g/dL    Globulin 3.4 2.3 - 3.5 g/dL    Albumin/Globulin Ratio 0.9 (L) 1.2 - 3.5     Lipase    Collection Time: 08/02/22 11:45 AM   Result Value Ref Range    Lipase 97 73 - 393 U/L   Magnesium    Collection Time: 08/02/22 11:45 AM   Result Value Ref Range    Magnesium 2.3 1.8 - 2.4 mg/dL   Magnesium    Collection Time: 08/02/22 11:45 AM   Result Value Ref Range    Magnesium 2.6 (H) 1.8 - 2.4 mg/dL   EKG 12 Lead    Collection Time: 08/02/22 12:03 PM   Result Value Ref Range    Ventricular Rate 107 BPM    Atrial Rate 107 BPM    P-R Interval 136 ms    QRS Duration 87 ms    Q-T Interval 360 ms    QTc Calculation (Bazett) 481 ms    P Axis 45 degrees    R Axis 67 degrees    T Axis -10 degrees    Diagnosis Sinus tachycardia    Urinalysis    Collection Time: 08/02/22 12:31 PM   Result Value Ref Range    Color, UA DARK YELLOW      Appearance CLOUDY      Specific Gravity, UA 1.023 1.001 - 1.023      pH, Urine 6.0 5.0 - 9.0      Protein,  (A) NEG mg/dL    Glucose, UA Negative mg/dL    Ketones, Urine 15 (A) NEG mg/dL    Bilirubin Urine Negative NEG      Blood, Urine LARGE (A) NEG      Urobilinogen, Urine 0.2 0.2 - 1.0 EU/dL    Nitrite, Urine Negative NEG      Leukocyte Esterase, Urine SMALL (A) NEG      WBC, UA 0-3 0 /hpf    RBC, UA 20-50 0 /hpf    Epithelial Cells UA 10-20 0 /hpf    BACTERIA, URINE 2+ (H) 0 /hpf    Casts HYALINE 0 /lpf    AMORPHOUS CRYSTAL TRACE 0      Mucus, UA 3+ (H) 0 /lpf   Basic Metabolic Panel w/ Reflex to MG    Collection Time: 08/03/22  5:11 AM   Result Value Ref Range    Sodium 133 (L) 136 - 145 mmol/L    Potassium 3.4 (L) 3.5 - 5.1 mmol/L    Chloride 99 98 - 107 mmol/L    CO2 28 21 - 32 mmol/L    Anion Gap 6 (L) 7 - 16 mmol/L    Glucose 79 65 - 100 mg/dL    BUN 18 8 - 23 MG/DL    Creatinine 0.70 0.6 - 1.0 MG/DL    GFR African American >60 >60 ml/min/1.73m2    GFR Non- >60 >60 ml/min/1.73m2    Calcium 8.1 (L) 8.3 - 10.4 MG/DL   Phosphorus    Collection Time: 08/03/22  5:11 AM   Result Value Ref Range    Phosphorus 1.8 (L) 2.3 - 3.7 MG/DL   Magnesium    Collection Time: 08/03/22  5:11 AM   Result Value Ref Range    Magnesium 2.4 1.8 - 2.4 mg/dL       I have personally reviewed imaging studies showing: Other Studies:  XR ABDOMEN (KUB) (SINGLE AP VIEW)   Final Result      1. Findings as described above. XR ABDOMEN (KUB) (SINGLE AP VIEW)   Final Result   Nasogastric tube proximal sidehole is at the gastroesophageal   junction. Advancement by approximately 10 cm is suggested. CT ABDOMEN PELVIS W IV CONTRAST Additional Contrast? Oral   Final Result   Small bowel obstruction with transition point in the right lower quadrant. XR ACUTE ABD SERIES CHEST 1 VW   Final Result   1. Persistent gaseous distention of small bowel.    2.  No acute findings in the chest.      XR ABDOMEN (KUB) (SINGLE AP VIEW)    (Results Pending)       Current Meds:  Current Facility-Administered Medications   Medication Dose Route Frequency    sodium phosphate 7.5 mmol in sodium chloride 0.9 % 250 mL IVPB  7.5 mmol IntraVENous PRN    Or    sodium phosphate 15 mmol in sodium chloride 0.9 % 250 mL IVPB  15 mmol IntraVENous PRN    pantoprazole (PROTONIX) 40 mg in sodium chloride (PF) 10 mL injection  40 mg IntraVENous Daily    sodium chloride flush 0.9 % injection 5-40 mL  5-40 mL IntraVENous 2 times per day    sodium chloride flush 0.9 % injection 5-40 mL  5-40 mL IntraVENous PRN    0.9 % sodium chloride infusion   IntraVENous PRN    potassium chloride (KLOR-CON M) extended release tablet 40 mEq  40 mEq Oral PRN    Or    potassium bicarb-citric acid (EFFER-K) effervescent tablet 40 mEq  40 mEq Oral PRN    Or    potassium chloride 10 mEq/100 mL IVPB (Peripheral Line)  10 mEq IntraVENous PRN    magnesium sulfate 2000 mg in 50 mL IVPB premix  2,000 mg IntraVENous PRN    HYDROmorphone HCl PF (DILAUDID) injection 0.25 mg  0.25 mg IntraVENous Q3H PRN    Or    HYDROmorphone HCl PF (DILAUDID) injection 0.5 mg  0.5 mg IntraVENous Q3H PRN    promethazine (PHENERGAN) tablet 12.5 mg  12.5 mg Oral Q6H PRN    Or    ondansetron (ZOFRAN) injection 4 mg  4 mg IntraVENous Q6H PRN    enoxaparin (LOVENOX) injection 40 mg  40 mg SubCUTAneous Daily    tuberculin injection 5 Units  5 Units IntraDERmal Once    0.9% NaCl with KCl 40 mEq infusion   IntraVENous Continuous       Signed:  DEZ Pal-S2      Addendum:  Pt seen and examined with PA student, discussed plan of care with student. Admitted for SBO, cont supportive care, electrolyte abnl supplementation. May need SBFT per surgery. Agree with plan of care as outlined above.     REMY JuárezC  [employer]

## 2022-08-03 NOTE — PLAN OF CARE
Problem: Discharge Planning  Goal: Discharge to home or other facility with appropriate resources  Outcome: Progressing  Flowsheets (Taken 8/2/2022 2015)  Discharge to home or other facility with appropriate resources: Identify barriers to discharge with patient and caregiver     Problem: Safety - Adult  Goal: Free from fall injury  Outcome: Progressing     Problem: Pain  Goal: Verbalizes/displays adequate comfort level or baseline comfort level  Outcome: Progressing

## 2022-08-04 ENCOUNTER — APPOINTMENT (OUTPATIENT)
Dept: GENERAL RADIOLOGY | Age: 82
DRG: 389 | End: 2022-08-04
Payer: MEDICARE

## 2022-08-04 LAB
ALBUMIN SERPL-MCNC: 2.4 G/DL (ref 3.2–4.6)
ALBUMIN/GLOB SERPL: 0.8 {RATIO} (ref 1.2–3.5)
ALP SERPL-CCNC: 52 U/L (ref 50–136)
ALT SERPL-CCNC: 18 U/L (ref 12–65)
ANION GAP SERPL CALC-SCNC: 11 MMOL/L (ref 7–16)
AST SERPL-CCNC: 19 U/L (ref 15–37)
BILIRUB DIRECT SERPL-MCNC: 0.2 MG/DL
BILIRUB SERPL-MCNC: 0.6 MG/DL (ref 0.2–1.1)
BUN SERPL-MCNC: 15 MG/DL (ref 8–23)
CALCIUM SERPL-MCNC: 7.7 MG/DL (ref 8.3–10.4)
CHLORIDE SERPL-SCNC: 107 MMOL/L (ref 98–107)
CO2 SERPL-SCNC: 20 MMOL/L (ref 21–32)
CREAT SERPL-MCNC: 0.5 MG/DL (ref 0.6–1)
ERYTHROCYTE [DISTWIDTH] IN BLOOD BY AUTOMATED COUNT: 12.6 % (ref 11.9–14.6)
GLOBULIN SER CALC-MCNC: 3.1 G/DL (ref 2.3–3.5)
GLUCOSE SERPL-MCNC: 44 MG/DL (ref 65–100)
HCT VFR BLD AUTO: 37.8 % (ref 35.8–46.3)
HGB BLD-MCNC: 12.3 G/DL (ref 11.7–15.4)
MCH RBC QN AUTO: 30.4 PG (ref 26.1–32.9)
MCHC RBC AUTO-ENTMCNC: 32.5 G/DL (ref 31.4–35)
MCV RBC AUTO: 93.6 FL (ref 79.6–97.8)
MM INDURATION, POC: 0 MM (ref 0–5)
NRBC # BLD: 0 K/UL (ref 0–0.2)
PHOSPHATE SERPL-MCNC: 1.6 MG/DL (ref 2.3–3.7)
PLATELET # BLD AUTO: 209 K/UL (ref 150–450)
PMV BLD AUTO: 9.3 FL (ref 9.4–12.3)
POTASSIUM SERPL-SCNC: 3.8 MMOL/L (ref 3.5–5.1)
PPD, POC: NEGATIVE
PROT SERPL-MCNC: 5.5 G/DL (ref 6.3–8.2)
RBC # BLD AUTO: 4.04 M/UL (ref 4.05–5.2)
SODIUM SERPL-SCNC: 138 MMOL/L (ref 136–145)
WBC # BLD AUTO: 8 K/UL (ref 4.3–11.1)

## 2022-08-04 PROCEDURE — 2500000003 HC RX 250 WO HCPCS: Performed by: PHYSICIAN ASSISTANT

## 2022-08-04 PROCEDURE — A4216 STERILE WATER/SALINE, 10 ML: HCPCS | Performed by: INTERNAL MEDICINE

## 2022-08-04 PROCEDURE — 6370000000 HC RX 637 (ALT 250 FOR IP): Performed by: SURGERY

## 2022-08-04 PROCEDURE — 6360000002 HC RX W HCPCS: Performed by: INTERNAL MEDICINE

## 2022-08-04 PROCEDURE — 80048 BASIC METABOLIC PNL TOTAL CA: CPT

## 2022-08-04 PROCEDURE — 2580000003 HC RX 258: Performed by: INTERNAL MEDICINE

## 2022-08-04 PROCEDURE — 80076 HEPATIC FUNCTION PANEL: CPT

## 2022-08-04 PROCEDURE — C9113 INJ PANTOPRAZOLE SODIUM, VIA: HCPCS | Performed by: INTERNAL MEDICINE

## 2022-08-04 PROCEDURE — 74018 RADEX ABDOMEN 1 VIEW: CPT

## 2022-08-04 PROCEDURE — 85027 COMPLETE CBC AUTOMATED: CPT

## 2022-08-04 PROCEDURE — 97535 SELF CARE MNGMENT TRAINING: CPT

## 2022-08-04 PROCEDURE — 84100 ASSAY OF PHOSPHORUS: CPT

## 2022-08-04 PROCEDURE — 97530 THERAPEUTIC ACTIVITIES: CPT

## 2022-08-04 PROCEDURE — 1100000000 HC RM PRIVATE

## 2022-08-04 PROCEDURE — 99232 SBSQ HOSP IP/OBS MODERATE 35: CPT | Performed by: SURGERY

## 2022-08-04 PROCEDURE — 36415 COLL VENOUS BLD VENIPUNCTURE: CPT

## 2022-08-04 PROCEDURE — 6370000000 HC RX 637 (ALT 250 FOR IP): Performed by: NURSE PRACTITIONER

## 2022-08-04 PROCEDURE — 2580000003 HC RX 258: Performed by: PHYSICIAN ASSISTANT

## 2022-08-04 RX ORDER — SODIUM PHOSPHATE, DIBASIC AND SODIUM PHOSPHATE, MONOBASIC 7; 19 G/133ML; G/133ML
1 ENEMA RECTAL ONCE
Status: COMPLETED | OUTPATIENT
Start: 2022-08-04 | End: 2022-08-04

## 2022-08-04 RX ORDER — BISACODYL 10 MG
10 SUPPOSITORY, RECTAL RECTAL DAILY
Status: DISCONTINUED | OUTPATIENT
Start: 2022-08-04 | End: 2022-08-09 | Stop reason: HOSPADM

## 2022-08-04 RX ORDER — BISACODYL 10 MG
10 SUPPOSITORY, RECTAL RECTAL ONCE
Status: COMPLETED | OUTPATIENT
Start: 2022-08-04 | End: 2022-08-04

## 2022-08-04 RX ADMIN — ENOXAPARIN SODIUM 40 MG: 100 INJECTION SUBCUTANEOUS at 09:42

## 2022-08-04 RX ADMIN — SODIUM CHLORIDE, PRESERVATIVE FREE 5 ML: 5 INJECTION INTRAVENOUS at 20:43

## 2022-08-04 RX ADMIN — SODIUM CHLORIDE 40 MG: 9 INJECTION INTRAMUSCULAR; INTRAVENOUS; SUBCUTANEOUS at 09:42

## 2022-08-04 RX ADMIN — POTASSIUM CHLORIDE AND SODIUM CHLORIDE: 900; 300 INJECTION, SOLUTION INTRAVENOUS at 19:49

## 2022-08-04 RX ADMIN — SODIUM PHOSPHATE 1 ENEMA: 7; 19 ENEMA RECTAL at 11:28

## 2022-08-04 RX ADMIN — POTASSIUM CHLORIDE AND SODIUM CHLORIDE: 900; 300 INJECTION, SOLUTION INTRAVENOUS at 05:40

## 2022-08-04 RX ADMIN — SODIUM CHLORIDE, PRESERVATIVE FREE 5 ML: 5 INJECTION INTRAVENOUS at 09:59

## 2022-08-04 RX ADMIN — SODIUM PHOSPHATE, MONOBASIC, MONOHYDRATE 7.5 MMOL: 276; 142 INJECTION, SOLUTION INTRAVENOUS at 11:28

## 2022-08-04 RX ADMIN — BISACODYL 10 MG: 10 SUPPOSITORY RECTAL at 09:42

## 2022-08-04 RX ADMIN — BISACODYL 10 MG: 10 SUPPOSITORY RECTAL at 20:44

## 2022-08-04 ASSESSMENT — PAIN SCALES - GENERAL: PAINLEVEL_OUTOF10: 0

## 2022-08-04 NOTE — CARE COORDINATION
CM screened chart for potential discharge needs. No CM consult received. Patient admitted for bowel obstruction. Per review of general surgeries documentation, they will treat non-operatively. [facility] Patient insured with traditional Medicare. CM will continue to follow for ongoing discharge needs.          08/04/22 9349   Service Assessment   History Provided By Medical Record

## 2022-08-04 NOTE — PROGRESS NOTES
OCCUPATIONAL THERAPY: Daily Note AM   OT Visit Days: 2   Time  OT Charge Capture  Rehab Caseload Tracker  OT Orders    Sandra Leal is a 80 y.o. female   PRIMARY DIAGNOSIS: Small bowel obstruction (HCC)  Hypokalemia [E87.6]  Hyponatremia [E87.1]  Small bowel obstruction ([address] [K56.609]       Inpatient: Payor: MEDICARE / Plan: MEDICARE PART A AND B / Product Type: *No Product type* /     ASSESSMENT:     REHAB RECOMMENDATIONS: CURRENT LEVEL OF FUNCTION:  (Most Recently Demonstrated)   Recommendation to date pending progress:  Setting:  No further skilled therapy after discharge from hospital    Equipment:    To Be Determined Bathing:  Supervision/Setup  Dressing:  Supervision/Setup  Feeding/Grooming:  Supervision/Setup  Toileting:  Supervision/Setup  Functional Mobility:  Stand by Assist     ASSESSMENT:  Ms. Mindi Andrew was supine in bed upon arrival. Pt completed bed mobility with supervision. Pt completed functional mobility and functional transfer with SBA. Pt competed toileting with supervision. Pt completed sponge bath, grooming and dressing while sitting at sink. Continue POC.         SUBJECTIVE:     Ms. Mindi Andrew states, \"Hey\"     Social/Functional Lives With: Spouse  Type of Home: House  Home Layout: One level  Home Access: Stairs to enter with rails  Entrance Stairs - Number of Steps: 4  Has the patient had two or more falls in the past year or any fall with injury in the past year?: No  ADL Assistance: Independent  Ambulation Assistance: Independent  Transfer Assistance: Independent    OBJECTIVE:     Mehreen Merrill / Bassam Dominguez / Vidhya Zhong: IV and Nasogastric Tube    RESTRICTIONS/PRECAUTIONS:  Restrictions/Precautions  Restrictions/Precautions: NPO        PAIN: VITALS / O2:   Pre Treatment: 0             Post Treatment: 0 Vitals          Oxygen            MOBILITY: I Mod I S SBA CGA Min Mod Max Total  NT x2 Comments:   Bed Mobility    Rolling [] [] [] [] [] [] [] [] [] [] []    Supine to Sit [] [] [x] [] [] [] [] [] [] [] []    Scooting [] [] [] [] [] [] [] [] [] [] []    Sit to Supine [] [] [] [] [] [] [] [] [] [] []    Transfers    Sit to Stand [] [] [] [x] [] [] [] [] [] [] []    Bed to Chair [] [] [] [x] [] [] [] [] [] [] []    Stand to Sit [] [] [] [] [] [] [] [] [] [] []    Tub/Shower [] [] [] [] [] [] [] [] [] [] []     Toilet [] [] [] [x] [] [] [] [] [] [] []      [] [] [] [] [] [] [] [] [] [] []    I=Independent, Mod I=Modified Independent, S=Supervision/Setup, SBA=Standby Assistance, CGA=Contact Guard Assistance, Min=Minimal Assistance, Mod=Moderate Assistance, Max=Maximal Assistance, Total=Total Assistance, NT=Not Tested    ACTIVITIES OF DAILY LIVING: I Mod I S SBA CGA Min Mod Max Total NT Comments   BASIC ADLs:              Upper Body   Bathing [] [] [x] [] [] [] [] [] [] []    Lower Body Bathing [] [] [x] [] [] [] [] [] [] []    Toileting [] [] [x] [] [] [] [] [] [] []    Upper Body Dressing [] [] [x] [] [] [] [] [] [] []    Lower Body Dressing [] [] [x] [] [] [] [] [] [] []    Feeding [] [] [] [] [] [] [] [] [] []    Grooming [] [] [x] [] [] [] [] [] [] []    Personal Device Care [] [] [] [] [] [] [] [] [] []    Functional Mobility [] [] [] [] [] [] [] [] [] []    I=Independent, Mod I=Modified Independent, S=Supervision/Setup, SBA=Standby Assistance, CGA=Contact Guard Assistance, Min=Minimal Assistance, Mod=Moderate Assistance, Max=Maximal Assistance, Total=Total Assistance, NT=Not Tested    BALANCE: Good Fair+ Fair Fair- Poor NT Comments   Sitting Static [x] [] [] [] [] []    Sitting Dynamic [] [] [] [] [] []              Standing Static [x] [] [] [] [] []    Standing Dynamic [] [] [] [] [] []        PLAN:     FREQUENCY/DURATION   OT Plan of Care: 3 times/week for duration of hospital stay or until stated goals are met, whichever comes first.    ACUTE OCCUPATIONAL THERAPY GOALS:   (Developed with and agreed upon by patient and/or caregiver.)  1.  Patient will complete lower body bathing and dressing with

## 2022-08-04 NOTE — PLAN OF CARE
Problem: Discharge Planning  Goal: Discharge to home or other facility with appropriate resources  Outcome: Progressing  Flowsheets (Taken 8/3/2022 2020)  Discharge to home or other facility with appropriate resources: Identify barriers to discharge with patient and caregiver     Problem: Safety - Adult  Goal: Free from fall injury  Outcome: Progressing  Flowsheets (Taken 8/3/2022 2020)  Free From Fall Injury: Instruct family/caregiver on patient safety     Problem: Pain  Goal: Verbalizes/displays adequate comfort level or baseline comfort level  Outcome: Progressing

## 2022-08-04 NOTE — PROGRESS NOTES
PHYSICAL THERAPY: Daily Note AM   (Link to Caseload Tracking: PT Visit Days : 2  Time In/Out PT Charge Capture  Rehab Caseload Tracker  Orders    Kirk Wolfe is a 80 y.o. female   PRIMARY DIAGNOSIS: Small bowel obstruction ([facility] [address]  Hypokalemia [E87.6]  Hyponatremia [E87.1]  Small bowel obstruction ([facility] [address] [K56.609]       Inpatient: Payor: MEDICARE / Plan: MEDICARE PART A AND B / Product Type: *No Product type* /     ASSESSMENT:     REHAB RECOMMENDATIONS:   Recommendation to date pending progress:  Setting:  Home Health Therapy    Equipment:    To Be Determined     ASSESSMENT:  Ms. Aby Dooley was in the chair and willing to walk. She walked about 200 feet total with the walker and SBA. She has extremely collapsed arches walking on cement floors without shoes so between that and not feeling well she benefited from the walker. Unlikely she would need one for home. Moving well.      SUBJECTIVE:   Ms. Aby Dooley states, \"I can walk\"     Social/Functional Lives With: Spouse  Type of Home: House  Home Layout: One level  Home Access: Stairs to enter with rails  Entrance Stairs - Number of Steps: 4  Has the patient had two or more falls in the past year or any fall with injury in the past year?: No  ADL Assistance: Independent  Ambulation Assistance: Independent  Transfer Assistance: Independent  OBJECTIVE:     PAIN: Gaylyn Nam / O2: Abena Sonia / Nohemi Huggins / Jackelin Riling:   Pre Treatment:          Post Treatment: 0 Vitals        Oxygen    IV and Nasogastric Tube    RESTRICTIONS/PRECAUTIONS:        MOBILITY: I Mod I S SBA CGA Min Mod Max Total  NT x2 Comments:   Bed Mobility    Rolling [] [] [] [] [] [] [] [] [] [] []    Supine to Sit [] [] [] [] [] [] [] [] [] [] []    Scooting [] [] [] [] [] [] [] [] [] [] []    Sit to Supine [] [] [] [] [] [] [] [] [] [] []    Transfers    Sit to Stand [] [] [x] [] [] [] [] [] [] [] []    Bed to Chair [] [] [] [] [] [] [] [] [] [] []    Stand to Sit [x] [] [] [] [] [] [] [] [] [] []     [] [] [] [] [] [] [] [] [] [] []    I=Independent, Mod I=Modified Independent, S=Supervision, SBA=Standby Assistance, CGA=Contact Guard Assistance,   Min=Minimal Assistance, Mod=Moderate Assistance, Max=Maximal Assistance, Total=Total Assistance, NT=Not Tested    BALANCE: Good Fair+ Fair Fair- Poor NT Comments   Sitting Static [x] [] [] [] [] []    Sitting Dynamic [x] [] [] [] [] []              Standing Static [] [x] [] [] [] []    Standing Dynamic [] [x] [] [] [] []      GAIT: I Mod I S SBA CGA Min Mod Max Total  NT x2 Comments:   Level of Assistance [] [] [] [x] [] [] [] [] [] [] []    Distance 200 feet    DME Rolling Walker    Gait Quality Decreased step clearance and Decreased step length    Weightbearing Status      Stairs      I=Independent, Mod I=Modified Independent, S=Supervision, SBA=Standby Assistance, CGA=Contact Guard Assistance,   Min=Minimal Assistance, Mod=Moderate Assistance, Max=Maximal Assistance, Total=Total Assistance, NT=Not Tested    PLAN:   ACUTE PHYSICAL THERAPY GOALS:   (Developed with and agreed upon by patient and/or caregiver.)  (1.) Carla Payor  will move from supine to sit and sit to supine  with INDEPENDENCE within 7 treatment day(s). (2.) [place] Payor will transfer from bed to chair and chair to bed with MODIFIED INDEPENDENCE using the least restrictive device within 7 treatment day(s). (3.) Carla Payor will ambulate with MODIFIED INDEPENDENCE for 500 feet with the least restrictive device within 7 treatment day(s). (4.) Carla Payor will perform standing static and dynamic balance activities x 10 minutes with SUPERVISION to improve safety within 7 treatment day(s). (5.) Carla Payor will ascend and descend 4 stairs using 1 hand rail(s) with SUPERVISION to improve functional mobility and safety within 7 treatment day(s).   (6.) Jewel Mehta Keepers will perform bilateral lower extremity exercises x 20 min for HEP with INDEPENDENCE to improve strength, endurance, and functional mobility within 7 treatment day(s). FREQUENCY AND DURATION: 3 times/week for duration of hospital stay or until stated goals are met, whichever comes first.    TREATMENT:   TREATMENT:   Therapeutic Activity (15 Minutes): Therapeutic activity included Ambulation on level ground to improve functional Activity tolerance, Mobility, and Strength.     TREATMENT GRID:  N/A    AFTER TREATMENT PRECAUTIONS: Bed/Chair Locked, Call light within reach, Chair, and Visitors at bedside    INTERDISCIPLINARY COLLABORATION:  RN/ PCT and PT/ PTA    EDUCATION:      TIME IN/OUT:  Time In: 1035  Time Out: 1050  Minutes: 15    Juan Dee PTA

## 2022-08-04 NOTE — PROGRESS NOTES
Hospitalist Progress Note   Admit Date:  2022 11:25 AM   Name:  Sandra Leal   Age:  80 y.o. Sex:  female  :  1940   MRN:  [id number]   Room:  [facility]/    Presenting Complaint: Emesis     Reason(s) for Admission: Hypokalemia [E87.6]  Hyponatremia [E87.1]  Small bowel obstruction [facility] Course & Interval History:   Vinay Andrew (\"Laverle\") is a 73-year-old  female with history of tachycardia, cholecystectomy, total abdominal hysterectomy, & IBS w/ diarrhea who presented to the ED on  via EMS due to labs showing hyponatremia from Urgent Care w/ chief complaint of N/V described as bilious onset  & accompanying constipation. She had distention & a bloated feeling at presentation. She endorsed last BM  & denies passing flatus since that time, despite attempting a suppository prior to going to Urgent Care. She was seen in ED on  w/ chief complaint of increasing lower abdominal pain onset  w/ aforementioned N/V onset . She described pain as achy & sharp, moving from in her epigastric region to her lower abdomen. She took Zofran  w/o relief. She denied experiencing similar pain before. She had R lower abdominal TTP at that time. She was diagnosed with likely enteritis based on CT scan & d/c'd home w/ prescription for Zofran, as well as directions to f/u w/ PCP. Found in ED on  to have hypokalemia, hyponatremia, & SBO w/ transition point in the RLQ based on CTAP  & XR KUB  supporting bowel distention. NG tube to LWIS placed. Admitted to hospital for SBO. Patient transferred to 7th floor from ED around 6:30 PM.     Reports Ih/o BS w/ diarrhea for \"many years\": States that has taken cholestyramine light most mornings for these years for diarrhea, but then not taken it days when feels constipated.  States that usually just one day of constipation and no cholestyramine light and then diarrhea is back again so starts taking. Reports last colonoscopy was 4 years ago, during which polyps were removed, but denies any notification of concerning [facility]. Colonoscopy also 05/18/10    Reports family history: mother had colorectal cancer found on routine colonoscopy & treated successfully with colon resection & 1 medication w/o return. Father had \"really bad\" diabetes & heart attack in his 66's. Subjective/24hr Events (08/04/22):   Pleasant older adult female with NG tube in right nostril supine in bed in no apparent acute distress with complaint of \"feeling so dry\". Excitedly states, \"I passed gas twice overnight\". Agrees that abdomen is slightly larger than yesterday. Denies N/V, hiccupping, & abdominal pain. Assessment & Plan:     Principal Problem:        Small bowel obstruction    - Continue complete bowel rest with NG tube LIS: strict I/O q 8, NPO + ice chips    - + flatulence x2 overnight    - LFTs this AM reveal low total protein (5.5 g/dL) & hypoalbuminemia (2.4 g/dL): Repeat LFT AM to continue monitoring nutritional status    - RBC count just below nl limits (4.04 M/UL); Hgb & Hct stable wnl, but trending downwards: Repeat CBC in AM to ensure Hgb & Hct still stable. - Surgery continues to follow patient & is appreciated: Dr. Fantasma Mcdonald has ordered scheduled bisacodyl suppository & SBFT is scheduled for today     Active Problems:       Hypocalcemia    - Serum calcium low at 7.7 mg/dL today; has continued to trend downwards last 2 days; f/u BMP w/ reflex Mg2+    - Likely due to hypoalbuminemia. Will continue to monitor.  Repeat BMP AM.      Hypophosphatemia    - Replaced yesterday, but still low serum phosphorus 1.6 mg/dL this AM; has continued to trend downwards; f/u AM phos    - Phosphorus replacement ordered for today: sodium phosphate 10 mmol in dextrose 5% 250 mL IVPB      Nausea and vomiting    - patient denies N/V at this time    - continue prn Zofran & Phenergan prn      Rule out UTI    - admission UA with large blood, dark, small leuks, 2+ bactereia, nitrite negative    - check ucx for completeness; as pt remains afebrile and normal WBC count, will NOT start empiric abx unless acute infectious process declares itself       Chronic mild sinus tachycardia    - History of tachycardia (primary dx 11/07/18 appt w/ [facility])     - consider starting oral BB once SBO resolves; closely monitor       OAB (overactive bladder)    - continue to hold PO meds for now      Acute hypokalemia    - Resolved    - Will continue to monitor pt's electrolyte status: BMPw/reflexMg2+ AM        Hyponatremia    - resolved    - Will continue to monitor pt's electrolyte status: BMPw/reflexMg2+ AM        Prerenal azotemia    - resolved w/ IVF    - UA to f/u monitor kidney function          Discharge Planning:    - pending clinical course    Diet:  Diet NPO Exceptions are: Ice Chips  DVT PPx: Lovenox  Code status: Full Code    Hospital Problems:  Principal Problem:    Small bowel obstruction (HCC)  Active Problems:    OAB (overactive bladder)    Acute hypokalemia    Hyponatremia    Prerenal azotemia    Nausea and vomiting    Sinus tachycardia    Abnormal CT of the abdomen  Resolved Problems:    * No resolved hospital problems.  *      Objective:   Patient Vitals for the past 24 hrs:   Temp Pulse Resp BP SpO2   08/04/22 1201 97.9 °F (36.6 °C) (!) 101 18 134/64 100 %   08/04/22 0718 98.4 °F (36.9 °C) (!) 110 18 (!) 141/60 94 %   08/04/22 0424 97.9 °F (36.6 °C) (!) 102 18 (!) 139/58 97 %   08/03/22 2328 98.2 °F (36.8 °C) 99 16 (!) 110/46 99 %   08/03/22 2010 98.2 °F (36.8 °C) 100 16 129/64 96 %   08/03/22 1538 98.8 °F (37.1 °C) (!) 102 17 136/72 99 %       Oxygen Therapy  SpO2: 94 %  Pulse via Oximetry: 101 beats per minute  Pulse Oximeter Device Mode: Continuous  O2 Device: None (Room air)    Estimated body mass index is 22.03 kg/m² as calculated from the following:    Height as of this encounter: 5' 2.4\" (1.868 m).    Weight as of this encounter: 122 lb (55.3 kg). Intake/Output Summary (Last 24 hours) at 8/4/2022 1006  Last data filed at 8/3/2022 1508  Gross per 24 hour   Intake 100 ml   Output --   Net 100 ml         Physical Exam:     Blood pressure (!) 141/60, pulse (!) 110, temperature 98.4 °F (36.9 °C), temperature source Oral, resp. rate 18, height 5' 2.4\" (1.585 m), weight 122 lb (55.3 kg), SpO2 94 %. General:    Well nourished. Head:  Normocephalic, atraumatic  Eyes:  Sclerae appear normal.  Pupils equally round. ENT:  Nares appear normal, no drainage. Moist oral mucosa  Neck:  No restricted ROM. Trachea midline   CV:   Mild tachycardia; no m/r/g. No jugular venous distension. Lungs:   CTAB. No wheezing, rhonchi, or rales. Symmetric expansion. Abdomen:   Hypertympanic on percussion x 4 quad with hypoactive BS LLQ; otherwise absent bowel sound  Extremities: No cyanosis or clubbing. No edema  Skin:     No rashes and normal coloration. Warm and dry. Neuro:  CN II-XII grossly intact. Sensation intact. A&Ox3  Psych:  Normal mood and affect.       I have personally reviewed labs and tests showing:  Recent Labs:  Recent Results (from the past 48 hour(s))   Lactic Acid    Collection Time: 08/02/22 11:43 AM   Result Value Ref Range    Lactic Acid, Plasma 1.8 0.4 - 2.0 MMOL/L   CBC with Auto Differential    Collection Time: 08/02/22 11:45 AM   Result Value Ref Range    WBC 9.8 4.3 - 11.1 K/uL    RBC 4.40 4.05 - 5.2 M/uL    Hemoglobin 13.2 11.7 - 15.4 g/dL    Hematocrit 39.2 35.8 - 46.3 %    MCV 89.1 79.6 - 97.8 FL    MCH 30.0 26.1 - 32.9 PG    MCHC 33.7 31.4 - 35.0 g/dL    RDW 12.3 11.9 - 14.6 %    Platelets 711 546 - 595 K/uL    MPV 9.1 (L) 9.4 - 12.3 FL    nRBC 0.00 0.0 - 0.2 K/uL    Differential Type AUTOMATED      Seg Neutrophils 79 (H) 43 - 78 %    Lymphocytes 8 (L) 13 - 44 %    Monocytes 13 (H) 4.0 - 12.0 %    Eosinophils % 0 (L) 0.5 - 7.8 %    Basophils 0 0.0 - 2.0 %    Immature Granulocytes 0 0.0 - 5.0 %    Segs Absolute 7.7 1.7 - 8.2 K/UL    Absolute Lymph # 0.8 0.5 - 4.6 K/UL    Absolute Mono # 1.2 0.1 - 1.3 K/UL    Absolute Eos # 0.0 0.0 - 0.8 K/UL    Basophils Absolute 0.0 0.0 - 0.2 K/UL    Absolute Immature Granulocyte 0.0 0.0 - 0.5 K/UL   Comprehensive Metabolic Panel    Collection Time: 08/02/22 11:45 AM   Result Value Ref Range    Sodium 128 (L) 136 - 145 mmol/L    Potassium 2.7 (L) 3.5 - 5.1 mmol/L    Chloride 86 (L) 98 - 107 mmol/L    CO2 33 (H) 21 - 32 mmol/L    Anion Gap 9 7 - 16 mmol/L    Glucose 130 (H) 65 - 100 mg/dL    BUN 29 (H) 8 - 23 MG/DL    Creatinine 1.10 (H) 0.6 - 1.0 MG/DL    GFR African American >60 >60 ml/min/1.73m2    GFR Non- 51 (L) >60 ml/min/1.73m2    Calcium 8.5 8.3 - 10.4 MG/DL    Total Bilirubin 1.1 0.2 - 1.1 MG/DL    ALT 24 12 - 65 U/L    AST 21 15 - 37 U/L    Alk Phosphatase 59 50 - 136 U/L    Total Protein 6.4 6.3 - 8.2 g/dL    Albumin 3.0 (L) 3.2 - 4.6 g/dL    Globulin 3.4 2.3 - 3.5 g/dL    Albumin/Globulin Ratio 0.9 (L) 1.2 - 3.5     Lipase    Collection Time: 08/02/22 11:45 AM   Result Value Ref Range    Lipase 97 73 - 393 U/L   Magnesium    Collection Time: 08/02/22 11:45 AM   Result Value Ref Range    Magnesium 2.3 1.8 - 2.4 mg/dL   Magnesium    Collection Time: 08/02/22 11:45 AM   Result Value Ref Range    Magnesium 2.6 (H) 1.8 - 2.4 mg/dL   EKG 12 Lead    Collection Time: 08/02/22 12:03 PM   Result Value Ref Range    Ventricular Rate 107 BPM    Atrial Rate 107 BPM    P-R Interval 136 ms    QRS Duration 87 ms    Q-T Interval 360 ms    QTc Calculation (Bazett) 481 ms    P Axis 45 degrees    R Axis 67 degrees    T Axis -10 degrees    Diagnosis Sinus tachycardia    Urinalysis    Collection Time: 08/02/22 12:31 PM   Result Value Ref Range    Color, UA DARK YELLOW      Appearance CLOUDY      Specific Gravity, UA 1.023 1.001 - 1.023      pH, Urine 6.0 5.0 - 9.0      Protein,  (A) NEG mg/dL    Glucose, UA Negative mg/dL    Ketones, Urine 15 (A) NEG mg/dL    Bilirubin Urine Negative NEG      Blood, Urine LARGE (A) NEG      Urobilinogen, Urine 0.2 0.2 - 1.0 EU/dL    Nitrite, Urine Negative NEG      Leukocyte Esterase, Urine SMALL (A) NEG      WBC, UA 0-3 0 /hpf    RBC, UA 20-50 0 /hpf    Epithelial Cells UA 10-20 0 /hpf    BACTERIA, URINE 2+ (H) 0 /hpf    Casts HYALINE 0 /lpf    AMORPHOUS CRYSTAL TRACE 0      Mucus, UA 3+ (H) 0 /lpf   PLEASE READ & DOCUMENT PPD TEST IN 24 HRS    Collection Time: 08/03/22 12:00 AM   Result Value Ref Range    PPD, (POC) Negative Negative    mm Induration 0 0 - 5 mm   Basic Metabolic Panel w/ Reflex to MG    Collection Time: 08/03/22  5:11 AM   Result Value Ref Range    Sodium 133 (L) 136 - 145 mmol/L    Potassium 3.4 (L) 3.5 - 5.1 mmol/L    Chloride 99 98 - 107 mmol/L    CO2 28 21 - 32 mmol/L    Anion Gap 6 (L) 7 - 16 mmol/L    Glucose 79 65 - 100 mg/dL    BUN 18 8 - 23 MG/DL    Creatinine 0.70 0.6 - 1.0 MG/DL    GFR African American >60 >60 ml/min/1.73m2    GFR Non- >60 >60 ml/min/1.73m2    Calcium 8.1 (L) 8.3 - 10.4 MG/DL   Phosphorus    Collection Time: 08/03/22  5:11 AM   Result Value Ref Range    Phosphorus 1.8 (L) 2.3 - 3.7 MG/DL   Magnesium    Collection Time: 08/03/22  5:11 AM   Result Value Ref Range    Magnesium 2.4 1.8 - 2.4 mg/dL   Basic Metabolic Panel w/ Reflex to MG    Collection Time: 08/04/22  4:10 AM   Result Value Ref Range    Sodium 138 136 - 145 mmol/L    Potassium 3.8 3.5 - 5.1 mmol/L    Chloride 107 98 - 107 mmol/L    CO2 20 (L) 21 - 32 mmol/L    Anion Gap 11 7 - 16 mmol/L    Glucose 44 (L) 65 - 100 mg/dL    BUN 15 8 - 23 MG/DL    Creatinine 0.50 (L) 0.6 - 1.0 MG/DL    GFR African American >60 >60 ml/min/1.73m2    GFR Non- >60 >60 ml/min/1.73m2    Calcium 7.7 (L) 8.3 - 10.4 MG/DL   CBC    Collection Time: 08/04/22  4:10 AM   Result Value Ref Range    WBC 8.0 4.3 - 11.1 K/uL    RBC 4.04 (L) 4.05 - 5.2 M/uL    Hemoglobin 12.3 11.7 - 15.4 g/dL    Hematocrit 37.8 35.8 - 46.3 % MCV 93.6 79.6 - 97.8 FL    MCH 30.4 26.1 - 32.9 PG    MCHC 32.5 31.4 - 35.0 g/dL    RDW 12.6 11.9 - 14.6 %    Platelets 183 907 - 074 K/uL    MPV 9.3 (L) 9.4 - 12.3 FL    nRBC 0.00 0.0 - 0.2 K/uL   Phosphorus    Collection Time: 08/04/22  4:10 AM   Result Value Ref Range    Phosphorus 1.6 (L) 2.3 - 3.7 MG/DL   Hepatic Function Panel    Collection Time: 08/04/22  4:10 AM   Result Value Ref Range    Total Protein 5.5 (L) 6.3 - 8.2 g/dL    Albumin 2.4 (L) 3.2 - 4.6 g/dL    Globulin 3.1 2.3 - 3.5 g/dL    Albumin/Globulin Ratio 0.8 (L) 1.2 - 3.5      Total Bilirubin 0.6 0.2 - 1.1 MG/DL    Bilirubin, Direct 0.2 <0.4 MG/DL    Alk Phosphatase 52 50 - 136 U/L    AST 19 15 - 37 U/L    ALT 18 12 - 65 U/L       I have personally reviewed imaging studies showing: Other Studies:  XR ABDOMEN (KUB) (SINGLE AP VIEW)   Final Result      RESIDUAL BARIUM IN THE PROXIMAL COLON FROM RECENT CT WOULD RENDER A SMALL BOWEL   SERIES SUBOPTIMAL IF NOT NONDIAGNOSTIC, AND THEREFORE IT IS DEFERRED PENDING A   COLON PREP. XR ABDOMEN (KUB) (SINGLE AP VIEW)   Final Result   NG tube tube tip is coiled once in the stomach with tip near the   midline. XR ABDOMEN (KUB) (SINGLE AP VIEW)   Final Result   FINDINGS / IMPRESSION:  Persistent dilated small bowel loops. There is some   contrast in the colon. NG tube is present. XR ABDOMEN (KUB) (SINGLE AP VIEW)   Final Result      1. Findings as described above. XR ABDOMEN (KUB) (SINGLE AP VIEW)   Final Result   Nasogastric tube proximal sidehole is at the gastroesophageal   junction. Advancement by approximately 10 cm is suggested. CT ABDOMEN PELVIS W IV CONTRAST Additional Contrast? Oral   Final Result   Small bowel obstruction with transition point in the right lower quadrant. XR ACUTE ABD SERIES CHEST 1 VW   Final Result   1. Persistent gaseous distention of small bowel.    2.  No acute findings in the chest.          Current Meds:  Current Facility-Administered Medications   Medication Dose Route Frequency    bisacodyl (DULCOLAX) suppository 10 mg  10 mg Rectal Daily    sodium phosphate 7.5 mmol in sodium chloride 0.9 % 250 mL IVPB  7.5 mmol IntraVENous PRN    Or    sodium phosphate 15 mmol in sodium chloride 0.9 % 250 mL IVPB  15 mmol IntraVENous PRN    pantoprazole (PROTONIX) 40 mg in sodium chloride (PF) 10 mL injection  40 mg IntraVENous Daily    sodium chloride flush 0.9 % injection 5-40 mL  5-40 mL IntraVENous 2 times per day    sodium chloride flush 0.9 % injection 5-40 mL  5-40 mL IntraVENous PRN    0.9 % sodium chloride infusion   IntraVENous PRN    potassium chloride (KLOR-CON M) extended release tablet 40 mEq  40 mEq Oral PRN    Or    potassium bicarb-citric acid (EFFER-K) effervescent tablet 40 mEq  40 mEq Oral PRN    Or    potassium chloride 10 mEq/100 mL IVPB (Peripheral Line)  10 mEq IntraVENous PRN    magnesium sulfate 2000 mg in 50 mL IVPB premix  2,000 mg IntraVENous PRN    HYDROmorphone HCl PF (DILAUDID) injection 0.25 mg  0.25 mg IntraVENous Q3H PRN    Or    HYDROmorphone HCl PF (DILAUDID) injection 0.5 mg  0.5 mg IntraVENous Q3H PRN    promethazine (PHENERGAN) tablet 12.5 mg  12.5 mg Oral Q6H PRN    Or    ondansetron (ZOFRAN) injection 4 mg  4 mg IntraVENous Q6H PRN    enoxaparin (LOVENOX) injection 40 mg  40 mg SubCUTAneous Daily    0.9% NaCl with KCl 40 mEq infusion   IntraVENous Continuous       Signed:  Rodrigo Chung, S2      Addendum:  Pt seen and examined with PA student, discussed plan of care with student. Minimal flatulence this AM reported by patient; SBFT ordered per surgery today. Cont NGT to intermittent suction / supportive care. Agree with plan of care as outlined above.      Steven Tenorio, [employer]

## 2022-08-04 NOTE — PROGRESS NOTES
Admit Date: 2022    POD * No surgery found *    Procedure:  * No surgery found *    Subjective:     No issues overnight. + flatus. No BM. No suppository given yesterday. Objective:       Vitals:    22 2328 22 0424 22 0718   BP: 129/64 (!) 110/46 (!) 139/58 (!) 141/60   Pulse: 100 99 (!) 102 (!) 110   Resp:  18   Temp: 98.2 °F (36.8 °C) 98.2 °F (36.8 °C) 97.9 °F (36.6 °C) 98.4 °F (36.9 °C)   TempSrc: Oral Oral Oral Oral   SpO2: 96% 99% 97% 94%   Weight:       Height:           Temp (24hrs), Av.4 °F (36.9 °C), Min:97.9 °F (36.6 °C), Max:98.8 °F (37.1 °C)    Intake/Output Summary (Last 24 hours) at 2022 9473  Last data filed at 8/3/2022 1508  Gross per 24 hour   Intake 100 ml   Output --   Net 100 ml             Physical Exam:   Physical Exam  HENT:      Mouth/Throat:      Mouth: Mucous membranes are moist.   Cardiovascular:      Rate and Rhythm: Normal rate. Heart sounds: Normal heart sounds. Comments: Slightly tachy  Pulmonary:      Effort: Pulmonary effort is normal.      Breath sounds: Normal breath sounds. Abdominal:      Palpations: Abdomen is soft. Comments: Slightly distended. BS scant. Mild Tenderness centrally   Neurological:      Mental Status: She is alert.          Labs:   Recent Results (from the past 24 hour(s))   Basic Metabolic Panel w/ Reflex to MG    Collection Time: 22  4:10 AM   Result Value Ref Range    Sodium 138 136 - 145 mmol/L    Potassium 3.8 3.5 - 5.1 mmol/L    Chloride 107 98 - 107 mmol/L    CO2 20 (L) 21 - 32 mmol/L    Anion Gap 11 7 - 16 mmol/L    Glucose 44 (L) 65 - 100 mg/dL    BUN 15 8 - 23 MG/DL    Creatinine 0.50 (L) 0.6 - 1.0 MG/DL    GFR African American >60 >60 ml/min/1.73m2    GFR Non- >60 >60 ml/min/1.73m2    Calcium 7.7 (L) 8.3 - 10.4 MG/DL   CBC    Collection Time: 22  4:10 AM   Result Value Ref Range    WBC 8.0 4.3 - 11.1 K/uL    RBC 4.04 (L) 4.05 - 5.2 M/uL    Hemoglobin 12.3 (overactive bladder)    Acute hypokalemia    Hyponatremia    Prerenal azotemia    Nausea and vomiting    Sinus tachycardia    Abnormal CT of the abdomen  Resolved Problems:    * No resolved hospital problems.  *    SBO     Plan/Recommendations/Medical Decision Making:   Continue non surgical management with bowel rest and IVF  Cont NPO, NGT to LWIS  Lytes improved - replace phos  Cr improved hydration  SBFT ordered      Umberto Bains MD

## 2022-08-05 ENCOUNTER — APPOINTMENT (OUTPATIENT)
Dept: GENERAL RADIOLOGY | Age: 82
DRG: 389 | End: 2022-08-05
Payer: MEDICARE

## 2022-08-05 LAB
ANION GAP SERPL CALC-SCNC: 9 MMOL/L (ref 7–16)
BUN SERPL-MCNC: 10 MG/DL (ref 8–23)
CALCIUM SERPL-MCNC: 7.6 MG/DL (ref 8.3–10.4)
CHLORIDE SERPL-SCNC: 108 MMOL/L (ref 98–107)
CO2 SERPL-SCNC: 21 MMOL/L (ref 21–32)
CREAT SERPL-MCNC: 0.4 MG/DL (ref 0.6–1)
GLUCOSE SERPL-MCNC: 40 MG/DL (ref 65–100)
MM INDURATION, POC: 0 MM (ref 0–5)
PHOSPHATE SERPL-MCNC: 1.3 MG/DL (ref 2.3–3.7)
POTASSIUM SERPL-SCNC: 4.2 MMOL/L (ref 3.5–5.1)
PPD, POC: NEGATIVE
SODIUM SERPL-SCNC: 138 MMOL/L (ref 136–145)

## 2022-08-05 PROCEDURE — 1100000000 HC RM PRIVATE

## 2022-08-05 PROCEDURE — 36415 COLL VENOUS BLD VENIPUNCTURE: CPT

## 2022-08-05 PROCEDURE — 74018 RADEX ABDOMEN 1 VIEW: CPT

## 2022-08-05 PROCEDURE — 87086 URINE CULTURE/COLONY COUNT: CPT

## 2022-08-05 PROCEDURE — 84100 ASSAY OF PHOSPHORUS: CPT

## 2022-08-05 PROCEDURE — 99231 SBSQ HOSP IP/OBS SF/LOW 25: CPT | Performed by: SURGERY

## 2022-08-05 PROCEDURE — 2580000003 HC RX 258: Performed by: PHYSICIAN ASSISTANT

## 2022-08-05 PROCEDURE — C9113 INJ PANTOPRAZOLE SODIUM, VIA: HCPCS | Performed by: INTERNAL MEDICINE

## 2022-08-05 PROCEDURE — 97110 THERAPEUTIC EXERCISES: CPT

## 2022-08-05 PROCEDURE — 2500000003 HC RX 250 WO HCPCS: Performed by: PHYSICIAN ASSISTANT

## 2022-08-05 PROCEDURE — 6370000000 HC RX 637 (ALT 250 FOR IP): Performed by: SURGERY

## 2022-08-05 PROCEDURE — 80048 BASIC METABOLIC PNL TOTAL CA: CPT

## 2022-08-05 PROCEDURE — 2580000003 HC RX 258: Performed by: INTERNAL MEDICINE

## 2022-08-05 PROCEDURE — 6360000002 HC RX W HCPCS: Performed by: INTERNAL MEDICINE

## 2022-08-05 PROCEDURE — A4216 STERILE WATER/SALINE, 10 ML: HCPCS | Performed by: INTERNAL MEDICINE

## 2022-08-05 PROCEDURE — 97116 GAIT TRAINING THERAPY: CPT

## 2022-08-05 RX ORDER — POLYETHYLENE GLYCOL 3350 17 G/17G
17 POWDER, FOR SOLUTION ORAL DAILY
Status: DISCONTINUED | OUTPATIENT
Start: 2022-08-05 | End: 2022-08-09 | Stop reason: HOSPADM

## 2022-08-05 RX ORDER — SODIUM CHLORIDE 9 MG/ML
INJECTION, SOLUTION INTRAVENOUS CONTINUOUS
Status: DISCONTINUED | OUTPATIENT
Start: 2022-08-05 | End: 2022-08-06

## 2022-08-05 RX ADMIN — SODIUM PHOSPHATE, MONOBASIC, MONOHYDRATE 20 MMOL: 276; 142 INJECTION, SOLUTION INTRAVENOUS at 15:01

## 2022-08-05 RX ADMIN — SODIUM CHLORIDE: 9 INJECTION, SOLUTION INTRAVENOUS at 22:27

## 2022-08-05 RX ADMIN — SODIUM CHLORIDE: 9 INJECTION, SOLUTION INTRAVENOUS at 14:05

## 2022-08-05 RX ADMIN — POTASSIUM CHLORIDE AND SODIUM CHLORIDE: 900; 300 INJECTION, SOLUTION INTRAVENOUS at 13:32

## 2022-08-05 RX ADMIN — POLYETHYLENE GLYCOL 3350 17 G: 17 POWDER, FOR SOLUTION ORAL at 11:46

## 2022-08-05 RX ADMIN — SODIUM CHLORIDE, PRESERVATIVE FREE 10 ML: 5 INJECTION INTRAVENOUS at 20:10

## 2022-08-05 RX ADMIN — ENOXAPARIN SODIUM 40 MG: 100 INJECTION SUBCUTANEOUS at 08:30

## 2022-08-05 RX ADMIN — SODIUM CHLORIDE 40 MG: 9 INJECTION INTRAMUSCULAR; INTRAVENOUS; SUBCUTANEOUS at 08:29

## 2022-08-05 RX ADMIN — BISACODYL 10 MG: 10 SUPPOSITORY RECTAL at 08:30

## 2022-08-05 RX ADMIN — POTASSIUM CHLORIDE AND SODIUM CHLORIDE: 900; 300 INJECTION, SOLUTION INTRAVENOUS at 04:15

## 2022-08-05 ASSESSMENT — PAIN SCALES - GENERAL
PAINLEVEL_OUTOF10: 0
PAINLEVEL_OUTOF10: 0

## 2022-08-05 NOTE — PLAN OF CARE
Problem: Discharge Planning  Goal: Discharge to home or other facility with appropriate resources  8/4/2022 2224 by Raysa Robledo RN  Outcome: Progressing  Flowsheets (Taken 8/4/2022 1913)  Discharge to home or other facility with appropriate resources: Identify barriers to discharge with patient and caregiver  8/4/2022 1203 by Ac Galvan RN  Outcome: Progressing  Flowsheets (Taken 8/4/2022 6964)  Discharge to home or other facility with appropriate resources: Identify barriers to discharge with patient and caregiver     Problem: Pain  Goal: Verbalizes/displays adequate comfort level or baseline comfort level  8/4/2022 2224 by Raysa Robledo RN  Outcome: Progressing  Flowsheets (Taken 8/4/2022 1913)  Verbalizes/displays adequate comfort level or baseline comfort level:   Encourage patient to monitor pain and request assistance   Assess pain using appropriate pain scale  8/4/2022 1203 by Ac Galvan RN  Outcome: Progressing

## 2022-08-05 NOTE — PROGRESS NOTES
Pt sitting up in bed. Pt on RA. NGT to LIS. Approximately 150 cc of output for this shift. No n/v or pain at this time. Call light in reach, will monitor.

## 2022-08-05 NOTE — PROGRESS NOTES
PHYSICAL THERAPY: Daily Note AM   (Link to Caseload Tracking: PT Visit Days : 3  Time In/Out PT Charge Capture  Rehab Caseload Tracker  Orders    Elba Moncada is a 80 y.o. female   PRIMARY DIAGNOSIS: Small bowel obstruction ([facility] [address]  Hypokalemia [E87.6]  Hyponatremia [E87.1]  Small bowel obstruction ([facility] [address] [K56.609]       Inpatient: Payor: MEDICARE / Plan: MEDICARE PART A AND B / Product Type: *No Product type* /     ASSESSMENT:     REHAB RECOMMENDATIONS:   Recommendation to date pending progress:  Setting:  Home Health Therapy    Equipment:    To Be Determined     ASSESSMENT:  Ms. Mayte Kathleen was received supine in bed, agreeable to therapy. She was able to ambulate 60 ft within room with RW and SBA. Balance improved with use of RW. She also participated in standing and seated exercises for balance and strengthening, tolerated well. Good progress, will continue to follow.       SUBJECTIVE:   Ms. Mayte Kathleen states, \"I'm a little unsteady\"     Social/Functional Lives With: Spouse  Type of Home: House  Home Layout: One level  Home Access: Stairs to enter with rails  Entrance Stairs - Number of Steps: 4  Has the patient had two or more falls in the past year or any fall with injury in the past year?: No  ADL Assistance: Independent  Ambulation Assistance: Independent  Transfer Assistance: Independent  OBJECTIVE:     PAIN: Abril Leather / O2: Sherre Finder / Pete Kg / Krista [place]:   Pre Treatment:   Pain Assessment: None - Denies Pain      Post Treatment: 0 Vitals        Oxygen    IV and Nasogastric Tube    RESTRICTIONS/PRECAUTIONS:        MOBILITY: I Mod I S SBA CGA Min Mod Max Total  NT x2 Comments:   Bed Mobility    Rolling [] [] [x] [] [] [] [] [] [] [] []    Supine to Sit [] [] [x] [] [] [] [] [] [] [] []    Scooting [] [] [x] [] [] [] [] [] [] [] []    Sit to Supine [] [] [x] [] [] [] [] [] [] [] []    Transfers    Sit to Stand [] [] [] [x] [] [] [] [] [] [] []    Bed to Chair [] [] [] [] [] [] [] [] [] [x] []    Stand to Sit [] [] [] [x] [] [] [] [] [] [] []     [] [] [] [] [] [] [] [] [] [] []    I=Independent, Mod I=Modified Independent, S=Supervision, SBA=Standby Assistance, CGA=Contact Guard Assistance,   Min=Minimal Assistance, Mod=Moderate Assistance, Max=Maximal Assistance, Total=Total Assistance, NT=Not Tested    BALANCE: Good Fair+ Fair Fair- Poor NT Comments   Sitting Static [x] [] [] [] [] []    Sitting Dynamic [x] [] [] [] [] []              Standing Static [] [x] [] [] [] []    Standing Dynamic [] [x] [] [] [] []      GAIT: I Mod I S SBA CGA Min Mod Max Total  NT x2 Comments:   Level of Assistance [] [] [] [x] [] [] [] [] [] [] []    Distance 60 feet    DME Rolling Walker    Gait Quality Decreased step clearance and Decreased step length    Weightbearing Status      Stairs      I=Independent, Mod I=Modified Independent, S=Supervision, SBA=Standby Assistance, CGA=Contact Guard Assistance,   Min=Minimal Assistance, Mod=Moderate Assistance, Max=Maximal Assistance, Total=Total Assistance, NT=Not Tested    PLAN:   ACUTE PHYSICAL THERAPY GOALS:   (Developed with and agreed upon by patient and/or caregiver.)  (1.) Vivi Araiza  will move from supine to sit and sit to supine  with INDEPENDENCE within 7 treatment day(s). (2.) Vivi Araiza will transfer from bed to chair and chair to bed with MODIFIED INDEPENDENCE using the least restrictive device within 7 treatment day(s). (3.) Vivi Araiza will ambulate with MODIFIED INDEPENDENCE for 500 feet with the least restrictive device within 7 treatment day(s). (4.) Vivi Araiza will perform standing static and dynamic balance activities x 10 minutes with SUPERVISION to improve safety within 7 treatment day(s). (5.) Vivi Araiza will ascend and descend 4 stairs using 1 hand rail(s) with SUPERVISION to improve functional mobility and safety within 7 treatment day(s).   (6.) Vivi Araiza will perform bilateral lower extremity exercises x 20 min for HEP with INDEPENDENCE to improve strength, endurance, and functional mobility within 7 treatment day(s). FREQUENCY AND DURATION: 3 times/week for duration of hospital stay or until stated goals are met, whichever comes first.    TREATMENT:   TREATMENT:   Therapeutic Exercise (10 Minutes): Therapeutic exercises noted below to improve functional activity tolerance, strength, mobility, and balance. Gait Training (18 Minutes): Gait training for 60 feet utilizing [facility]. Patient required Verbal cueing to improve Activity Pacing and Assistive Device Utilization. TREATMENT GRID:   Date:  8/5/22 Date:   Date:     Activity/Exercise Parameters Parameters Parameters   Standing hip flex/abd/ext X 5 B     Seated Marching X 10 B     LAQs X 10 B                                 AFTER TREATMENT PRECAUTIONS: Bed, Bed/Chair Locked, Call light within reach, Needs within reach, RN notified, and Visitors at bedside    INTERDISCIPLINARY COLLABORATION:  RN/ PCT and PT/ PTA    EDUCATION: Education Given To: Patient; Family  Education Provided: Role of Therapy;Plan of Care;Home Exercise Program;Fall Prevention Strategies; Equipment;Transfer Training  Education Method: Verbal  Barriers to Learning: None  Education Outcome: Verbalized understanding    TIME IN/OUT:  Time In: 1548  Time Out: [id number] [phone]  Minutes: Radha QUISPE, PT

## 2022-08-05 NOTE — PROGRESS NOTES
Admit Date: 2022    POD * No surgery found *    Procedure:  * No surgery found *    Subjective:     Pt sitting in side of the bed. No issues overnight. Reports one episode of flatus this am and one small BM. NG tube in place, no documented output. Denies belching, nausea, and vomiting. AF, NAD. AF, NAD.     KUB today showing  Impression   1. Oral contrast progression to the distal colon, excluding high-grade   obstruction. 2.  Persistent dilated small bowel loops within the midabdomen. Objective:       Vitals:    22 0318 22 0753 22 1113 22 1522   BP: (!) 144/61 130/62 124/71 (!) 135/53   Pulse: 99 99 100 (!) 101   Resp: 18    Temp: 98.6 °F (37 °C) 98.4 °F (36.9 °C) 99.3 °F (37.4 °C) 99.1 °F (37.3 °C)   TempSrc: Oral Oral Oral Oral   SpO2: 98% 98% 99% 100%   Weight:       Height:           Temp (24hrs), Av.9 °F (37.2 °C), Min:98.4 °F (36.9 °C), Max:99.3 °F (37.4 °C)    Intake/Output Summary (Last 24 hours) at 2022 1557  Last data filed at 2022 1312  Gross per 24 hour   Intake 0 ml   Output --   Net 0 ml             Physical Exam:   Physical Exam  HENT:      Mouth/Throat:      Mouth: Mucous membranes are moist.   Cardiovascular:      Rate and Rhythm: Normal rate. Heart sounds: Normal heart sounds. Comments: Slightly tachy  Pulmonary:      Effort: Pulmonary effort is normal.      Breath sounds: Normal breath sounds. Abdominal:      Palpations: Abdomen is soft. Comments: Slightly distended. BS active. Non-tender   Neurological:      Mental Status: She is alert.          Labs:   Recent Results (from the past 24 hour(s))   PLEASE READ & DOCUMENT PPD TEST IN 48 HRS    Collection Time: 22 10:27 PM   Result Value Ref Range    PPD, (POC) Negative Negative    mm Induration 0 0 - 5 mm   Basic Metabolic Panel w/ Reflex to MG    Collection Time: 22  5:11 AM   Result Value Ref Range    Sodium 138 136 - 145 mmol/L    Potassium 4.2 3.5 - 5.1 mmol/L    Chloride 108 (H) 98 - 107 mmol/L    CO2 21 21 - 32 mmol/L    Anion Gap 9 7 - 16 mmol/L    Glucose 40 (L) 65 - 100 mg/dL    BUN 10 8 - 23 MG/DL    Creatinine 0.40 (L) 0.6 - 1.0 MG/DL    GFR African American >60 >60 ml/min/1.73m2    GFR Non- >60 >60 ml/min/1.73m2    Calcium 7.6 (L) 8.3 - 10.4 MG/DL   Phosphorus    Collection Time: 08/05/22  5:11 AM   Result Value Ref Range    Phosphorus 1.3 (L) 2.3 - 3.7 MG/DL       Data Review as above    XR Results (most recent):  XR ABDOMEN (KUB) (SINGLE AP VIEW)  Narrative: EXAMINATION: XR ABDOMEN (KUB) (SINGLE AP VIEW) 8/5/2022 9:16 AM    ACCESSION NUMBER: [id number]    COMPARISON: Radiograph dated 8/4/2022    INDICATION: pre SBFT to survey for contrast    TECHNIQUE: A single AP supine view of the abdomen was obtained. FINDINGS:     Support devices: Enteric tube coils within the stomach with the tip overlying  the gastric fundus. Bowel: Dilated loops of small bowel within the lower abdomen persist. Oral  contrast has progressed to the distal colon. Soft Tissues: Unremarkable. Osseous Structures: No acute osseous abnormality. Impression: 1. Oral contrast progression to the distal colon, excluding high-grade  obstruction. 2.  Persistent dilated small bowel loops within the midabdomen. Assessment:     Principal Problem:    Small bowel obstruction (HCC)  Active Problems:    OAB (overactive bladder)    Acute hypokalemia    Hyponatremia    Prerenal azotemia    Nausea and vomiting    Sinus tachycardia    Abnormal CT of the abdomen  Resolved Problems:    * No resolved hospital problems. *    SBO     Plan/Recommendations/Medical Decision Making:   Care Management per Hospitalist  Continue non surgical management with bowel rest and IVF  Cont NPO, NGT to LWIS  Phos 1.3 - replaced per hospitalist  Cr . 40  Miralax  SBFT ordered      Chandler Lamb NP

## 2022-08-05 NOTE — PROGRESS NOTES
Pt resting in bed with  at bedside. NGT remains to LIS. Pt remains NPO with no further BM for this shift. Pt denies pain or distress. Call light in reach, will monitor.

## 2022-08-05 NOTE — PROGRESS NOTES
Pt ambulating in room. Pt returning from RR and reports having small BM. Bowel sounds not heard at this time. Pt has NGT to LIS. Pt denies pain,  distress or nausea at this time. Pt NPO . Pt encouraged to call for assistance if needed call light in reach, will monitor.

## 2022-08-05 NOTE — PROGRESS NOTES
Hospitalist Progress Note   Admit Date:  2022 11:25 AM   Name:  Marta Adan   Age:  80 y.o. Sex:  female  :  1940   MRN:  [id number]   Room:  [facility]/    Presenting Complaint: Emesis     Reason(s) for Admission: Hypokalemia [E87.6]  Hyponatremia [E87.1]  Small bowel obstruction [facility] Course & Interval History:   Vinay Campos (\"Laverle\") is a 77-year-old  female with history of tachycardia, cholecystectomy, total abdominal hysterectomy, & IBS w/ diarrhea who presented to the ED on  via EMS due to labs showing hyponatremia from Urgent Care w/ chief complaint of N/V described as bilious onset  & accompanying constipation. She had distention & a bloated feeling at presentation. She endorsed last BM  & denies passing flatus since that time, despite attempting a suppository prior to going to Urgent Care. She was seen in ED on  w/ chief complaint of increasing lower abdominal pain onset  w/ aforementioned N/V onset . She described pain as achy & sharp, moving from in her epigastric region to her lower abdomen. She took Zofran  w/o relief. She denied experiencing similar pain before. She had R lower abdominal TTP at that time. She was diagnosed with likely enteritis based on CT scan & d/c'd home w/ prescription for Zofran, as well as directions to f/u w/ PCP. Found in ED on  to have hypokalemia, hyponatremia, & SBO w/ transition point in the RLQ based on CTAP  & XR KUB  supporting bowel distention. NG tube to LWIS placed. Admitted to hospital for SBO. Patient transferred to 7th floor from ED around 6:30 PM.     Reports Ih/o BS w/ diarrhea for \"many years\": States that has taken cholestyramine light most mornings for these years for diarrhea, but then not taken it days when feels constipated.  States that usually just one day of constipation and no cholestyramine light and then diarrhea is back again so starts taking. Reports last colonoscopy was 4 years ago, during which polyps were removed, but denies any notification of concerning findings by PCP at Counts include [facility] beds at the [facility]. Colonoscopy also 05/18/10    Reports family history: mother had colorectal cancer found on routine colonoscopy & treated successfully with colon resection & 1 medication w/o return. Father had \"really bad\" diabetes & heart attack in his 66's. Subjective/24hr Events (08/05/22): \"I had little BM today and I'm passing gas. \"  Pleasant 81y.o. WF sitting up in chair in NAD    Admits to flatulence and small BM yesterday; denies N/V, abd pain, chest, pain, dyspnea    Assessment & Plan:     Principal Problem:        Small bowel obstruction  - repeated KUB this AM persistent dilated small bowel loops  - repeat KUB in AM; cont bowel regimen / NGT to intermittent suction  - appreciate surgery's ongoing assistance  - cont supportive care     Active Problems:       Hypocalcemia  - Likely due to hypoalbuminemia. Will continue to monitor. Repeat BMP AM.      Hypophosphatemia  - phos ordered; RN verified to replace  - f/u AM labs      Hypokalemia  - attributed to GI loss, resolved      Rule out UTI  - admission UA with large blood, dark, small leuks, 2+ bactereia, nitrite negative  - urine culture pending       Chronic mild sinus tachycardia  - consider starting oral BB once SBO resolves; closely monitor; sees cards outpatient       OAB (overactive bladder)  - aware; oral meds held given SBO       Discharge Planning:    - pending clinical course    Diet:  Diet NPO Exceptions are: Ice Chips  DVT PPx: Lovenox  Code status: Full Code    Hospital Problems:  Principal Problem:    Small bowel obstruction (HCC)  Active Problems:    OAB (overactive bladder)    Acute hypokalemia    Hyponatremia    Prerenal azotemia    Nausea and vomiting    Sinus tachycardia    Abnormal CT of the abdomen  Resolved Problems:    * No resolved hospital problems.  *      Objective:   Patient Vitals for the past 24 hrs:   Temp Pulse Resp BP SpO2   08/05/22 1113 99.3 °F (37.4 °C) 100 20 124/71 99 %   08/05/22 0753 98.4 °F (36.9 °C) 99 22 130/62 98 %   08/05/22 0318 98.6 °F (37 °C) 99 18 (!) 144/61 98 %   08/04/22 2330 98.7 °F (37.1 °C) 97 16 (!) 133/58 98 %   08/04/22 1944 99.3 °F (37.4 °C) (!) 104 16 135/63 96 %   08/04/22 1509 98.8 °F (37.1 °C) (!) 103 17 (!) 144/65 99 %       Oxygen Therapy  SpO2: 99 %  Pulse via Oximetry: 101 beats per minute  Pulse Oximeter Device Mode: Continuous  O2 Device: None (Room air)    Estimated body mass index is 22.03 kg/m² as calculated from the following:    Height as of this encounter: 5' 2.4\" (1.585 m). Weight as of this encounter: 122 lb (55.3 kg). Intake/Output Summary (Last 24 hours) at 8/5/2022 1347  Last data filed at 8/5/2022 1312  Gross per 24 hour   Intake 0 ml   Output --   Net 0 ml           Physical Exam:     Blood pressure 124/71, pulse 100, temperature 99.3 °F (37.4 °C), temperature source Oral, resp. rate 20, height 5' 2.4\" (1.585 m), weight 122 lb (55.3 kg), SpO2 99 %. General:    Well nourished, well developed in NAD. Head:  Normocephalic, atraumatic  Eyes:  Sclerae appear normal.  Pupils equally round. ENT:  Nares appear normal, no drainage. Moist oral mucosa  Neck:  No restricted ROM. Trachea midline   CV:   Mild tachycardia; no m/r/g. No jugular venous distension. Lungs:   CTAB. No wheezing, rhonchi, or rales. Symmetric expansion. Abdomen:   Improved BS x 4 without guarding, no tenderness on palpation  Extremities: No cyanosis or clubbing. No edema  Skin:     No rashes and normal coloration. Warm and dry. Neuro:  CN II-XII grossly intact. Sensation intact. A&Ox3  Psych:  Normal mood and affect.       I have personally reviewed labs and tests showing:  Recent Labs:  Recent Results (from the past 48 hour(s))   Basic Metabolic Panel w/ Reflex to MG    Collection Time: 08/04/22  4:10 AM   Result Value Ref Range    Sodium 138 136 - 145 mmol/L    Potassium 3.8 3.5 - 5.1 mmol/L    Chloride 107 98 - 107 mmol/L    CO2 20 (L) 21 - 32 mmol/L    Anion Gap 11 7 - 16 mmol/L    Glucose 44 (L) 65 - 100 mg/dL    BUN 15 8 - 23 MG/DL    Creatinine 0.50 (L) 0.6 - 1.0 MG/DL    GFR African American >60 >60 ml/min/1.73m2    GFR Non- >60 >60 ml/min/1.73m2    Calcium 7.7 (L) 8.3 - 10.4 MG/DL   CBC    Collection Time: 08/04/22  4:10 AM   Result Value Ref Range    WBC 8.0 4.3 - 11.1 K/uL    RBC 4.04 (L) 4.05 - 5.2 M/uL    Hemoglobin 12.3 11.7 - 15.4 g/dL    Hematocrit 37.8 35.8 - 46.3 %    MCV 93.6 79.6 - 97.8 FL    MCH 30.4 26.1 - 32.9 PG    MCHC 32.5 31.4 - 35.0 g/dL    RDW 12.6 11.9 - 14.6 %    Platelets 085 122 - 931 K/uL    MPV 9.3 (L) 9.4 - 12.3 FL    nRBC 0.00 0.0 - 0.2 K/uL   Phosphorus    Collection Time: 08/04/22  4:10 AM   Result Value Ref Range    Phosphorus 1.6 (L) 2.3 - 3.7 MG/DL   Hepatic Function Panel    Collection Time: 08/04/22  4:10 AM   Result Value Ref Range    Total Protein 5.5 (L) 6.3 - 8.2 g/dL    Albumin 2.4 (L) 3.2 - 4.6 g/dL    Globulin 3.1 2.3 - 3.5 g/dL    Albumin/Globulin Ratio 0.8 (L) 1.2 - 3.5      Total Bilirubin 0.6 0.2 - 1.1 MG/DL    Bilirubin, Direct 0.2 <0.4 MG/DL    Alk Phosphatase 52 50 - 136 U/L    AST 19 15 - 37 U/L    ALT 18 12 - 65 U/L   PLEASE READ & DOCUMENT PPD TEST IN 48 HRS    Collection Time: 08/04/22 10:27 PM   Result Value Ref Range    PPD, (POC) Negative Negative    mm Induration 0 0 - 5 mm   Basic Metabolic Panel w/ Reflex to MG    Collection Time: 08/05/22  5:11 AM   Result Value Ref Range    Sodium 138 136 - 145 mmol/L    Potassium 4.2 3.5 - 5.1 mmol/L    Chloride 108 (H) 98 - 107 mmol/L    CO2 21 21 - 32 mmol/L    Anion Gap 9 7 - 16 mmol/L    Glucose 40 (L) 65 - 100 mg/dL    BUN 10 8 - 23 MG/DL    Creatinine 0.40 (L) 0.6 - 1.0 MG/DL    GFR African American >60 >60 ml/min/1.73m2    GFR Non- >60 >60 ml/min/1.73m2    Calcium 7.6 (L) 8.3 - 10.4 MG/DL   Phosphorus    Collection Time: 08/05/22  5:11 AM   Result Value Ref Range    Phosphorus 1.3 (L) 2.3 - 3.7 MG/DL       I have personally reviewed imaging studies showing: Other Studies:  XR ABDOMEN (KUB) (SINGLE AP VIEW)   Final Result   1. Oral contrast progression to the distal colon, excluding high-grade   obstruction. 2.  Persistent dilated small bowel loops within the midabdomen. XR ABDOMEN (KUB) (SINGLE AP VIEW)   Final Result      RESIDUAL BARIUM IN THE PROXIMAL COLON FROM RECENT CT WOULD RENDER A SMALL BOWEL   SERIES SUBOPTIMAL IF NOT NONDIAGNOSTIC, AND THEREFORE IT IS DEFERRED PENDING A   COLON PREP. XR ABDOMEN (KUB) (SINGLE AP VIEW)   Final Result   NG tube tube tip is coiled once in the stomach with tip near the   midline. XR ABDOMEN (KUB) (SINGLE AP VIEW)   Final Result   FINDINGS / IMPRESSION:  Persistent dilated small bowel loops. There is some   contrast in the colon. NG tube is present. XR ABDOMEN (KUB) (SINGLE AP VIEW)   Final Result      1. Findings as described above. XR ABDOMEN (KUB) (SINGLE AP VIEW)   Final Result   Nasogastric tube proximal sidehole is at the gastroesophageal   junction. Advancement by approximately 10 cm is suggested. CT ABDOMEN PELVIS W IV CONTRAST Additional Contrast? Oral   Final Result   Small bowel obstruction with transition point in the right lower quadrant. XR ACUTE ABD SERIES CHEST 1 VW   Final Result   1. Persistent gaseous distention of small bowel.    2.  No acute findings in the chest.      FL SMALL BOWEL FOLLOW THROUGH ONLY    (Results Pending)   XR ABDOMEN (KUB) (SINGLE AP VIEW)    (Results Pending)       Current Meds:  Current Facility-Administered Medications   Medication Dose Route Frequency    polyethylene glycol (GLYCOLAX) packet 17 g  17 g Oral Daily    bisacodyl (DULCOLAX) suppository 10 mg  10 mg Rectal Daily    sodium phosphate 7.5 mmol in sodium chloride 0.9 % 250 mL IVPB  7.5 mmol IntraVENous PRN

## 2022-08-06 ENCOUNTER — APPOINTMENT (OUTPATIENT)
Dept: GENERAL RADIOLOGY | Age: 82
DRG: 389 | End: 2022-08-06
Payer: MEDICARE

## 2022-08-06 LAB
ANION GAP SERPL CALC-SCNC: 9 MMOL/L (ref 7–16)
BUN SERPL-MCNC: 8 MG/DL (ref 8–23)
CALCIUM SERPL-MCNC: 8 MG/DL (ref 8.3–10.4)
CHLORIDE SERPL-SCNC: 107 MMOL/L (ref 98–107)
CO2 SERPL-SCNC: 20 MMOL/L (ref 21–32)
CREAT SERPL-MCNC: 0.5 MG/DL (ref 0.6–1)
ERYTHROCYTE [DISTWIDTH] IN BLOOD BY AUTOMATED COUNT: 12.5 % (ref 11.9–14.6)
GLUCOSE BLD STRIP.AUTO-MCNC: 43 MG/DL (ref 65–100)
GLUCOSE BLD STRIP.AUTO-MCNC: 55 MG/DL (ref 65–100)
GLUCOSE BLD STRIP.AUTO-MCNC: 79 MG/DL (ref 65–100)
GLUCOSE BLD STRIP.AUTO-MCNC: 96 MG/DL (ref 65–100)
GLUCOSE SERPL-MCNC: 45 MG/DL (ref 65–100)
HCT VFR BLD AUTO: 37.3 % (ref 35.8–46.3)
HGB BLD-MCNC: 12.2 G/DL (ref 11.7–15.4)
MCH RBC QN AUTO: 30.3 PG (ref 26.1–32.9)
MCHC RBC AUTO-ENTMCNC: 32.7 G/DL (ref 31.4–35)
MCV RBC AUTO: 92.6 FL (ref 79.6–97.8)
NRBC # BLD: 0 K/UL (ref 0–0.2)
PHOSPHATE SERPL-MCNC: 2 MG/DL (ref 2.3–3.7)
PLATELET # BLD AUTO: 220 K/UL (ref 150–450)
PMV BLD AUTO: 8.7 FL (ref 9.4–12.3)
POTASSIUM SERPL-SCNC: 3.6 MMOL/L (ref 3.5–5.1)
RBC # BLD AUTO: 4.03 M/UL (ref 4.05–5.2)
SERVICE CMNT-IMP: ABNORMAL
SERVICE CMNT-IMP: ABNORMAL
SERVICE CMNT-IMP: NORMAL
SERVICE CMNT-IMP: NORMAL
SODIUM SERPL-SCNC: 136 MMOL/L (ref 136–145)
WBC # BLD AUTO: 8.9 K/UL (ref 4.3–11.1)

## 2022-08-06 PROCEDURE — 1100000000 HC RM PRIVATE

## 2022-08-06 PROCEDURE — 2580000003 HC RX 258: Performed by: INTERNAL MEDICINE

## 2022-08-06 PROCEDURE — 6360000002 HC RX W HCPCS: Performed by: INTERNAL MEDICINE

## 2022-08-06 PROCEDURE — 2580000003 HC RX 258: Performed by: PHYSICIAN ASSISTANT

## 2022-08-06 PROCEDURE — C9113 INJ PANTOPRAZOLE SODIUM, VIA: HCPCS | Performed by: INTERNAL MEDICINE

## 2022-08-06 PROCEDURE — 6370000000 HC RX 637 (ALT 250 FOR IP): Performed by: SURGERY

## 2022-08-06 PROCEDURE — 74018 RADEX ABDOMEN 1 VIEW: CPT

## 2022-08-06 PROCEDURE — 84100 ASSAY OF PHOSPHORUS: CPT

## 2022-08-06 PROCEDURE — 2500000003 HC RX 250 WO HCPCS: Performed by: PHYSICIAN ASSISTANT

## 2022-08-06 PROCEDURE — 80048 BASIC METABOLIC PNL TOTAL CA: CPT

## 2022-08-06 PROCEDURE — 82962 GLUCOSE BLOOD TEST: CPT

## 2022-08-06 PROCEDURE — 36415 COLL VENOUS BLD VENIPUNCTURE: CPT

## 2022-08-06 PROCEDURE — 85027 COMPLETE CBC AUTOMATED: CPT

## 2022-08-06 PROCEDURE — A4216 STERILE WATER/SALINE, 10 ML: HCPCS | Performed by: INTERNAL MEDICINE

## 2022-08-06 RX ORDER — DEXTROSE AND SODIUM CHLORIDE 5; .9 G/100ML; G/100ML
INJECTION, SOLUTION INTRAVENOUS CONTINUOUS
Status: DISCONTINUED | OUTPATIENT
Start: 2022-08-06 | End: 2022-08-08

## 2022-08-06 RX ADMIN — BISACODYL 10 MG: 10 SUPPOSITORY RECTAL at 09:56

## 2022-08-06 RX ADMIN — ENOXAPARIN SODIUM 40 MG: 100 INJECTION SUBCUTANEOUS at 09:55

## 2022-08-06 RX ADMIN — SODIUM CHLORIDE, PRESERVATIVE FREE 5 ML: 5 INJECTION INTRAVENOUS at 20:59

## 2022-08-06 RX ADMIN — SODIUM CHLORIDE 40 MG: 9 INJECTION INTRAMUSCULAR; INTRAVENOUS; SUBCUTANEOUS at 09:52

## 2022-08-06 RX ADMIN — SODIUM PHOSPHATE, MONOBASIC, MONOHYDRATE 15 MMOL: 276; 142 INJECTION, SOLUTION INTRAVENOUS at 06:46

## 2022-08-06 RX ADMIN — DEXTROSE AND SODIUM CHLORIDE: 5; 900 INJECTION, SOLUTION INTRAVENOUS at 09:23

## 2022-08-06 RX ADMIN — DEXTROSE AND SODIUM CHLORIDE: 5; 900 INJECTION, SOLUTION INTRAVENOUS at 22:09

## 2022-08-06 ASSESSMENT — PAIN SCALES - GENERAL
PAINLEVEL_OUTOF10: 1
PAINLEVEL_OUTOF10: 0

## 2022-08-06 NOTE — PROGRESS NOTES
Hospitalist Progress Note   Admit Date:  2022 11:25 AM   Name:  Radha Jaime   Age:  80 y.o. Sex:  female  :  1940   MRN:  [id number]   Room:  [facility]/    Presenting Complaint: Emesis     Reason(s) for Admission: Hypokalemia [E87.6]  Hyponatremia [E87.1]  Small bowel obstruction [facility] Course & Interval History:   Vinay Gonzalez (\"Laverle\") is a 79-year-old  female with history of tachycardia, cholecystectomy, total abdominal hysterectomy, & IBS w/ diarrhea who presented to the ED on  via EMS due to labs showing hyponatremia from Urgent Care w/ chief complaint of N/V described as bilious onset  & accompanying constipation. She had distention & a bloated feeling at presentation. She endorsed last BM  & denies passing flatus since that time, despite attempting a suppository prior to going to Urgent Care. She was seen in ED on  w/ chief complaint of increasing lower abdominal pain onset  w/ aforementioned N/V onset . She described pain as achy & sharp, moving from in her epigastric region to her lower abdomen. She took Zofran  w/o relief. She denied experiencing similar pain before. She had R lower abdominal TTP at that time. She was diagnosed with likely enteritis based on CT scan & d/c'd home w/ prescription for Zofran, as well as directions to f/u w/ PCP. Found in ED on  to have hypokalemia, hyponatremia, & SBO w/ transition point in the RLQ based on CTAP  & XR KUB  supporting bowel distention. NG tube to LWIS placed. Admitted to hospital for SBO. Patient transferred to 7th floor from ED around 6:30 PM.     Reports Ih/o BS w/ diarrhea for \"many years\": States that has taken cholestyramine light most mornings for these years for diarrhea, but then not taken it days when feels constipated.  States that usually just one day of constipation and no cholestyramine light and then diarrhea is back again so starts taking. Reports last colonoscopy was 4 years ago, during which polyps were removed, but denies any notification of concerning findings by PCP at [facility]. Colonoscopy also 05/18/10    Reports family history: mother had colorectal cancer found on routine colonoscopy & treated successfully with colon resection & 1 medication w/o return. Father had \"really bad\" diabetes & heart attack in his 66's. Subjective/24hr Events (08/06/22): \"I tooted again today and I had some stool when I sat on the commode. \"  Pleasant 81y.o. WF sitting up in bed without complaints    Admits to flatulence and small BM again today; denies dizziness, syncope, confusion, N/V, abd pain, chest, pain, dyspnea    Assessment & Plan:     Principal Problem:        Small bowel obstruction  - continues to admit to small BM and flatulence  - appreciate surgery ongoing assistance  - KUB today with interval improvement  - cont bowel rest per surgery  - SBFT still pending  - cont supportive care     Active Problems:       Hypoglycemia  - pt asymptomatic  - attributed to NPO  - started D5 today; fingersticks q4hrs      Hypophosphatemia  - improved; continue supplementation; 1.3 ~> 2.0 this AM      Hypokalemia  - attributed to GI loss, resolved      Rule out UTI  - admission UA with large blood, dark, small leuks, 2+ bactereia, nitrite negative  - urine culture GPC pending  - pt remains afebrile without symptoms; will NOT start empiric abx until bacterial process found       Chronic mild sinus tachycardia  - consider starting oral BB once SBO resolves; closely monitor; sees cards outpatient       OAB (overactive bladder)  - aware; oral meds held given SBO       Discharge Planning:    - pending clinical course    Diet:  Diet NPO Exceptions are: Ice Chips  DVT PPx: Lovenox  Code status: Full Code    Hospital Problems:  Principal Problem:    Small bowel obstruction (HCC)  Active Problems:    OAB (overactive bladder)    Acute hypokalemia Hyponatremia    Prerenal azotemia    Nausea and vomiting    Sinus tachycardia    Abnormal CT of the abdomen  Resolved Problems:    * No resolved hospital problems. *      Objective:   Patient Vitals for the past 24 hrs:   Temp Pulse Resp BP SpO2   08/06/22 1518 98.1 °F (36.7 °C) 98 22 134/62 99 %   08/06/22 1106 98.1 °F (36.7 °C) (!) 108 20 137/62 99 %   08/06/22 0743 98.1 °F (36.7 °C) 96 20 (!) 129/59 99 %   08/06/22 0413 98.8 °F (37.1 °C) 92 18 131/63 98 %   08/05/22 2313 98.2 °F (36.8 °C) 96 16 (!) 112/53 97 %   08/05/22 1936 98.8 °F (37.1 °C) 100 16 125/64 100 %         Oxygen Therapy  SpO2: 99 %  Pulse via Oximetry: 101 beats per minute  Pulse Oximeter Device Mode: Continuous  O2 Device: None (Room air)    Estimated body mass index is 22.03 kg/m² as calculated from the following:    Height as of this encounter: 5' 2.4\" (1.585 m). Weight as of this encounter: 122 lb (55.3 kg). Intake/Output Summary (Last 24 hours) at 8/6/2022 1651  Last data filed at 8/6/2022 1237  Gross per 24 hour   Intake 0 ml   Output --   Net 0 ml           Physical Exam:     Blood pressure 134/62, pulse 98, temperature 98.1 °F (36.7 °C), temperature source Oral, resp. rate 22, height 5' 2.4\" (1.585 m), weight 122 lb (55.3 kg), SpO2 99 %. General:    Well nourished, well developed in NAD. Head:  Normocephalic, atraumatic  Eyes:  Sclerae appear normal.  Pupils equally round. ENT:  Nares appear normal, no drainage. Moist oral mucosa  Neck:  No restricted ROM. Trachea midline   CV:   Mild tachycardia; no m/r/g. No jugular venous distension. Lungs:   CTAB. No wheezing, rhonchi, or rales. Symmetric expansion. Abdomen:   Improved BS x 4 though still hypoactive; no guarding, no tenderness on palpation  Extremities: No cyanosis or clubbing. No edema  Skin:     No rashes and normal coloration. Warm and dry. Neuro:  CN II-XII grossly intact. Sensation intact. A&Ox3  Psych:  Normal mood and affect.       I have personally reviewed labs and tests showing:  Recent Labs:  Recent Results (from the past 48 hour(s))   PLEASE READ & DOCUMENT PPD TEST IN 48 HRS    Collection Time: 08/04/22 10:27 PM   Result Value Ref Range    PPD, (POC) Negative Negative    mm Induration 0 0 - 5 mm   Basic Metabolic Panel w/ Reflex to MG    Collection Time: 08/05/22  5:11 AM   Result Value Ref Range    Sodium 138 136 - 145 mmol/L    Potassium 4.2 3.5 - 5.1 mmol/L    Chloride 108 (H) 98 - 107 mmol/L    CO2 21 21 - 32 mmol/L    Anion Gap 9 7 - 16 mmol/L    Glucose 40 (L) 65 - 100 mg/dL    BUN 10 8 - 23 MG/DL    Creatinine 0.40 (L) 0.6 - 1.0 MG/DL    GFR African American >60 >60 ml/min/1.73m2    GFR Non- >60 >60 ml/min/1.73m2    Calcium 7.6 (L) 8.3 - 10.4 MG/DL   Phosphorus    Collection Time: 08/05/22  5:11 AM   Result Value Ref Range    Phosphorus 1.3 (L) 2.3 - 3.7 MG/DL   Culture, Urine    Collection Time: 08/05/22  2:59 PM    Specimen: URINE-CLEAN CATCH   Result Value Ref Range    Special Requests NO SPECIAL REQUESTS      Culture (A)       10,000 to 50,000 COLONIES/mL GRAM POSITIVE COCCI IDENTIFICATION TO FOLLOW    Culture <10,000 COLONIES/mL NORMAL SKIN FER ISOLATED     Basic Metabolic Panel w/ Reflex to MG    Collection Time: 08/06/22  5:11 AM   Result Value Ref Range    Sodium 136 136 - 145 mmol/L    Potassium 3.6 3.5 - 5.1 mmol/L    Chloride 107 98 - 107 mmol/L    CO2 20 (L) 21 - 32 mmol/L    Anion Gap 9 7 - 16 mmol/L    Glucose 45 (L) 65 - 100 mg/dL    BUN 8 8 - 23 MG/DL    Creatinine 0.50 (L) 0.6 - 1.0 MG/DL    GFR African American >60 >60 ml/min/1.73m2    GFR Non- >60 >60 ml/min/1.73m2    Calcium 8.0 (L) 8.3 - 10.4 MG/DL   CBC    Collection Time: 08/06/22  5:11 AM   Result Value Ref Range    WBC 8.9 4.3 - 11.1 K/uL    RBC 4.03 (L) 4.05 - 5.2 M/uL    Hemoglobin 12.2 11.7 - 15.4 g/dL    Hematocrit 37.3 35.8 - 46.3 %    MCV 92.6 79.6 - 97.8 FL    MCH 30.3 26.1 - 32.9 PG    MCHC 32.7 31.4 - 35.0 g/dL    RDW 12.5 11.9 - 14.6 %    Platelets 337 967 - 397 K/uL    MPV 8.7 (L) 9.4 - 12.3 FL    nRBC 0.00 0.0 - 0.2 K/uL   Phosphorus    Collection Time: 08/06/22  5:11 AM   Result Value Ref Range    Phosphorus 2.0 (L) 2.3 - 3.7 MG/DL   POCT Glucose    Collection Time: 08/06/22  8:24 AM   Result Value Ref Range    POC Glucose 43 (L) 65 - 100 mg/dL    Performed by: AmatoRNFrank    POCT Glucose    Collection Time: 08/06/22 10:03 AM   Result Value Ref Range    POC Glucose 55 (L) 65 - 100 mg/dL    Performed by: AmatoRNFrank    POCT Glucose    Collection Time: 08/06/22 11:10 AM   Result Value Ref Range    POC Glucose 79 65 - 100 mg/dL    Performed by: Mandie    POCT Glucose    Collection Time: 08/06/22  3:20 PM   Result Value Ref Range    POC Glucose 96 65 - 100 mg/dL    Performed by: Mandie        I have personally reviewed imaging studies showing: Other Studies:  XR ABDOMEN (KUB) (SINGLE AP VIEW)   Final Result   Slightly improved small bowel distention. XR ABDOMEN (KUB) (SINGLE AP VIEW)   Final Result   1. Oral contrast progression to the distal colon, excluding high-grade   obstruction. 2.  Persistent dilated small bowel loops within the midabdomen. XR ABDOMEN (KUB) (SINGLE AP VIEW)   Final Result      RESIDUAL BARIUM IN THE PROXIMAL COLON FROM RECENT CT WOULD RENDER A SMALL BOWEL   SERIES SUBOPTIMAL IF NOT NONDIAGNOSTIC, AND THEREFORE IT IS DEFERRED PENDING A   COLON PREP. XR ABDOMEN (KUB) (SINGLE AP VIEW)   Final Result   NG tube tube tip is coiled once in the stomach with tip near the   midline. XR ABDOMEN (KUB) (SINGLE AP VIEW)   Final Result   FINDINGS / IMPRESSION:  Persistent dilated small bowel loops. There is some   contrast in the colon. NG tube is present. XR ABDOMEN (KUB) (SINGLE AP VIEW)   Final Result      1. Findings as described above.          XR ABDOMEN (KUB) (SINGLE AP VIEW)   Final Result   Nasogastric tube proximal sidehole is at the Signed:  Kristopher Julian,

## 2022-08-06 NOTE — PROGRESS NOTES
Admit Date: 2022    POD * No surgery found *    Procedure:  * No surgery found *    Subjective:     Pt awake in bed. No issues overnight. Continues to report a small amount of flatus and two small BM's. NG tube in place, no documented output and 150mL of dark brown liquid in canister. Denies belching, nausea, and vomiting. AF, NAD.     KUB today showing  Impression   Slightly improved small bowel distention. Objective:       Vitals:    22 0413 22 0743 22 1106 22 1518   BP: 131/63 (!) 129/59 137/62 134/62   Pulse: 92 96 (!) 108 98   Resp:    Temp: 98.8 °F (37.1 °C) 98.1 °F (36.7 °C) 98.1 °F (36.7 °C) 98.1 °F (36.7 °C)   TempSrc: Oral Oral Oral Oral   SpO2: 98% 99% 99% 99%   Weight:       Height:           Temp (24hrs), Av.4 °F (36.9 °C), Min:98.1 °F (36.7 °C), Max:98.8 °F (37.1 °C)    Intake/Output Summary (Last 24 hours) at 2022 1554  Last data filed at 2022 1237  Gross per 24 hour   Intake 0 ml   Output --   Net 0 ml             Physical Exam:   Physical Exam  HENT:      Mouth/Throat:      Mouth: Mucous membranes are moist.   Cardiovascular:      Rate and Rhythm: Normal rate. Heart sounds: Normal heart sounds. Comments: Slightly tachy  Pulmonary:      Effort: Pulmonary effort is normal.      Breath sounds: Normal breath sounds. Abdominal:      Palpations: Abdomen is soft. Comments: Slightly distended. BS active. Non-tender   Neurological:      Mental Status: She is alert.          Labs:   Recent Results (from the past 24 hour(s))   Basic Metabolic Panel w/ Reflex to MG    Collection Time: 22  5:11 AM   Result Value Ref Range    Sodium 136 136 - 145 mmol/L    Potassium 3.6 3.5 - 5.1 mmol/L    Chloride 107 98 - 107 mmol/L    CO2 20 (L) 21 - 32 mmol/L    Anion Gap 9 7 - 16 mmol/L    Glucose 45 (L) 65 - 100 mg/dL    BUN 8 8 - 23 MG/DL    Creatinine 0.50 (L) 0.6 - 1.0 MG/DL    GFR African American >60 >60 ml/min/1.73m2    GFR Non-African American >60 >60 ml/min/1.73m2    Calcium 8.0 (L) 8.3 - 10.4 MG/DL   CBC    Collection Time: 08/06/22  5:11 AM   Result Value Ref Range    WBC 8.9 4.3 - 11.1 K/uL    RBC 4.03 (L) 4.05 - 5.2 M/uL    Hemoglobin 12.2 11.7 - 15.4 g/dL    Hematocrit 37.3 35.8 - 46.3 %    MCV 92.6 79.6 - 97.8 FL    MCH 30.3 26.1 - 32.9 PG    MCHC 32.7 31.4 - 35.0 g/dL    RDW 12.5 11.9 - 14.6 %    Platelets 807 519 - 154 K/uL    MPV 8.7 (L) 9.4 - 12.3 FL    nRBC 0.00 0.0 - 0.2 K/uL   Phosphorus    Collection Time: 08/06/22  5:11 AM   Result Value Ref Range    Phosphorus 2.0 (L) 2.3 - 3.7 MG/DL   POCT Glucose    Collection Time: 08/06/22  8:24 AM   Result Value Ref Range    POC Glucose 43 (L) 65 - 100 mg/dL    Performed by: Crayon DataatoRDoubles Alleyrank    POCT Glucose    Collection Time: 08/06/22 10:03 AM   Result Value Ref Range    POC Glucose 55 (L) 65 - 100 mg/dL    Performed by: Crayon DataatoRDoubles Alleyrank    POCT Glucose    Collection Time: 08/06/22 11:10 AM   Result Value Ref Range    POC Glucose 79 65 - 100 mg/dL    Performed by: Mandie    POCT Glucose    Collection Time: 08/06/22  3:20 PM   Result Value Ref Range    POC Glucose 96 65 - 100 mg/dL    Performed by: RamirezPCAAngela        Data Review as above    XR Results (most recent):  XR ABDOMEN (KUB) (SINGLE AP VIEW)  Narrative: EXAM: Abdomen x-ray. INDICATION: Bowel obstruction. COMPARISON: Yesterday's study. TECHNIQUE: A single supine view of the abdomen was obtained. FINDINGS: Previous dilated small bowel loops have slightly improved. The colon  gas pattern remains normal, with no significant change in the oral contrast  within the colon. No gross free air is seen. The enteric tube tip lies in the  mid stomach. There are cholecystectomy clips. Impression: Slightly improved small bowel distention.       Assessment:     Principal Problem:    Small bowel obstruction (HCC)  Active Problems:    OAB (overactive bladder)    Acute hypokalemia    Hyponatremia    Prerenal azotemia    Nausea and vomiting    Sinus tachycardia    Abnormal CT of the abdomen  Resolved Problems:    * No resolved hospital problems. *    SBO     Plan/Recommendations/Medical Decision Making:   Care Management per Hospitalist  Continue non surgical management with bowel rest and IVF  Cont NPO, NGT to LWIS  Phos 2.0- replaced per hospitalist  Cr . 50  Miralax  SBFT ordered      Kirby Polk NP

## 2022-08-07 ENCOUNTER — APPOINTMENT (OUTPATIENT)
Dept: GENERAL RADIOLOGY | Age: 82
DRG: 389 | End: 2022-08-07
Payer: MEDICARE

## 2022-08-07 LAB
ANION GAP SERPL CALC-SCNC: 6 MMOL/L (ref 7–16)
BACTERIA SPEC CULT: ABNORMAL
BACTERIA SPEC CULT: ABNORMAL
BUN SERPL-MCNC: 3 MG/DL (ref 8–23)
CALCIUM SERPL-MCNC: 8.2 MG/DL (ref 8.3–10.4)
CHLORIDE SERPL-SCNC: 106 MMOL/L (ref 98–107)
CO2 SERPL-SCNC: 25 MMOL/L (ref 21–32)
CREAT SERPL-MCNC: 0.4 MG/DL (ref 0.6–1)
ERYTHROCYTE [DISTWIDTH] IN BLOOD BY AUTOMATED COUNT: 12.5 % (ref 11.9–14.6)
GLUCOSE BLD STRIP.AUTO-MCNC: 100 MG/DL (ref 65–100)
GLUCOSE BLD STRIP.AUTO-MCNC: 122 MG/DL (ref 65–100)
GLUCOSE BLD STRIP.AUTO-MCNC: 141 MG/DL (ref 65–100)
GLUCOSE SERPL-MCNC: 125 MG/DL (ref 65–100)
HCT VFR BLD AUTO: 37.4 % (ref 35.8–46.3)
HGB BLD-MCNC: 12.3 G/DL (ref 11.7–15.4)
MAGNESIUM SERPL-MCNC: 1.8 MG/DL (ref 1.8–2.4)
MAGNESIUM SERPL-MCNC: 1.9 MG/DL (ref 1.8–2.4)
MCH RBC QN AUTO: 29.8 PG (ref 26.1–32.9)
MCHC RBC AUTO-ENTMCNC: 32.9 G/DL (ref 31.4–35)
MCV RBC AUTO: 90.6 FL (ref 79.6–97.8)
NRBC # BLD: 0 K/UL (ref 0–0.2)
PHOSPHATE SERPL-MCNC: 2 MG/DL (ref 2.3–3.7)
PLATELET # BLD AUTO: 228 K/UL (ref 150–450)
PMV BLD AUTO: 9 FL (ref 9.4–12.3)
POTASSIUM SERPL-SCNC: 2.9 MMOL/L (ref 3.5–5.1)
RBC # BLD AUTO: 4.13 M/UL (ref 4.05–5.2)
SERVICE CMNT-IMP: ABNORMAL
SERVICE CMNT-IMP: NORMAL
SODIUM SERPL-SCNC: 137 MMOL/L (ref 136–145)
WBC # BLD AUTO: 9.3 K/UL (ref 4.3–11.1)

## 2022-08-07 PROCEDURE — 6360000002 HC RX W HCPCS: Performed by: INTERNAL MEDICINE

## 2022-08-07 PROCEDURE — 6360000002 HC RX W HCPCS: Performed by: PHYSICIAN ASSISTANT

## 2022-08-07 PROCEDURE — 6370000000 HC RX 637 (ALT 250 FOR IP): Performed by: SURGERY

## 2022-08-07 PROCEDURE — 2580000003 HC RX 258: Performed by: INTERNAL MEDICINE

## 2022-08-07 PROCEDURE — A4216 STERILE WATER/SALINE, 10 ML: HCPCS | Performed by: INTERNAL MEDICINE

## 2022-08-07 PROCEDURE — 83735 ASSAY OF MAGNESIUM: CPT

## 2022-08-07 PROCEDURE — 6370000000 HC RX 637 (ALT 250 FOR IP): Performed by: PHYSICIAN ASSISTANT

## 2022-08-07 PROCEDURE — 85027 COMPLETE CBC AUTOMATED: CPT

## 2022-08-07 PROCEDURE — 2580000003 HC RX 258: Performed by: PHYSICIAN ASSISTANT

## 2022-08-07 PROCEDURE — C9113 INJ PANTOPRAZOLE SODIUM, VIA: HCPCS | Performed by: INTERNAL MEDICINE

## 2022-08-07 PROCEDURE — 1100000000 HC RM PRIVATE

## 2022-08-07 PROCEDURE — 74018 RADEX ABDOMEN 1 VIEW: CPT

## 2022-08-07 PROCEDURE — 36415 COLL VENOUS BLD VENIPUNCTURE: CPT

## 2022-08-07 PROCEDURE — 80048 BASIC METABOLIC PNL TOTAL CA: CPT

## 2022-08-07 PROCEDURE — 84100 ASSAY OF PHOSPHORUS: CPT

## 2022-08-07 RX ORDER — POTASSIUM CHLORIDE 7.45 MG/ML
20 INJECTION INTRAVENOUS
Status: COMPLETED | OUTPATIENT
Start: 2022-08-07 | End: 2022-08-08

## 2022-08-07 RX ORDER — LIDOCAINE HYDROCHLORIDE 20 MG/ML
15 SOLUTION OROPHARYNGEAL
Status: DISCONTINUED | OUTPATIENT
Start: 2022-08-07 | End: 2022-08-09 | Stop reason: HOSPADM

## 2022-08-07 RX ADMIN — BISACODYL 10 MG: 10 SUPPOSITORY RECTAL at 08:55

## 2022-08-07 RX ADMIN — SODIUM CHLORIDE, PRESERVATIVE FREE 5 ML: 5 INJECTION INTRAVENOUS at 08:54

## 2022-08-07 RX ADMIN — ENOXAPARIN SODIUM 40 MG: 100 INJECTION SUBCUTANEOUS at 08:55

## 2022-08-07 RX ADMIN — SODIUM CHLORIDE, PRESERVATIVE FREE 10 ML: 5 INJECTION INTRAVENOUS at 21:52

## 2022-08-07 RX ADMIN — POTASSIUM CHLORIDE 20 MEQ: 7.46 INJECTION, SOLUTION INTRAVENOUS at 13:09

## 2022-08-07 RX ADMIN — PHENOL 1 SPRAY: 1.5 LIQUID ORAL at 17:59

## 2022-08-07 RX ADMIN — POTASSIUM CHLORIDE 20 MEQ: 7.46 INJECTION, SOLUTION INTRAVENOUS at 08:53

## 2022-08-07 RX ADMIN — DEXTROSE AND SODIUM CHLORIDE: 5; 900 INJECTION, SOLUTION INTRAVENOUS at 17:48

## 2022-08-07 RX ADMIN — SODIUM CHLORIDE 40 MG: 9 INJECTION INTRAMUSCULAR; INTRAVENOUS; SUBCUTANEOUS at 08:55

## 2022-08-07 RX ADMIN — AZITHROMYCIN MONOHYDRATE 500 MG: 500 INJECTION, POWDER, LYOPHILIZED, FOR SOLUTION INTRAVENOUS at 17:47

## 2022-08-07 ASSESSMENT — PAIN SCALES - GENERAL
PAINLEVEL_OUTOF10: 2
PAINLEVEL_OUTOF10: 0

## 2022-08-07 NOTE — PROGRESS NOTES
Admit Date: 2022    POD * No surgery found *    Procedure:  * No surgery found *    Subjective:     Pt awake in bed. No issues overnight. Continues to report a small amount of flatus and small BM's. NG tube in place, with approximately 150mL of dark brown liquid out overnight. Denies belching, nausea, and vomiting. AF, NAD. K+ 2.9. Phosphorus 2.0    22 KUB today showing  Impression       1. Slight interval progression in small bowel obstruction. CPT code(s) 26872                             Objective:       Vitals:    22 2300 22 0318 22 0756 22 1129   BP: 136/63 120/74 127/69 130/60   Pulse: (!) 103 (!) 118 (!) 106 (!) 108   Resp: 16 18 16 17   Temp: 98.1 °F (36.7 °C) 98 °F (36.7 °C) 98.4 °F (36.9 °C) 98.2 °F (36.8 °C)   TempSrc: Oral Oral Oral Oral   SpO2: 97% 95% 97% 99%   Weight:       Height:           Temp (24hrs), Av.2 °F (36.8 °C), Min:98 °F (36.7 °C), Max:98.4 °F (36.9 °C)    Intake/Output Summary (Last 24 hours) at 2022 1401  Last data filed at 2022 1709  Gross per 24 hour   Intake 0 ml   Output --   Net 0 ml             Physical Exam:   Physical Exam  HENT:      Nose:      Comments: NG to LIS     Mouth/Throat:      Mouth: Mucous membranes are moist.   Cardiovascular:      Rate and Rhythm: Normal rate. Heart sounds: Normal heart sounds. Comments: Slightly tachy  Pulmonary:      Effort: Pulmonary effort is normal.      Breath sounds: Normal breath sounds. Abdominal:      Palpations: Abdomen is soft. Comments: Non distended. BS active. Non-tender   Neurological:      Mental Status: She is alert. Labs:   Recent Results (from the past 24 hour(s))   POCT Glucose    Collection Time: 22  3:20 PM   Result Value Ref Range    POC Glucose 96 65 - 100 mg/dL    Performed by: Mandie    POCT Glucose    Collection Time: 08/06/22 11:54 PM   Result Value Ref Range    POC Glucose 122 (H) 65 - 100 mg/dL    Performed by:  Therese Basic Metabolic Panel w/ Reflex to MG    Collection Time: 08/07/22  5:07 AM   Result Value Ref Range    Sodium 137 136 - 145 mmol/L    Potassium 2.9 (L) 3.5 - 5.1 mmol/L    Chloride 106 98 - 107 mmol/L    CO2 25 21 - 32 mmol/L    Anion Gap 6 (L) 7 - 16 mmol/L    Glucose 125 (H) 65 - 100 mg/dL    BUN 3 (L) 8 - 23 MG/DL    Creatinine 0.40 (L) 0.6 - 1.0 MG/DL    GFR African American >60 >60 ml/min/1.73m2    GFR Non- >60 >60 ml/min/1.73m2    Calcium 8.2 (L) 8.3 - 10.4 MG/DL   CBC    Collection Time: 08/07/22  5:07 AM   Result Value Ref Range    WBC 9.3 4.3 - 11.1 K/uL    RBC 4.13 4.05 - 5.2 M/uL    Hemoglobin 12.3 11.7 - 15.4 g/dL    Hematocrit 37.4 35.8 - 46.3 %    MCV 90.6 79.6 - 97.8 FL    MCH 29.8 26.1 - 32.9 PG    MCHC 32.9 31.4 - 35.0 g/dL    RDW 12.5 11.9 - 14.6 %    Platelets 156 274 - 392 K/uL    MPV 9.0 (L) 9.4 - 12.3 FL    nRBC 0.00 0.0 - 0.2 K/uL   Phosphorus    Collection Time: 08/07/22  5:07 AM   Result Value Ref Range    Phosphorus 2.0 (L) 2.3 - 3.7 MG/DL   Magnesium    Collection Time: 08/07/22  5:07 AM   Result Value Ref Range    Magnesium 1.9 1.8 - 2.4 mg/dL   Magnesium    Collection Time: 08/07/22  5:07 AM   Result Value Ref Range    Magnesium 1.8 1.8 - 2.4 mg/dL   POCT Glucose    Collection Time: 08/07/22  6:00 AM   Result Value Ref Range    POC Glucose 141 (H) 65 - 100 mg/dL    Performed by: Latisha    POCT Glucose    Collection Time: 08/07/22 11:31 AM   Result Value Ref Range    POC Glucose 100 65 - 100 mg/dL    Performed by: [email or web address] [email or web address]        Data Review as above    XR Results (most recent):  XR ABDOMEN (KUB) (SINGLE AP VIEW)  Narrative: Exam: XR ABDOMEN LIMITED (KUB) on 8/7/2022 8:50 AM    Clinical History: The female patient is 80years old  presenting for small bowel  obstruction.     Comparison:  Abdomen films 8/6/2022    Findings:      Single view of the abdomen demonstrates slightly increasing mid abdominal small  bowel gaseous distention with relative

## 2022-08-07 NOTE — PROGRESS NOTES
Hospitalist Progress Note   Admit Date:  2022 11:25 AM   Name:  Sandra Leal   Age:  80 y.o. Sex:  female  :  1940   MRN:  [id number]   Room:  [facility]/    Presenting Complaint: Emesis     Reason(s) for Admission: Hypokalemia [E87.6]  Hyponatremia [E87.1]  Small bowel obstruction [facility] Course & Interval History:   Vinay Andrew (\"Laverle\") is a 81-year-old  female with history of tachycardia, cholecystectomy, total abdominal hysterectomy, & IBS w/ diarrhea who presented to the ED on  via EMS due to labs showing hyponatremia from Urgent Care w/ chief complaint of N/V described as bilious onset  & accompanying constipation. She had distention & a bloated feeling at presentation. She endorsed last BM  & denies passing flatus since that time, despite attempting a suppository prior to going to Urgent Care. She was seen in ED on  w/ chief complaint of increasing lower abdominal pain onset  w/ aforementioned N/V onset . She described pain as achy & sharp, moving from in her epigastric region to her lower abdomen. She took Zofran  w/o relief. She denied experiencing similar pain before. She had R lower abdominal TTP at that time. She was diagnosed with likely enteritis based on CT scan & d/c'd home w/ prescription for Zofran, as well as directions to f/u w/ PCP. Found in ED on  to have hypokalemia, hyponatremia, & SBO w/ transition point in the RLQ based on CTAP  & XR KUB  supporting bowel distention. NG tube to LWIS placed. Admitted to hospital for SBO. Patient transferred to 7th floor from ED around 6:30 PM.     Reports Ih/o BS w/ diarrhea for \"many years\": States that has taken cholestyramine light most mornings for 20 years for diarrhea, but then not taken it days when feels constipated.  States that usually just one day of constipation and no cholestyramine light and then diarrhea is back again so starts taking. Reports last colonoscopy was 4 years ago, during which polyps were removed, but denies any notification of concerning findings by PCP at [facility]. Colonoscopy also 05/18/10    Reports family history: mother had colorectal cancer found on routine colonoscopy & treated successfully with colon resection & 1 medication w/o return. Father had \"really bad\" diabetes & heart attack in his 66's. Subjective/24hr Events (08/07/22):   \"I've been passing a bit of gas but I haven't had a BM. \"  Pleasant 81y.o. WF sitting up in bed  c/o sore throat from NGT    C/o sore throat; denies abd pain, cough, fever, chills, N/V, chest pain    Assessment & Plan:     Principal Problem:    Small bowel obstruction  - KUB this AM with persistent / progressed SBO  - cont supportive care / IVF  - surgery actively following, with thanks     Active Problems:    Hypoglycemia  - improved with D5; pt asymptomatic  - attributed to NPO      Hypokalemia  - suspect K+ shift with newly started D5  - mag wnl; no GI loss  - supplementation ordered  - f/u AM BMP      Hypophosphatemia  - improved; continue supplementation; 2.0 this AM      Rule out UTI  - admission UA with large blood, dark, small leuks, 2+ bactereia, nitrite negative  - urine culture resulted with skin radhika  - pt remains afebrile, no leukocytosis; not on abx, monitor       Chronic mild sinus tachycardia  - consider starting oral BB once SBO resolves; closely monitor; sees cards outpatient  - HR ranges 90-110s without symptoms  - monitor       Discharge Planning:    - pending clinical course      Diet:  Diet NPO Exceptions are: Ice Chips  DVT PPx: Lovenox  Code status: Full Code    Hospital Problems:  Principal Problem:    Small bowel obstruction (HCC)  Active Problems:    OAB (overactive bladder)    Acute hypokalemia    Hyponatremia    Prerenal azotemia    Nausea and vomiting    Sinus tachycardia    Abnormal CT of the abdomen  Resolved Problems:    * No resolved hospital problems. *      Objective:   Patient Vitals for the past 24 hrs:   Temp Pulse Resp BP SpO2   08/07/22 1129 98.2 °F (36.8 °C) (!) 108 17 130/60 99 %   08/07/22 0756 98.4 °F (36.9 °C) (!) 106 16 127/69 97 %   08/07/22 0318 98 °F (36.7 °C) (!) 118 18 120/74 95 %   08/06/22 2300 98.1 °F (36.7 °C) (!) 103 16 136/63 97 %   08/06/22 1930 98.4 °F (36.9 °C) (!) 101 20 130/60 99 %   08/06/22 1518 98.1 °F (36.7 °C) 98 22 134/62 99 %         Oxygen Therapy  SpO2: 99 %  Pulse via Oximetry: 101 beats per minute  Pulse Oximeter Device Mode: Continuous  O2 Device: None (Room air)    Estimated body mass index is 22.03 kg/m² as calculated from the following:    Height as of this encounter: 5' 2.4\" (1.585 m). Weight as of this encounter: 122 lb (55.3 kg). Intake/Output Summary (Last 24 hours) at 8/7/2022 1202  Last data filed at 8/6/2022 1709  Gross per 24 hour   Intake 0 ml   Output --   Net 0 ml           Physical Exam:     Blood pressure 130/60, pulse (!) 108, temperature 98.2 °F (36.8 °C), temperature source Oral, resp. rate 17, height 5' 2.4\" (1.585 m), weight 122 lb (55.3 kg), SpO2 99 %. General:    Well developed in NAD. Head:  Normocephalic, atraumatic  Eyes:  Sclerae appear normal.  Pupils equally round. ENT:  Nares appear normal, no drainage. Moist oral mucosa  Neck:  No restricted ROM. Trachea midline   CV:   Mild tachycardia; no m/r/g. No jugular venous distension. Lungs:   CTAB. No wheezing, rhonchi, or rales. Symmetric expansion. Abdomen:   Hypoactive active BS LUQ; absent BS right abdominal field; no guarding, nontender  Extremities: No cyanosis or clubbing. No edema  Skin:     No rashes and normal coloration. Warm and dry. Neuro:  CN II-XII grossly intact. Sensation intact. A&Ox3  Psych:  Normal mood and affect.       I have personally reviewed labs and tests showing:  Recent Labs:  Recent Results (from the past 48 hour(s))   Culture, Urine    Collection Time: 08/05/22  2:59 PM    Specimen: URINE-CLEAN CATCH   Result Value Ref Range    Special Requests NO SPECIAL REQUESTS      Culture (A)       10,000 to 50,000 COLONIES/mL GRAM POSITIVE COCCI IDENTIFICATION TO FOLLOW    Culture <10,000 COLONIES/mL NORMAL SKIN FER ISOLATED     PLEASE READ & DOCUMENT PPD TEST IN 72 HRS    Collection Time: 08/05/22 10:00 PM   Result Value Ref Range    PPD, (POC) Negative Negative    mm Induration 0 0 - 5 mm   Basic Metabolic Panel w/ Reflex to MG    Collection Time: 08/06/22  5:11 AM   Result Value Ref Range    Sodium 136 136 - 145 mmol/L    Potassium 3.6 3.5 - 5.1 mmol/L    Chloride 107 98 - 107 mmol/L    CO2 20 (L) 21 - 32 mmol/L    Anion Gap 9 7 - 16 mmol/L    Glucose 45 (L) 65 - 100 mg/dL    BUN 8 8 - 23 MG/DL    Creatinine 0.50 (L) 0.6 - 1.0 MG/DL    GFR African American >60 >60 ml/min/1.73m2    GFR Non- >60 >60 ml/min/1.73m2    Calcium 8.0 (L) 8.3 - 10.4 MG/DL   CBC    Collection Time: 08/06/22  5:11 AM   Result Value Ref Range    WBC 8.9 4.3 - 11.1 K/uL    RBC 4.03 (L) 4.05 - 5.2 M/uL    Hemoglobin 12.2 11.7 - 15.4 g/dL    Hematocrit 37.3 35.8 - 46.3 %    MCV 92.6 79.6 - 97.8 FL    MCH 30.3 26.1 - 32.9 PG    MCHC 32.7 31.4 - 35.0 g/dL    RDW 12.5 11.9 - 14.6 %    Platelets 300 312 - 808 K/uL    MPV 8.7 (L) 9.4 - 12.3 FL    nRBC 0.00 0.0 - 0.2 K/uL   Phosphorus    Collection Time: 08/06/22  5:11 AM   Result Value Ref Range    Phosphorus 2.0 (L) 2.3 - 3.7 MG/DL   POCT Glucose    Collection Time: 08/06/22  8:24 AM   Result Value Ref Range    POC Glucose 43 (L) 65 - 100 mg/dL    Performed by: Jaycob    POCT Glucose    Collection Time: 08/06/22 10:03 AM   Result Value Ref Range    POC Glucose 55 (L) 65 - 100 mg/dL    Performed by: Jaycob    POCT Glucose    Collection Time: 08/06/22 11:10 AM   Result Value Ref Range    POC Glucose 79 65 - 100 mg/dL    Performed by: Mandie    POCT Glucose    Collection Time: 08/06/22  3:20 PM   Result Value Ref Range    POC Glucose 96 65 - 100 mg/dL (SINGLE AP VIEW)   Final Result   Slightly improved small bowel distention. XR ABDOMEN (KUB) (SINGLE AP VIEW)   Final Result   1. Oral contrast progression to the distal colon, excluding high-grade   obstruction. 2.  Persistent dilated small bowel loops within the midabdomen. XR ABDOMEN (KUB) (SINGLE AP VIEW)   Final Result      RESIDUAL BARIUM IN THE PROXIMAL COLON FROM RECENT CT WOULD RENDER A SMALL BOWEL   SERIES SUBOPTIMAL IF NOT NONDIAGNOSTIC, AND THEREFORE IT IS DEFERRED PENDING A   COLON PREP. XR ABDOMEN (KUB) (SINGLE AP VIEW)   Final Result   NG tube tube tip is coiled once in the stomach with tip near the   midline. XR ABDOMEN (KUB) (SINGLE AP VIEW)   Final Result   FINDINGS / IMPRESSION:  Persistent dilated small bowel loops. There is some   contrast in the colon. NG tube is present. XR ABDOMEN (KUB) (SINGLE AP VIEW)   Final Result      1. Findings as described above. XR ABDOMEN (KUB) (SINGLE AP VIEW)   Final Result   Nasogastric tube proximal sidehole is at the gastroesophageal   junction. Advancement by approximately 10 cm is suggested. CT ABDOMEN PELVIS W IV CONTRAST Additional Contrast? Oral   Final Result   Small bowel obstruction with transition point in the right lower quadrant. XR ACUTE ABD SERIES CHEST 1 VW   Final Result   1. Persistent gaseous distention of small bowel.    2.  No acute findings in the chest.      FL SMALL BOWEL FOLLOW THROUGH ONLY    (Results Pending)       Current Meds:  Current Facility-Administered Medications   Medication Dose Route Frequency    potassium chloride 10 mEq/100 mL IVPB (Peripheral Line)  20 mEq IntraVENous 6 times per day    phenol 1.4 % mouth spray 1 spray  1 spray Mouth/Throat Q2H PRN    lidocaine viscous hcl (XYLOCAINE) 2 % solution 15 mL  15 mL Mouth/Throat Q3H PRN    dextrose 5 % and 0.9 % sodium chloride infusion   IntraVENous Continuous    polyethylene glycol (GLYCOLAX) packet 17 g  17 g Oral Daily    bisacodyl (DULCOLAX) suppository 10 mg  10 mg Rectal Daily    sodium phosphate 7.5 mmol in sodium chloride 0.9 % 250 mL IVPB  7.5 mmol IntraVENous PRN    Or    sodium phosphate 15 mmol in sodium chloride 0.9 % 250 mL IVPB  15 mmol IntraVENous PRN    pantoprazole (PROTONIX) 40 mg in sodium chloride (PF) 10 mL injection  40 mg IntraVENous Daily    sodium chloride flush 0.9 % injection 5-40 mL  5-40 mL IntraVENous 2 times per day    sodium chloride flush 0.9 % injection 5-40 mL  5-40 mL IntraVENous PRN    0.9 % sodium chloride infusion   IntraVENous PRN    potassium chloride (KLOR-CON M) extended release tablet 40 mEq  40 mEq Oral PRN    Or    potassium bicarb-citric acid (EFFER-K) effervescent tablet 40 mEq  40 mEq Oral PRN    Or    potassium chloride 10 mEq/100 mL IVPB (Peripheral Line)  10 mEq IntraVENous PRN    magnesium sulfate 2000 mg in 50 mL IVPB premix  2,000 mg IntraVENous PRN    HYDROmorphone HCl PF (DILAUDID) injection 0.25 mg  0.25 mg IntraVENous Q3H PRN    Or    HYDROmorphone HCl PF (DILAUDID) injection 0.5 mg  0.5 mg IntraVENous Q3H PRN    promethazine (PHENERGAN) tablet 12.5 mg  12.5 mg Oral Q6H PRN    Or    ondansetron (ZOFRAN) injection 4 mg  4 mg IntraVENous Q6H PRN    enoxaparin (LOVENOX) injection 40 mg  40 mg SubCUTAneous Daily       Signed:  [employer]

## 2022-08-07 NOTE — PROGRESS NOTES
Reviewed notes for new spiritual concerns. Staff is in the room providing compassionate care. Will follow.

## 2022-08-08 ENCOUNTER — APPOINTMENT (OUTPATIENT)
Dept: GENERAL RADIOLOGY | Age: 82
DRG: 389 | End: 2022-08-08
Payer: MEDICARE

## 2022-08-08 LAB
ANION GAP SERPL CALC-SCNC: 0 MMOL/L (ref 7–16)
BUN SERPL-MCNC: 4 MG/DL (ref 8–23)
CALCIUM SERPL-MCNC: 8.3 MG/DL (ref 8.3–10.4)
CHLORIDE SERPL-SCNC: 107 MMOL/L (ref 98–107)
CO2 SERPL-SCNC: 30 MMOL/L (ref 21–32)
CREAT SERPL-MCNC: 0.5 MG/DL (ref 0.6–1)
ERYTHROCYTE [DISTWIDTH] IN BLOOD BY AUTOMATED COUNT: 12.7 % (ref 11.9–14.6)
GLUCOSE BLD STRIP.AUTO-MCNC: 113 MG/DL (ref 65–100)
GLUCOSE BLD STRIP.AUTO-MCNC: 179 MG/DL (ref 65–100)
GLUCOSE SERPL-MCNC: 103 MG/DL (ref 65–100)
HCT VFR BLD AUTO: 39.7 % (ref 35.8–46.3)
HGB BLD-MCNC: 12.8 G/DL (ref 11.7–15.4)
MCH RBC QN AUTO: 29.6 PG (ref 26.1–32.9)
MCHC RBC AUTO-ENTMCNC: 32.2 G/DL (ref 31.4–35)
MCV RBC AUTO: 91.7 FL (ref 79.6–97.8)
NRBC # BLD: 0 K/UL (ref 0–0.2)
PHOSPHATE SERPL-MCNC: 2.3 MG/DL (ref 2.3–3.7)
PLATELET # BLD AUTO: 266 K/UL (ref 150–450)
PMV BLD AUTO: 8.9 FL (ref 9.4–12.3)
POTASSIUM SERPL-SCNC: 3.2 MMOL/L (ref 3.5–5.1)
RBC # BLD AUTO: 4.33 M/UL (ref 4.05–5.2)
SERVICE CMNT-IMP: ABNORMAL
SERVICE CMNT-IMP: ABNORMAL
SODIUM SERPL-SCNC: 137 MMOL/L (ref 136–145)
TRIGL SERPL-MCNC: 85 MG/DL (ref 35–150)
WBC # BLD AUTO: 9.2 K/UL (ref 4.3–11.1)

## 2022-08-08 PROCEDURE — 36415 COLL VENOUS BLD VENIPUNCTURE: CPT

## 2022-08-08 PROCEDURE — 99232 SBSQ HOSP IP/OBS MODERATE 35: CPT | Performed by: SURGERY

## 2022-08-08 PROCEDURE — 84478 ASSAY OF TRIGLYCERIDES: CPT

## 2022-08-08 PROCEDURE — 97530 THERAPEUTIC ACTIVITIES: CPT

## 2022-08-08 PROCEDURE — 80048 BASIC METABOLIC PNL TOTAL CA: CPT

## 2022-08-08 PROCEDURE — A4216 STERILE WATER/SALINE, 10 ML: HCPCS | Performed by: INTERNAL MEDICINE

## 2022-08-08 PROCEDURE — 2580000003 HC RX 258: Performed by: INTERNAL MEDICINE

## 2022-08-08 PROCEDURE — 2580000003 HC RX 258: Performed by: PHYSICIAN ASSISTANT

## 2022-08-08 PROCEDURE — 6360000004 HC RX CONTRAST MEDICATION: Performed by: NURSE PRACTITIONER

## 2022-08-08 PROCEDURE — 74250 X-RAY XM SM INT 1CNTRST STD: CPT

## 2022-08-08 PROCEDURE — 6370000000 HC RX 637 (ALT 250 FOR IP): Performed by: PHYSICIAN ASSISTANT

## 2022-08-08 PROCEDURE — 82962 GLUCOSE BLOOD TEST: CPT

## 2022-08-08 PROCEDURE — 6360000002 HC RX W HCPCS: Performed by: PHYSICIAN ASSISTANT

## 2022-08-08 PROCEDURE — 6360000002 HC RX W HCPCS: Performed by: INTERNAL MEDICINE

## 2022-08-08 PROCEDURE — 1100000000 HC RM PRIVATE

## 2022-08-08 PROCEDURE — 2500000003 HC RX 250 WO HCPCS: Performed by: PHYSICIAN ASSISTANT

## 2022-08-08 PROCEDURE — 97535 SELF CARE MNGMENT TRAINING: CPT

## 2022-08-08 PROCEDURE — 84100 ASSAY OF PHOSPHORUS: CPT

## 2022-08-08 PROCEDURE — 85027 COMPLETE CBC AUTOMATED: CPT

## 2022-08-08 PROCEDURE — C9113 INJ PANTOPRAZOLE SODIUM, VIA: HCPCS | Performed by: INTERNAL MEDICINE

## 2022-08-08 RX ORDER — DEXTROSE, SODIUM CHLORIDE, AND POTASSIUM CHLORIDE 5; .9; .15 G/100ML; G/100ML; G/100ML
INJECTION INTRAVENOUS CONTINUOUS
Status: DISCONTINUED | OUTPATIENT
Start: 2022-08-08 | End: 2022-08-09

## 2022-08-08 RX ADMIN — AZITHROMYCIN MONOHYDRATE 500 MG: 500 INJECTION, POWDER, LYOPHILIZED, FOR SOLUTION INTRAVENOUS at 16:40

## 2022-08-08 RX ADMIN — METOPROLOL TARTRATE 12.5 MG: 25 TABLET, FILM COATED ORAL at 20:27

## 2022-08-08 RX ADMIN — SODIUM CHLORIDE 40 MG: 9 INJECTION INTRAMUSCULAR; INTRAVENOUS; SUBCUTANEOUS at 08:09

## 2022-08-08 RX ADMIN — SODIUM CHLORIDE, PRESERVATIVE FREE 5 ML: 5 INJECTION INTRAVENOUS at 20:25

## 2022-08-08 RX ADMIN — POTASSIUM CHLORIDE 20 MEQ: 7.46 INJECTION, SOLUTION INTRAVENOUS at 00:30

## 2022-08-08 RX ADMIN — METOPROLOL TARTRATE 12.5 MG: 25 TABLET, FILM COATED ORAL at 16:39

## 2022-08-08 RX ADMIN — ENOXAPARIN SODIUM 40 MG: 100 INJECTION SUBCUTANEOUS at 08:10

## 2022-08-08 RX ADMIN — POTASSIUM CHLORIDE, DEXTROSE MONOHYDRATE AND SODIUM CHLORIDE: 150; 5; 900 INJECTION, SOLUTION INTRAVENOUS at 12:33

## 2022-08-08 RX ADMIN — DIATRIZOATE MEGLUMINE AND DIATRIZOATE SODIUM 170 ML: 660; 100 LIQUID ORAL; RECTAL at 11:38

## 2022-08-08 RX ADMIN — SODIUM CHLORIDE, PRESERVATIVE FREE 10 ML: 5 INJECTION INTRAVENOUS at 08:11

## 2022-08-08 ASSESSMENT — PAIN SCALES - GENERAL: PAINLEVEL_OUTOF10: 0

## 2022-08-08 NOTE — PROGRESS NOTES
Comprehensive Nutrition Assessment    Type and Reason for Visit: Initial, NPO/Clear Liquid    Nutrition Recommendations/Plan:   Continue NPO. Advance diet per MD.   NPO/Clear liquid diet status day number 6 is related to SBO . It is reasonable to wait 7-10 days before initiating nutrition support in a patient with no or low nutrition risk. Malnutrition Status: At risk for malnutrition (Comment) (NPO since admission, SBO, advanced age)  If anticipated patient will remain NPO/Clear liquid for greater than 24 additional hours, parenteral nutrition for primary needs   If PN, order appropriate route and an IP Nutrition Consult for RD to manage. Malnutrition Assessment:  Malnutrition Status: At risk for malnutrition (Comment) (NPO since admission, SBO, advanced age)    Nutrition Assessment:  Nutrition History: Pt off floor for SBFT at time of RD assessment. Daughter present. Daughter reports pt intake and appetite at baseline PTA, excluding 3 days before admission on 8/2. Daughter reports pt began experiencing nausea/vomiting with PO intake on 7/31 until admission. Daughter estimates pt UBW of ~125lb, denies any unintentional changes to weight. Do You Have Any Cultural, Restorationism, or Ethnic Food Preferences?: No   Nutrition Background:  Pt with PMH significant for tachycardia, cholecystectomy, total abdominal hysterectomy, & IBS w/ diarrhea who presented to the ED on 08/02 via EMS due to labs showing hyponatremia from Urgent Care w/ chief complaint of N/V described as bilious onset 07/29 & accompanying constipation. Pt found to have SBO, conservative management pursued. Nutrition Interval:  Pt off floor at time of RD assessment d/t SBFT. Results pending at time of RD note writing. Daughter provided nutrition hx as above, reports pt continues to have BM. Discussed potential need for TPN w/ PA. Based on SBFT results from today, potential need for TPN to be pursued 8/9.      Current Nutrition Therapies:  Diet NPO Exceptions are: Ice Chips    Current Intake:   Average Meal Intake: NPO Average Supplements Intake: NPO      Anthropometric Measures:  Height: 5' 2.4\" (158.5 cm)  Current Body Wt: 121 lb 14.6 oz (55.3 kg), Weight source: Stated  BMI: 22, Normal Weight (BMI 22.0 to 24.9) age over 72     Ideal Body Weight (Kg) (Calculated): 51 kg (112 lbs), 108.9 %  Usual Body Wt: 125 lb (56.7 kg) (per daughter), Percent weight change: -2.5       Edema:Peripheral Vascular  Peripheral Vascular (WDL): Within Defined Limits   BMI Category Normal Weight (BMI 22.0 to 24.9) age over 72  Estimated Daily Nutrient Needs:  Energy (kcal/day): 0339-0640 (25-30kcal/kg) (Kcal/kg Weight used: 55.3 kg Current  Protein (g/day): 55-70 (1-1.25g/kg) Weight Used: (Current) 55.3 kg  Fluid (ml/day):   (1 ml/kcal)    Nutrition Diagnosis:   Inadequate oral intake related to altered GI function as evidenced by  (NPO r/t SBO)    Nutrition Interventions:   Food and/or Nutrient Delivery: Continue NPO (Advance diet vs TPN by next RD assessment)     Coordination of Nutrition Care: Continue to monitor while inpatient, Interdisciplinary Rounds  Plan of Care discussed with: Kristopher Chavez    Goals: Active Goal:  (nutrition intervention within [address] by next RD assessment)       Nutrition Monitoring and Evaluation:      Food/Nutrient Intake Outcomes: Diet Advancement/Tolerance  Physical Signs/Symptoms Outcomes: Weight    Discharge Planning:     Too soon to determine    Ziyad Monday) Flako Schneider, [address] [place] [address],   Contact: [phone]

## 2022-08-08 NOTE — PROGRESS NOTES
Bed to Chair [] [] [] [] [x] [] [] [] [] [] []    Stand to Sit [] [] [] [x] [] [] [] [] [] [] []     [] [] [] [] [] [] [] [] [] [] []    I=Independent, Mod I=Modified Independent, S=Supervision, SBA=Standby Assistance, CGA=Contact Guard Assistance,   Min=Minimal Assistance, Mod=Moderate Assistance, Max=Maximal Assistance, Total=Total Assistance, NT=Not Tested    BALANCE: Good Fair+ Fair Fair- Poor NT Comments   Sitting Static [x] [] [] [] [] []    Sitting Dynamic [x] [] [] [] [] []              Standing Static [] [x] [] [] [] []    Standing Dynamic [] [x] [] [] [] []      GAIT: I Mod I S SBA CGA Min Mod Max Total  NT x2 Comments:   Level of Assistance [] [] [] [] [x] [] [] [] [] [] []    Distance 50 feet    DME Rolling Walker    Gait Quality Decreased step clearance and Decreased step length    Weightbearing Status      Stairs      I=Independent, Mod I=Modified Independent, S=Supervision, SBA=Standby Assistance, CGA=Contact Guard Assistance,   Min=Minimal Assistance, Mod=Moderate Assistance, Max=Maximal Assistance, Total=Total Assistance, NT=Not Tested    PLAN:   ACUTE PHYSICAL THERAPY GOALS:   (Developed with and agreed upon by patient and/or caregiver.)  (1.) Zulay Mac  will move from supine to sit and sit to supine  with INDEPENDENCE within 7 treatment day(s). (2.) Zulay Mac will transfer from bed to chair and chair to bed with MODIFIED INDEPENDENCE using the least restrictive device within 7 treatment day(s). (3.) Zulay Mac will ambulate with MODIFIED INDEPENDENCE for 500 feet with the least restrictive device within 7 treatment day(s). (4.) Zulay Mac will perform standing static and dynamic balance activities x 10 minutes with SUPERVISION to improve safety within 7 treatment day(s).   (5.) Zulay Mac will ascend and descend 4 stairs using 1 hand rail(s) with SUPERVISION to improve functional mobility and safety within 7 treatment day(s). (6.) aDisy Treviño will perform bilateral lower extremity exercises x 20 min for HEP with INDEPENDENCE to improve strength, endurance, and functional mobility within 7 treatment day(s). FREQUENCY AND DURATION: 3 times/week for duration of hospital stay or until stated goals are met, whichever comes first.    TREATMENT:   TREATMENT:   Therapeutic Activity (23 Minutes): Therapeutic activity included Rolling, Supine to Sit, Scooting, Transfer Training, Ambulation on level ground, Sitting balance , and Standing balance to improve functional Activity tolerance, Balance, Mobility, and Strength. TREATMENT GRID:   Date:  8/5/22 Date:   Date:     Activity/Exercise Parameters Parameters Parameters   Standing hip flex/abd/ext X 5 B     Seated Marching X 10 B     LAQs X 10 B                                 AFTER TREATMENT PRECAUTIONS: Bed/Chair Locked, Call light within reach, Chair, Needs within reach, RN notified, and Visitors at bedside    INTERDISCIPLINARY COLLABORATION:  RN/ PCT    EDUCATION: Education Given To: Patient  Education Provided: Role of Therapy;Plan of Care;Transfer Training;Energy Conservation; Fall Prevention Strategies; Equipment  Education Method: Verbal  Barriers to Learning: None  Education Outcome: Verbalized understanding    TIME IN/OUT:  Time In: 1417  Time Out: 1440  Minutes: Lisseth Alvarez 44 JOSE ENRIQUE, PT

## 2022-08-08 NOTE — PROGRESS NOTES
Admit Date: 2022    POD * No surgery found *    Procedure:  * No surgery found *    Subjective:     Pt awake in bed. No issues overnight. Continues to report a small amount of flatus and moderate size BM's. NG tube in place, with approximately 400mL of dark brown liquid out overnight. Denies belching, nausea, and vomiting. AF, NAD. K+ 3.2.     22 KUB today showing  Impression       1. Slight interval progression in small bowel obstruction. CPT code(s) 69890                             Objective:       Vitals:    22 2306 22 2338 22 0400 22 0439   BP: (!) 117/54 119/64 105/74 123/66   Pulse: (!) 118 (!) 106 68 (!) 103   Resp: 18    Temp: 98.8 °F (37.1 °C) 99.3 °F (37.4 °C) 97.6 °F (36.4 °C) 98.8 °F (37.1 °C)   TempSrc: Oral Oral Oral Oral   SpO2: 94% 97% 96% 99%   Weight:       Height:           Temp (24hrs), Av °F (37.2 °C), Min:97.6 °F (36.4 °C), Max:100.6 °F (38.1 °C)    Intake/Output Summary (Last 24 hours) at 2022 2327  Last data filed at 2022 1905  Gross per 24 hour   Intake --   Output 400 ml   Net -400 ml           Physical Exam:   Physical Exam  HENT:      Nose:      Comments: NG to LIS     Mouth/Throat:      Mouth: Mucous membranes are moist.   Cardiovascular:      Rate and Rhythm: Normal rate. Heart sounds: Normal heart sounds. Comments: Slightly tachy  Pulmonary:      Effort: Pulmonary effort is normal.      Breath sounds: Normal breath sounds. Abdominal:      Palpations: Abdomen is soft. Comments:  Non-tender, still mildly distended   Neurological:      Mental Status: She is alert.          Labs:   Recent Results (from the past 24 hour(s))   POCT Glucose    Collection Time: 22 11:31 AM   Result Value Ref Range    POC Glucose 100 65 - 100 mg/dL    Performed by: [email or web address]    CBC    Collection Time: 22  4:15 AM   Result Value Ref Range    WBC 9.2 4.3 - 11.1 K/uL    RBC 4.33 4.05 - 5.2 M/uL    Hemoglobin 12.8 11.7 - 15.4 g/dL    Hematocrit 39.7 35.8 - 46.3 %    MCV 91.7 79.6 - 97.8 FL    MCH 29.6 26.1 - 32.9 PG    MCHC 32.2 31.4 - 35.0 g/dL    RDW 12.7 11.9 - 14.6 %    Platelets 653 251 - 306 K/uL    MPV 8.9 (L) 9.4 - 12.3 FL    nRBC 0.00 0.0 - 0.2 K/uL   Phosphorus    Collection Time: 08/08/22  4:15 AM   Result Value Ref Range    Phosphorus 2.3 2.3 - 3.7 MG/DL   Basic Metabolic Panel    Collection Time: 08/08/22  4:15 AM   Result Value Ref Range    Sodium 137 136 - 145 mmol/L    Potassium 3.2 (L) 3.5 - 5.1 mmol/L    Chloride 107 98 - 107 mmol/L    CO2 30 21 - 32 mmol/L    Anion Gap 0 (L) 7 - 16 mmol/L    Glucose 103 (H) 65 - 100 mg/dL    BUN 4 (L) 8 - 23 MG/DL    Creatinine 0.50 (L) 0.6 - 1.0 MG/DL    GFR African American >60 >60 ml/min/1.73m2    GFR Non- >60 >60 ml/min/1.73m2    Calcium 8.3 8.3 - 10.4 MG/DL   POCT Glucose    Collection Time: 08/08/22  5:59 AM   Result Value Ref Range    POC Glucose 113 (H) 65 - 100 mg/dL    Performed by: Soila        Data Review as above    XR Results (most recent):  XR ABDOMEN (KUB) (SINGLE AP VIEW)  Narrative: Exam: XR ABDOMEN LIMITED (KUB) on 8/7/2022 8:50 AM    Clinical History: The female patient is 80years old  presenting for small bowel  obstruction. Comparison:  Abdomen films 8/6/2022    Findings:      Single view of the abdomen demonstrates slightly increasing mid abdominal small  bowel gaseous distention with relative decompression of the remaining bowel. An  NG tube remains in place. There are cholecystectomy clips. There is no soft  tissue mass or organomegaly. No gross free intraperitoneal air is identified. No acute osseous abnormality is demonstrated. Impression: 1. Slight interval progression in small bowel obstruction.     CPT code(s) 37823     Assessment:     Principal Problem:    Small bowel obstruction (HCC)  Active Problems:    OAB (overactive bladder)    Acute hypokalemia    Hyponatremia    Prerenal azotemia    Nausea and vomiting    Sinus tachycardia    Abnormal CT of the abdomen  Resolved Problems:    * No resolved hospital problems. *    SBO     Plan/Recommendations/Medical Decision Making:   Care Management per Hospitalist  Continue non surgical management with bowel rest and IVF  SBFT ordered for today. I discussed with her that I suspect she may need surgery as she reports she is passing some flatus and having some BMs but she still distended. She will really like to avoid surgery but I am concern she has a pSBO from adhesions.    Cont NPO, NGT to JOSE Unger replaced per Hospitalist      Klaus De La Rosa MD  Bariatric & Minimally Invasive Surgery  [facility]  8/8/2022 7:26 AM

## 2022-08-08 NOTE — PROGRESS NOTES
OCCUPATIONAL THERAPY: Daily Note AM   OT Visit Days: 3   Time  OT Charge Capture  Rehab Caseload Tracker  OT Orders    Dell Reynolds is a 80 y.o. female   PRIMARY DIAGNOSIS: Small bowel obstruction (HCC)  Hypokalemia [E87.6]  Hyponatremia [E87.1]  Small bowel obstruction ([address] [K56.609]       Inpatient: Payor: MEDICARE / Plan: MEDICARE PART A AND B / Product Type: *No Product type* /     ASSESSMENT:     REHAB RECOMMENDATIONS: CURRENT LEVEL OF FUNCTION:  (Most Recently Demonstrated)   Recommendation to date pending progress:  Setting:  No further skilled therapy after discharge from hospital    Equipment:    To Be Determined Bathing:  Not Tested  Dressing:  Not Tested  Feeding/Grooming:  Supervision/Setup  Toileting:  Supervision/Setup  Functional Mobility:  Supervision/Setup     ASSESSMENT:  Ms. Hodan Garzon was sitting up in chair upon arrival. Pt completed functional mobility and functional transfer with supervision. Pt competed toileting with supervision. Pt completed grooming with supervision. Continue POC.         SUBJECTIVE:     Ms. Hodan Garzon states, \"Hey\"     Social/Functional Lives With: Spouse  Type of Home: House  Home Layout: One level  Home Access: Stairs to enter with rails  Entrance Stairs - Number of Steps: 4  Has the patient had two or more falls in the past year or any fall with injury in the past year?: No  ADL Assistance: Independent  Ambulation Assistance: Independent  Transfer Assistance: Independent    OBJECTIVE:     Kevin Patterson / Darlyn Zuluaga / Cecy Barajason: IV and Nasogastric Tube    RESTRICTIONS/PRECAUTIONS:  Restrictions/Precautions  Restrictions/Precautions: NPO        PAIN: VITALS / O2:   Pre Treatment: 0             Post Treatment: 0 Vitals          Oxygen            MOBILITY: I Mod I S SBA CGA Min Mod Max Total  NT x2 Comments:   Bed Mobility    Rolling [] [] [] [] [] [] [] [] [] [] []    Supine to Sit [] [] [] [] [] [] [] [] [] [] []    Scooting [] [] [] [] [] [] [] [] [] [] []    Sit to Supine [] [] [x] [] [] [] [] [] [] [] []    Transfers    Sit to Stand [] [] [x] [] [] [] [] [] [] [] []    Bed to Chair [] [] [] [] [] [] [] [] [] [] []    Stand to Sit [] [] [] [] [] [] [] [] [] [] []    Tub/Shower [] [] [] [] [] [] [] [] [] [] []     Toilet [] [] [x] [] [] [] [] [] [] [] []      [] [] [] [] [] [] [] [] [] [] []    I=Independent, Mod I=Modified Independent, S=Supervision/Setup, SBA=Standby Assistance, CGA=Contact Guard Assistance, Min=Minimal Assistance, Mod=Moderate Assistance, Max=Maximal Assistance, Total=Total Assistance, NT=Not Tested    ACTIVITIES OF DAILY LIVING: I Mod I S SBA CGA Min Mod Max Total NT Comments   BASIC ADLs:              Upper Body   Bathing [] [] [] [] [] [] [] [] [] []    Lower Body Bathing [] [] [] [] [] [] [] [] [] []    Toileting [] [] [] [] [] [] [] [] [] []    Upper Body Dressing [] [] [] [] [] [] [] [] [] []    Lower Body Dressing [] [] [] [] [] [] [] [] [] []    Feeding [] [] [] [] [] [] [] [] [] []    Grooming [] [] [x] [] [] [] [] [] [] []    Personal Device Care [] [] [] [] [] [] [] [] [] []    Functional Mobility [] [] [] [] [] [] [] [] [] []    I=Independent, Mod I=Modified Independent, S=Supervision/Setup, SBA=Standby Assistance, CGA=Contact Guard Assistance, Min=Minimal Assistance, Mod=Moderate Assistance, Max=Maximal Assistance, Total=Total Assistance, NT=Not Tested    BALANCE: Good Fair+ Fair Fair- Poor NT Comments   Sitting Static [x] [] [] [] [] []    Sitting Dynamic [] [] [] [] [] []              Standing Static [x] [] [] [] [] []    Standing Dynamic [] [] [] [] [] []        PLAN:     FREQUENCY/DURATION   OT Plan of Care: 3 times/week for duration of hospital stay or until stated goals are met, whichever comes first.    ACUTE OCCUPATIONAL THERAPY GOALS:   (Developed with and agreed upon by patient and/or caregiver.)  1. Patient will complete lower body bathing and dressing with modified independence and adaptive equipment as needed.   2. Patient will complete toileting with modified independence. 3. Patient will tolerate 30 minutes of OT treatment with as needed rest breaks to increase activity tolerance for ADLs. 4. Patient will complete functional transfers with modified independence and adaptive equipment as needed. 5. Patient will complete grooming in standing at sink with modified independence. TREATMENT:     TREATMENT:   Self Care (30 minutes): Patient participated in toileting and grooming ADLs in unsupported sitting and standing with no verbal and manual cueing to increase independence and decrease assistance required. Patient also participated in functional transfer and energy conservation training to increase independence and decrease assistance required.      TREATMENT GRID:  N/A    AFTER TREATMENT PRECAUTIONS: Bed, Bed/Chair Locked, Call light within reach, Needs within reach, RN notified, Side rails x3, and Visitors at bedside    INTERDISCIPLINARY COLLABORATION:  RN/ PCT and OT/ SEVILLA    EDUCATION:       TOTAL TREATMENT DURATION AND TIME:  Time In: 5945  Time Out: 52 [facility]  Minutes: [place], [facility]

## 2022-08-08 NOTE — PROGRESS NOTES
Physical Therapy Note:    Attempted to see patient this AM for physical therapy treatment  session. Patient SHANELLE at x-ray. Will follow and re-attempt as schedule permits/patient available.  Thank you,    SACHIN QUISPE, PT     Rehab Caseload Tracker

## 2022-08-08 NOTE — PROGRESS NOTES
Hospitalist Progress Note   Admit Date:  2022 11:25 AM   Name:  Chris Woodson   Age:  80 y.o. Sex:  female  :  1940   MRN:  [id number]   Room:  [facility]/    Presenting Complaint: Emesis     Reason(s) for Admission: Hypokalemia [E87.6]  Hyponatremia [E87.1]  Small bowel obstruction [facility] Hospital Course & Interval History:   Vinay Boykin (\"Laverle\") is a 68-year-old  female with history of tachycardia, cholecystectomy, total abdominal hysterectomy, & IBS w/ diarrhea who presented to the ED on  via EMS due to labs showing hyponatremia from Urgent Care w/ chief complaint of N/V described as bilious onset  & accompanying constipation. She had distention & a bloated feeling at presentation. She endorsed last BM  & denies passing flatus since that time, despite attempting a suppository prior to going to Urgent Care. She was seen in ED on  w/ chief complaint of increasing lower abdominal pain onset  w/ aforementioned N/V onset . She described pain as achy & sharp, moving from in her epigastric region to her lower abdomen. She took Zofran  w/o relief. She denied experiencing similar pain before. She had R lower abdominal TTP at that time. She was diagnosed with likely enteritis based on CT scan & d/c'd home w/ prescription for Zofran, as well as directions to f/u w/ PCP. Found in ED on  to have hypokalemia, hyponatremia, & SBO w/ transition point in the RLQ based on CTAP  & XR KUB  supporting bowel distention. NG tube to LWIS placed. Admitted to hospital for SBO. Patient transferred to 7th floor from ED around 6:30 PM.     Reports Ih/o BS w/ diarrhea for \"many years\": States that has taken cholestyramine light most mornings for 20 years for diarrhea, but then not taken it days when feels constipated.  States that usually just one day of constipation and no cholestyramine light and then diarrhea is back again so starts taking. Reports last colonoscopy was 4 years ago, during which polyps were removed, but denies any notification of concerning findings by PCP at [facility]. Colonoscopy also 05/18/10    Reports family history: mother had colorectal cancer found on routine colonoscopy & treated successfully with colon resection & 1 medication w/o return. Father had \"really bad\" diabetes & heart attack in his 66's. 8/7/2022 s/p BM  8/8/2022 SBFT no findings of SBO      Subjective/24hr Events (08/08/22):   \"I'm so happy that I don't have a blockage and I don't need surgery. \"  Pleasant 81y.o. WF laying in bed, daughter at bedside    C/o persistent sore throat (due to NGT); denies abd pain, cough, fever, chills, N/V, chest pain    Assessment & Plan:     Principal Problem:    Small bowel obstruction  - s/p SBFT today with good passage of contrast, no findings of SBO  - appreciate surgery's assistance, ordered for NGT removal and clear liquid diet; Dr. Magaly Guardado signed off  - cont supportive care / IVF and closely monitor  - if tolerates liquid next 12hrs will transition to full liquid   - hopefully dc home next 24-48 hrs     Active Problems:    Hypoglycemia  - improved with D5; will dc in AM if pt tolerates oral intake  - attributed to NPO      Hypokalemia  - improved; 2.9 ~> 3.2 this AM  - cont supplementation via IVF  - cont to monitor      Hypophosphatemia  - resolved      Betahemolytic Group B strept UTI / fever  - cont zithromax (pt reports to anaphylaxis with PNC, would avoid cephalosporins)  - monitor        Chronic mild sinus tachycardia  - start metoprolol 12.5mg bid; monitor       Discharge Planning:    - pending clinical course; if tolerates clear liquid and advances in AM, anticipate dc home next 24-48yhrs      Diet:  ADULT DIET;  Clear Liquid  DVT PPx: Lovenox  Code status: Full Code    Hospital Problems:  Principal Problem:    Small bowel obstruction ([address]  Active Problems:    OAB (overactive bladder)    Acute hypokalemia Hyponatremia    Prerenal azotemia    Nausea and vomiting    Sinus tachycardia    Abnormal CT of the abdomen  Resolved Problems:    * No resolved hospital problems. *      Objective:   Patient Vitals for the past 24 hrs:   Temp Pulse Resp BP SpO2   08/08/22 0743 98.2 °F (36.8 °C) (!) 109 18 110/66 98 %   08/08/22 0439 98.8 °F (37.1 °C) (!) 103 16 123/66 99 %   08/08/22 0400 97.6 °F (36.4 °C) 68 20 105/74 96 %   08/07/22 2338 99.3 °F (37.4 °C) (!) 106 18 119/64 97 %   08/07/22 2306 98.8 °F (37.1 °C) (!) 118 18 (!) 117/54 94 %   08/07/22 2055 (!) 100.6 °F (38.1 °C) (!) 108 18 (!) 115/42 99 %   08/07/22 2051 (!) 100.6 °F (38.1 °C) (!) 108 18 (!) 115/42 99 %   08/07/22 1528 98.6 °F (37 °C) (!) 110 17 (!) 117/52 99 %         Oxygen Therapy  SpO2: 98 %  Pulse via Oximetry: 101 beats per minute  Pulse Oximeter Device Mode: Continuous  O2 Device: None (Room air)    Estimated body mass index is 22.03 kg/m² as calculated from the following:    Height as of this encounter: 5' 2.4\" (1.585 m). Weight as of this encounter: 122 lb (55.3 kg). Intake/Output Summary (Last 24 hours) at 8/8/2022 1402  Last data filed at 8/7/2022 1905  Gross per 24 hour   Intake --   Output 400 ml   Net -400 ml           Physical Exam:     Blood pressure 110/66, pulse (!) 109, temperature 98.2 °F (36.8 °C), temperature source Axillary, resp. rate 18, height 5' 2.4\" (1.585 m), weight 122 lb (55.3 kg), SpO2 98 %. General:    Well developed in NAD. Head:  Normocephalic, atraumatic  Eyes:  Sclerae appear normal.  Pupils equally round. ENT:  Nares appear normal, no drainage. Moist oral mucosa, NGT in place on exam  Neck:  No restricted ROM. Trachea midline   CV:   Mild tachycardia; no m/r/g. No jugular venous distension. Lungs:   CTAB. No wheezing, rhonchi, or rales. Symmetric expansion. Abdomen:   Greatly improved BS this AM; no guarding, NT, nondistended  Extremities: No cyanosis or clubbing.   No edema  Skin:     No rashes and normal coloration. Warm and dry. Neuro:  CN II-XII grossly intact. Sensation intact. A&Ox3  Psych:  Normal mood and affect. I have personally reviewed labs and tests showing:  Recent Labs:  Recent Results (from the past 48 hour(s))   POCT Glucose    Collection Time: 08/06/22  3:20 PM   Result Value Ref Range    POC Glucose 96 65 - 100 mg/dL    Performed by: Mandie    POCT Glucose    Collection Time: 08/06/22 11:54 PM   Result Value Ref Range    POC Glucose 122 (H) 65 - 100 mg/dL    Performed by:  Therese    Basic Metabolic Panel w/ Reflex to MG    Collection Time: 08/07/22  5:07 AM   Result Value Ref Range    Sodium 137 136 - 145 mmol/L    Potassium 2.9 (L) 3.5 - 5.1 mmol/L    Chloride 106 98 - 107 mmol/L    CO2 25 21 - 32 mmol/L    Anion Gap 6 (L) 7 - 16 mmol/L    Glucose 125 (H) 65 - 100 mg/dL    BUN 3 (L) 8 - 23 MG/DL    Creatinine 0.40 (L) 0.6 - 1.0 MG/DL    GFR African American >60 >60 ml/min/1.73m2    GFR Non- >60 >60 ml/min/1.73m2    Calcium 8.2 (L) 8.3 - 10.4 MG/DL   CBC    Collection Time: 08/07/22  5:07 AM   Result Value Ref Range    WBC 9.3 4.3 - 11.1 K/uL    RBC 4.13 4.05 - 5.2 M/uL    Hemoglobin 12.3 11.7 - 15.4 g/dL    Hematocrit 37.4 35.8 - 46.3 %    MCV 90.6 79.6 - 97.8 FL    MCH 29.8 26.1 - 32.9 PG    MCHC 32.9 31.4 - 35.0 g/dL    RDW 12.5 11.9 - 14.6 %    Platelets 626 070 - 916 K/uL    MPV 9.0 (L) 9.4 - 12.3 FL    nRBC 0.00 0.0 - 0.2 K/uL   Phosphorus    Collection Time: 08/07/22  5:07 AM   Result Value Ref Range    Phosphorus 2.0 (L) 2.3 - 3.7 MG/DL   Magnesium    Collection Time: 08/07/22  5:07 AM   Result Value Ref Range    Magnesium 1.9 1.8 - 2.4 mg/dL   Magnesium    Collection Time: 08/07/22  5:07 AM   Result Value Ref Range    Magnesium 1.8 1.8 - 2.4 mg/dL   POCT Glucose    Collection Time: 08/07/22  6:00 AM   Result Value Ref Range    POC Glucose 141 (H) 65 - 100 mg/dL    Performed by: Latisha    POCT Glucose    Collection Time: 08/07/22 11:31 AM   Result Value Ref Range    POC Glucose 100 65 - 100 mg/dL    Performed by: Tailored 19    Triglyceride    Collection Time: 08/08/22  4:10 AM   Result Value Ref Range    Triglycerides 85 35 - 150 MG/DL   CBC    Collection Time: 08/08/22  4:15 AM   Result Value Ref Range    WBC 9.2 4.3 - 11.1 K/uL    RBC 4.33 4.05 - 5.2 M/uL    Hemoglobin 12.8 11.7 - 15.4 g/dL    Hematocrit 39.7 35.8 - 46.3 %    MCV 91.7 79.6 - 97.8 FL    MCH 29.6 26.1 - 32.9 PG    MCHC 32.2 31.4 - 35.0 g/dL    RDW 12.7 11.9 - 14.6 %    Platelets 463 604 - 639 K/uL    MPV 8.9 (L) 9.4 - 12.3 FL    nRBC 0.00 0.0 - 0.2 K/uL   Phosphorus    Collection Time: 08/08/22  4:15 AM   Result Value Ref Range    Phosphorus 2.3 2.3 - 3.7 MG/DL   Basic Metabolic Panel    Collection Time: 08/08/22  4:15 AM   Result Value Ref Range    Sodium 137 136 - 145 mmol/L    Potassium 3.2 (L) 3.5 - 5.1 mmol/L    Chloride 107 98 - 107 mmol/L    CO2 30 21 - 32 mmol/L    Anion Gap 0 (L) 7 - 16 mmol/L    Glucose 103 (H) 65 - 100 mg/dL    BUN 4 (L) 8 - 23 MG/DL    Creatinine 0.50 (L) 0.6 - 1.0 MG/DL    GFR African American >60 >60 ml/min/1.73m2    GFR Non- >60 >60 ml/min/1.73m2    Calcium 8.3 8.3 - 10.4 MG/DL   POCT Glucose    Collection Time: 08/08/22  5:59 AM   Result Value Ref Range    POC Glucose 113 (H) 65 - 100 mg/dL    Performed by: Belinda Carrel        I have personally reviewed imaging studies showing: Other Studies:  FL SMALL BOWEL FOLLOW THROUGH ONLY   Final Result   Normal small bowel follow-through exam. No findings present to   indicate small bowel obstruction. Total fluoroscopy time: 0.2 minutes; three fluoroscopic spot image saved. XR ABDOMEN (KUB) (SINGLE AP VIEW)   Final Result      1. Slight interval progression in small bowel obstruction. CPT code(s) 73407                     XR ABDOMEN (KUB) (SINGLE AP VIEW)   Final Result   Slightly improved small bowel distention. XR ABDOMEN (KUB) (SINGLE AP VIEW)   Final Result   1. Oral contrast progression to the distal colon, excluding high-grade   obstruction. 2.  Persistent dilated small bowel loops within the midabdomen. XR ABDOMEN (KUB) (SINGLE AP VIEW)   Final Result      RESIDUAL BARIUM IN THE PROXIMAL COLON FROM RECENT CT WOULD RENDER A SMALL BOWEL   SERIES SUBOPTIMAL IF NOT NONDIAGNOSTIC, AND THEREFORE IT IS DEFERRED PENDING A   COLON PREP. XR ABDOMEN (KUB) (SINGLE AP VIEW)   Final Result   NG tube tube tip is coiled once in the stomach with tip near the   midline. XR ABDOMEN (KUB) (SINGLE AP VIEW)   Final Result   FINDINGS / IMPRESSION:  Persistent dilated small bowel loops. There is some   contrast in the colon. NG tube is present. XR ABDOMEN (KUB) (SINGLE AP VIEW)   Final Result      1. Findings as described above. XR ABDOMEN (KUB) (SINGLE AP VIEW)   Final Result   Nasogastric tube proximal sidehole is at the gastroesophageal   junction. Advancement by approximately 10 cm is suggested. CT ABDOMEN PELVIS W IV CONTRAST Additional Contrast? Oral   Final Result   Small bowel obstruction with transition point in the right lower quadrant. XR ACUTE ABD SERIES CHEST 1 VW   Final Result   1. Persistent gaseous distention of small bowel.    2.  No acute findings in the chest.          Current Meds:  Current Facility-Administered Medications   Medication Dose Route Frequency    dextrose 5 % and 0.9 % NaCl with KCl 20 mEq infusion   IntraVENous Continuous    diatrizoate meglumine-sodium (GASTROGRAFIN) 66-10 % solution 170 mL  170 mL Per NG tube ONCE PRN    phenol 1.4 % mouth spray 1 spray  1 spray Mouth/Throat Q2H PRN    lidocaine viscous hcl (XYLOCAINE) 2 % solution 15 mL  15 mL Mouth/Throat Q3H PRN    azithromycin (ZITHROMAX) 500 mg in sodium chloride 0.9 % 250 mL IVPB ([id number])  500 mg IntraVENous Q24H    polyethylene glycol (GLYCOLAX) packet 17 g  17 g Oral Daily    bisacodyl (DULCOLAX) suppository 10 mg  10 mg Rectal Daily    sodium phosphate 7.5 mmol in sodium chloride 0.9 % 250 mL IVPB  7.5 mmol IntraVENous PRN    Or    sodium phosphate 15 mmol in sodium chloride 0.9 % 250 mL IVPB  15 mmol IntraVENous PRN    pantoprazole (PROTONIX) 40 mg in sodium chloride (PF) 10 mL injection  40 mg IntraVENous Daily    sodium chloride flush 0.9 % injection 5-40 mL  5-40 mL IntraVENous 2 times per day    sodium chloride flush 0.9 % injection 5-40 mL  5-40 mL IntraVENous PRN    0.9 % sodium chloride infusion   IntraVENous PRN    potassium chloride (KLOR-CON M) extended release tablet 40 mEq  40 mEq Oral PRN    Or    potassium bicarb-citric acid (EFFER-K) effervescent tablet 40 mEq  40 mEq Oral PRN    Or    potassium chloride 10 mEq/100 mL IVPB (Peripheral Line)  10 mEq IntraVENous PRN    magnesium sulfate 2000 mg in 50 mL IVPB premix  2,000 mg IntraVENous PRN    HYDROmorphone HCl PF (DILAUDID) injection 0.25 mg  0.25 mg IntraVENous Q3H PRN    Or    HYDROmorphone HCl PF (DILAUDID) injection 0.5 mg  0.5 mg IntraVENous Q3H PRN    promethazine (PHENERGAN) tablet 12.5 mg  12.5 mg Oral Q6H PRN    Or    ondansetron (ZOFRAN) injection 4 mg  4 mg IntraVENous Q6H PRN    enoxaparin (LOVENOX) injection 40 mg  40 mg SubCUTAneous Daily       Signed:  [employer]

## 2022-08-08 NOTE — PROGRESS NOTES
SBFT normal.     Will remove NGT. Advance diet as tolerated. No surgical intervention needed. Call as needed.     Kay Mcfarlane MD  Bariatric & Minimally Invasive Surgery  [facility]  8/8/2022 12:39 PM

## 2022-08-09 VITALS
RESPIRATION RATE: 18 BRPM | BODY MASS INDEX: 23 KG/M2 | TEMPERATURE: 98.4 F | DIASTOLIC BLOOD PRESSURE: 46 MMHG | OXYGEN SATURATION: 99 % | HEIGHT: 62 IN | WEIGHT: 125 LBS | HEART RATE: 99 BPM | SYSTOLIC BLOOD PRESSURE: 109 MMHG

## 2022-08-09 LAB
ANION GAP SERPL CALC-SCNC: 6 MMOL/L (ref 7–16)
BUN SERPL-MCNC: 4 MG/DL (ref 8–23)
CALCIUM SERPL-MCNC: 8.1 MG/DL (ref 8.3–10.4)
CHLORIDE SERPL-SCNC: 107 MMOL/L (ref 98–107)
CO2 SERPL-SCNC: 27 MMOL/L (ref 21–32)
CREAT SERPL-MCNC: 0.8 MG/DL (ref 0.6–1)
GLUCOSE BLD STRIP.AUTO-MCNC: 108 MG/DL (ref 65–100)
GLUCOSE BLD STRIP.AUTO-MCNC: 109 MG/DL (ref 65–100)
GLUCOSE SERPL-MCNC: 127 MG/DL (ref 65–100)
MAGNESIUM SERPL-MCNC: 1.8 MG/DL (ref 1.8–2.4)
POTASSIUM SERPL-SCNC: 2.5 MMOL/L (ref 3.5–5.1)
SERVICE CMNT-IMP: ABNORMAL
SERVICE CMNT-IMP: ABNORMAL
SODIUM SERPL-SCNC: 140 MMOL/L (ref 136–145)

## 2022-08-09 PROCEDURE — 36415 COLL VENOUS BLD VENIPUNCTURE: CPT

## 2022-08-09 PROCEDURE — 6360000002 HC RX W HCPCS: Performed by: INTERNAL MEDICINE

## 2022-08-09 PROCEDURE — 80048 BASIC METABOLIC PNL TOTAL CA: CPT

## 2022-08-09 PROCEDURE — 2580000003 HC RX 258: Performed by: INTERNAL MEDICINE

## 2022-08-09 PROCEDURE — 82962 GLUCOSE BLOOD TEST: CPT

## 2022-08-09 PROCEDURE — A4216 STERILE WATER/SALINE, 10 ML: HCPCS | Performed by: INTERNAL MEDICINE

## 2022-08-09 PROCEDURE — 6370000000 HC RX 637 (ALT 250 FOR IP): Performed by: PHYSICIAN ASSISTANT

## 2022-08-09 PROCEDURE — C9113 INJ PANTOPRAZOLE SODIUM, VIA: HCPCS | Performed by: INTERNAL MEDICINE

## 2022-08-09 PROCEDURE — 83735 ASSAY OF MAGNESIUM: CPT

## 2022-08-09 RX ORDER — AZITHROMYCIN 250 MG/1
500 TABLET, FILM COATED ORAL DAILY
Status: DISCONTINUED | OUTPATIENT
Start: 2022-08-09 | End: 2022-08-09 | Stop reason: HOSPADM

## 2022-08-09 RX ORDER — AZITHROMYCIN 500 MG/1
500 TABLET, FILM COATED ORAL DAILY
Qty: 3 TABLET | Refills: 0 | Status: SHIPPED | OUTPATIENT
Start: 2022-08-09 | End: 2022-08-12

## 2022-08-09 RX ORDER — POTASSIUM CHLORIDE 20 MEQ/1
40 TABLET, EXTENDED RELEASE ORAL ONCE
Status: COMPLETED | OUTPATIENT
Start: 2022-08-09 | End: 2022-08-09

## 2022-08-09 RX ORDER — POTASSIUM CHLORIDE 20 MEQ/1
20 TABLET, EXTENDED RELEASE ORAL 2 TIMES DAILY
Qty: 8 TABLET | Refills: 0 | Status: SHIPPED | OUTPATIENT
Start: 2022-08-09 | End: 2022-08-13

## 2022-08-09 RX ORDER — POTASSIUM CHLORIDE 20 MEQ/1
20 TABLET, EXTENDED RELEASE ORAL 2 TIMES DAILY WITH MEALS
Status: DISCONTINUED | OUTPATIENT
Start: 2022-08-10 | End: 2022-08-09 | Stop reason: HOSPADM

## 2022-08-09 RX ADMIN — POTASSIUM CHLORIDE 40 MEQ: 20 TABLET, EXTENDED RELEASE ORAL at 12:15

## 2022-08-09 RX ADMIN — SODIUM CHLORIDE 40 MG: 9 INJECTION INTRAMUSCULAR; INTRAVENOUS; SUBCUTANEOUS at 08:35

## 2022-08-09 RX ADMIN — ENOXAPARIN SODIUM 40 MG: 100 INJECTION SUBCUTANEOUS at 08:35

## 2022-08-09 RX ADMIN — SODIUM CHLORIDE, PRESERVATIVE FREE 10 ML: 5 INJECTION INTRAVENOUS at 08:37

## 2022-08-09 ASSESSMENT — PAIN SCALES - GENERAL
PAINLEVEL_OUTOF10: 0
PAINLEVEL_OUTOF10: 0

## 2022-08-09 NOTE — CARE COORDINATION
CM continuing to follow for ongoing discharge planning. Patient's discharge plan reviewed in IDT rounds. No therapy needs noted at discharge. Possible surgical intervention will be necessary. This could alter needs at discharge. CM will continue to follow.

## 2022-08-09 NOTE — PROGRESS NOTES
Wt: 125 lb (56.7 kg) (8/9), Weight source: Bed Scale  BMI: 22.6, Normal Weight (BMI 22.0 to 24.9) age over 72     Ideal Body Weight (Kg) (Calculated): 51 kg (112 lbs), 108.9 %  Usual Body Wt: 125 lb (56.7 kg) (per daughter), Percent weight change: -2.5       Edema:Peripheral Vascular  Peripheral Vascular (WDL): Within Defined Limits   BMI Category Normal Weight (BMI 22.0 to 24.9) age over 72  Estimated Daily Nutrient Needs:  Energy (kcal/day): 8147-9794 (25-30kcal/kg) (Kcal/kg Weight used: 55.3 kg Current  Protein (g/day): 55-70 (1-1.25g/kg) Weight Used: (Current) 55.3 kg  Fluid (ml/day):   (1 ml/kcal)    Nutrition Diagnosis:   Inadequate oral intake related to altered GI function as evidenced by  (recent diet advancement following prolonged NPO/CLQ diet)    Nutrition Interventions:   Food and/or Nutrient Delivery: Continue Current Diet, Start Oral Nutrition Supplement     Coordination of Nutrition Care: Continue to monitor while inpatient, Interdisciplinary Rounds  Plan of Care discussed with: DEZ Chavez    Goals:   Previous Goal Met: Goal(s) Achieved  Active Goal: Meet at least 75% of estimated needs, by next RD assessment       Nutrition Monitoring and Evaluation:      Food/Nutrient Intake Outcomes: Diet Advancement/Tolerance, Food and Nutrient Intake, Supplement Intake  Physical Signs/Symptoms Outcomes: Meal Time Behavior, Weight    Discharge Planning:    Continue Oral Nutrition Supplement    Gerald Olmedo RD, LD  Contact: [phone]

## 2022-08-09 NOTE — DISCHARGE SUMMARY
Hospitalist Discharge Summary   Admit Date:  2022 11:25 AM   DC Note date: 2022  Name:  Sandra Leal   Age:  80 y.o. Sex:  female  :  1940   MRN:  [id number]   Room:  [facility]  PCP:  Kermit Platt MD    Presenting Complaint: Emesis     Initial Admission Diagnosis: Hypokalemia [E87.6]  Hyponatremia [E87.1]  Small bowel obstruction ([address] [K56.609]     Problem List for this Hospitalization (present on admission):    Principal Problem:    Small bowel obstruction ([address]  Active Problems:    OAB (overactive bladder)    Acute hypokalemia    Hyponatremia    Prerenal azotemia    Nausea and vomiting    Sinus tachycardia    Abnormal CT of the abdomen  Resolved Problems:    * No resolved hospital problems. [facility] Course:  Vinay Andrew (\"Laverle\") is a 68-year-old  female with history of tachycardia, cholecystectomy, total abdominal hysterectomy, & IBS w/ diarrhea who presented to the ED on  via EMS due to labs showing hyponatremia from Urgent Care w/ chief complaint of N/V described as bilious onset  & accompanying constipation. She had distention & a bloated feeling at presentation. She endorsed last BM  & denies passing flatus since that time, despite attempting a suppository prior to going to Urgent Care. She was seen in ED on  w/ chief complaint of increasing lower abdominal pain onset  w/ aforementioned N/V onset . She described pain as achy & sharp, moving from in her epigastric region to her lower abdomen. She took Zofran  w/o relief. She denied experiencing similar pain before. She had R lower abdominal TTP at that time. She was diagnosed with likely enteritis based on CT scan & d/c'd home w/ prescription for Zofran, as well as directions to f/u w/ PCP. Found in ED on  to have hypokalemia, hyponatremia, & SBO w/ transition point in the RLQ based on CTAP  & XR KUB  supporting bowel distention.  NG tube to [facility] placed. Admitted to hospital for SBO. Patient transferred to 7th floor from ED around 6:30 PM.     Reports Ih/o BS w/ diarrhea for \"many years\": States that has taken cholestyramine light most mornings for 20 years for diarrhea, but then not taken it days when feels constipated. States that usually just one day of constipation and no cholestyramine light and then diarrhea is back again so starts taking. Reports last colonoscopy was 4 years ago, during which polyps were removed, but denies any notification of concerning findings by PCP at [facility]. Colonoscopy also 05/18/10     Reports family history: mother had colorectal cancer found on routine colonoscopy & treated successfully with colon resection & 1 medication w/o return. Father had \"really bad\" diabetes & heart attack in his 66's. Pt continued supportive care during hospitalization with concurrent management by surgery Dr. Ashley sullivan.  Pt finally had a small bowel movement on 8/7/2022 despite ongoing findings of SBO on serial KUB. Pt did undergo a small bowel follow through on 8/8/2022 without findings of obstruction. Pt had multiple BMs on 8/8/2022 and NGT dc'd with clear liquid diet started by surgery. Pt tolerated oral intake well without further complaints of N/V and continued flatulence. Per surgery, pt ok for dc home. I further discussed with patient that she needs to f//u with GI outpatient given her questionable hx of IBS; pt was instructed to STOP cholestyramine until GI eval; furthermore pt advised AGAINST OTC imodium. Pt is medically stable for dc home today; she can f/u with PCP one week; her potassium was 2.5 this AM with normal mag levels which I suspect is from the contrast / multiple BMs yesterday. She was supplemented 40meq KCL now with ongoing 20meq bid KCL for the next 4 days. Pt was given a prescription for repeat BMP 8/12 and f/u with PCP one week post dc.     Ms Deepthi Coppola was also treated for an acute UTI beta hemolytic Group B strep; given her anaphylactic reaction in the past to penicillin, she was given a rx to complete 5-day course of zithromax. Pt remains afebrile without urinary symptoms past 36hrs. Plans for discharge home today. Disposition: Home  Diet: BARIATRIC DIET; Bariatric Full Liquid; 5 carb choices (75 gm/meal)  ADULT ORAL NUTRITION SUPPLEMENT; Breakfast, Lunch, Dinner; Standard High Calorie/High Protein Oral Supplement  Code Status: Full Code    Follow Ups:   Lencho Queen MD Follow up on 8/16/2022. Specialty: Internal Medicine  Why: at 2pm    Ask for New patient referral to GI for ? IBS / recurrent loose stooling  Contact information:  [address] [address]  [phone]                       Time spent in patient discharge and coordination 45 minutes. Follow up labs/diagnostics (ultimately defer to outpatient provider):  BMP one week    Plan was discussed with patient, RN, case mgmt. All questions answered. Patient was stable at time of discharge. Instructions given to call a physician or return if any concerns. Discharge Medication List as of 8/9/2022 12:36 PM        START taking these medications    Details   metoprolol tartrate (LOPRESSOR) 25 MG tablet Take 0.5 tablets by mouth in the morning and 0.5 tablets before bedtime. , Disp-30 tablet, R-0Print      azithromycin (ZITHROMAX) 500 MG tablet Take 1 tablet by mouth in the morning for 3 days. , Disp-3 tablet, R-0Print      potassium chloride (KLOR-CON M) 20 MEQ extended release tablet Take 1 tablet by mouth in the morning and 1 tablet before bedtime. Do all this for 4 days. , Disp-8 tablet, R-0Print           CONTINUE these medications which have NOT CHANGED    Details   ibuprofen (ADVIL;MOTRIN) 200 MG tablet Take 400 mg by mouth 3 times daily as neededHistorical Med      latanoprost (XALATAN) 0.005 % ophthalmic solution Place 1 drop into both eyes at bedtimeHistorical Med STOP taking these medications       cholestyramine light (PREVALITE) 4 GM/DOSE powder Comments:   Reason for Stopping:         SUMAtriptan (IMITREX) 50 MG tablet Comments:   Reason for Stopping:         HYDROcodone-acetaminophen 7.5-325 MG per 15ML solution Comments:   Reason for Stopping:         promethazine (PHENERGAN) 25 MG tablet Comments:   Reason for Stopping:               Procedures done this admission:  * No surgery found *    Consults this admission:  [address] UP VISIT    Echocardiogram results:  No results found for this or any previous visit. Diagnostic Imaging/Tests:   XR ABDOMEN (KUB) (SINGLE AP VIEW)    Result Date: 8/7/2022  1. Slight interval progression in small bowel obstruction. CPT code(s) 38359     XR ABDOMEN (KUB) (SINGLE AP VIEW)    Result Date: 8/6/2022  Slightly improved small bowel distention. XR ABDOMEN (KUB) (SINGLE AP VIEW)    Result Date: 8/5/2022  1. Oral contrast progression to the distal colon, excluding high-grade obstruction. 2.  Persistent dilated small bowel loops within the midabdomen. XR ABDOMEN (KUB) (SINGLE AP VIEW)    Result Date: 8/4/2022  RESIDUAL BARIUM IN THE PROXIMAL COLON FROM RECENT CT WOULD RENDER A SMALL BOWEL SERIES SUBOPTIMAL IF NOT NONDIAGNOSTIC, AND THEREFORE IT IS DEFERRED PENDING A COLON PREP. XR ABDOMEN (KUB) (SINGLE AP VIEW)    Result Date: 8/3/2022  NG tube tube tip is coiled once in the stomach with tip near the midline. XR ABDOMEN (KUB) (SINGLE AP VIEW)    Result Date: 8/3/2022  FINDINGS / IMPRESSION:  Persistent dilated small bowel loops. There is some contrast in the colon. NG tube is present. XR ABDOMEN (KUB) (SINGLE AP VIEW)    Result Date: 8/3/2022  1. Findings as described above. XR ABDOMEN (KUB) (SINGLE AP VIEW)    Result Date: 8/2/2022  Nasogastric tube proximal sidehole is at the gastroesophageal junction. Advancement by approximately 10 cm is suggested.     XR ACUTE ABD SERIES CHEST 1 VW    Result Date: 8/2/2022  1. Persistent gaseous distention of small bowel. 2.  No acute findings in the chest.    CT ABDOMEN PELVIS W IV CONTRAST Additional Contrast? Oral    Result Date: 8/2/2022  Small bowel obstruction with transition point in the right lower quadrant. CT ABDOMEN PELVIS W IV CONTRAST Additional Contrast? Oral    Result Date: 7/30/2022  1. Mild, nonspecific dilatation of its the small bowel loops with a small amount of fluid in the pelvic cul-de-sac status post hysterectomy. 2.  No evidence of appendicitis or other acute abdominopelvic abnormality, accounting for probable benign biliary ectasia status post cholecystectomy. FL SMALL BOWEL FOLLOW THROUGH ONLY    Result Date: 8/8/2022  Normal small bowel follow-through exam. No findings present to indicate small bowel obstruction. Total fluoroscopy time: 0.2 minutes; three fluoroscopic spot image saved. Recent Labs     08/05/22  1459   CULTURE 10,000 to 50,000 COLONIES/mL STREPTOCOCCI, BETA HEMOLYTIC GROUP B Group B Streptococci remain unviersally susceptibile to Penicilln, Ampicillin, and Cefazolin. Resistance to Clindamycin and Erythromycin can occur. Please contact the laboratory within 7 days of collection if susceptibility testing is needed.   *  <10,000 COLONIES/mL NORMAL SKIN FER ISOLATED       Labs: Results:       BMP, Mg, Phos Recent Labs     08/07/22  0507 08/08/22 0415 08/09/22  0851    137 140   K 2.9* 3.2* 2.5*    107 107   CO2 25 30 27   ANIONGAP 6* 0* 6*   BUN 3* 4* 4*   CREATININE 0.40* 0.50* 0.80   LABGLOM >60 >60 >60   GFRAA >60 >60 >60   CALCIUM 8.2* 8.3 8.1*   GLUCOSE 125* 103* 127*   MG 1.8  1.9  --  1.8   PHOS 2.0* 2.3  --       CBC Recent Labs     08/07/22  0507 08/08/22  0415   WBC 9.3 9.2   RBC 4.13 4.33   HGB 12.3 12.8   HCT 37.4 39.7   MCV 90.6 91.7   MCH 29.8 29.6   MCHC 32.9 32.2   RDW 12.5 12.7    266   MPV 9.0* 8.9*   NRBC 0.00 0.00      LFT No results for input(s): BILITOT, BILIDIR, ALKPHOS, AST, ALT, PROT, LABALBU, GLOB in the last 72 hours.    Cardiac  No results found for: NTPROBNP, TROPHS   Coags No results found for: PROTIME, INR, APTT   A1c No results found for: LABA1C, EAG   Lipids Lab Results   Component Value Date/Time    TRIG 85 08/08/2022 04:10 AM      Thyroid  No results found for: Bernell Meckel     Most Recent UA Lab Results   Component Value Date/Time    COLORU DARK YELLOW 08/02/2022 12:31 PM    APPEARANCE CLOUDY 08/02/2022 12:31 PM    SPECGRAV 1.023 08/02/2022 12:31 PM    LABPH 6.0 08/02/2022 12:31 PM    PROTEINU 100 08/02/2022 12:31 PM    GLUCOSEU Negative 08/02/2022 12:31 PM    KETUA 15 08/02/2022 12:31 PM    BILIRUBINUR Negative 08/02/2022 12:31 PM    BLOODU LARGE 08/02/2022 12:31 PM    UROBILINOGEN 0.2 08/02/2022 12:31 PM    NITRU Negative 08/02/2022 12:31 PM    LEUKOCYTESUR SMALL 08/02/2022 12:31 PM    WBCUA 0-3 08/02/2022 12:31 PM    RBCUA 20-50 08/02/2022 12:31 PM    EPITHUA 10-20 08/02/2022 12:31 PM    BACTERIA 2+ 08/02/2022 12:31 PM    LABCAST HYALINE 08/02/2022 12:31 PM    MUCUS 3+ 08/02/2022 12:31 PM          All Labs from Last 24 Hrs:  Recent Results (from the past 24 hour(s))   POCT Glucose    Collection Time: 08/08/22  4:22 PM   Result Value Ref Range    POC Glucose 179 (H) 65 - 100 mg/dL    Performed by: William Chilel    POCT Glucose    Collection Time: 08/09/22  6:07 AM   Result Value Ref Range    POC Glucose 109 (H) 65 - 100 mg/dL    Performed by: Sharee Alcaraz    Basic Metabolic Panel    Collection Time: 08/09/22  8:51 AM   Result Value Ref Range    Sodium 140 136 - 145 mmol/L    Potassium 2.5 (L) 3.5 - 5.1 mmol/L    Chloride 107 98 - 107 mmol/L    CO2 27 21 - 32 mmol/L    Anion Gap 6 (L) 7 - 16 mmol/L    Glucose 127 (H) 65 - 100 mg/dL    BUN 4 (L) 8 - 23 MG/DL    Creatinine 0.80 0.6 - 1.0 MG/DL    GFR African American >60 >60 ml/min/1.73m2    GFR Non- >60 >60 ml/min/1.73m2    Calcium 8.1 (L) 8.3 - 10.4 MG/DL   Magnesium Collection Time: 08/09/22  8:51 AM   Result Value Ref Range    Magnesium 1.8 1.8 - 2.4 mg/dL   POCT Glucose    Collection Time: 08/09/22 11:44 AM   Result Value Ref Range    POC Glucose 108 (H) 65 - 100 mg/dL    Performed by: Cruz Chen        Allergies   Allergen Reactions    Penicillins Anaphylaxis    Codeine Nausea And Vomiting    Hydrocodone Nausea And Vomiting     Immunization History   Administered Date(s) Administered    COVID-19, MODERNA BLUE border, Primary or Immunocompromised, (age 12y+), IM, 100 mcg/0.5mL 01/14/2021, 02/11/2021    Influenza Virus Vaccine 10/18/2017    Influenza, High Dose (Fluzone 65 yrs and older) 10/15/2014    Influenza, Trivalent Pf 10/07/2005, 11/02/2007, 11/10/2008, 09/14/2009, 09/30/2010, 10/04/2012, 11/06/2013    PPD Test 08/02/2022    Pneumococcal Conjugate 13-valent (Kabirhv52) 08/05/2015    Pneumococcal Polysaccharide (Jkkarengt21) 11/02/2007    Td (Adult), 5 Lf Tetanus Toxoid, Pf (Tenivac, Decavac) 08/06/2008    Zoster Live (Zostavax) 03/24/2008       Recent Vital Data:  Patient Vitals for the past 24 hrs:   Temp Pulse Resp BP SpO2   08/09/22 1143 98.4 °F (36.9 °C) 99 18 (!) 109/46 99 %   08/09/22 0746 97.5 °F (36.4 °C) 97 18 (!) 100/49 97 %   08/09/22 0341 98.2 °F (36.8 °C) 85 18 (!) 124/51 95 %   08/08/22 2223 98.6 °F (37 °C) 90 20 (!) 99/52 96 %   08/08/22 2027 -- 96 -- (!) 106/55 --   08/08/22 1936 98.4 °F (36.9 °C) 96 20 (!) 113/45 98 %   08/08/22 1622 98.2 °F (36.8 °C) (!) 121 19 (!) 118/54 96 %       Oxygen Therapy  SpO2: 99 %  Pulse via Oximetry: 101 beats per minute  Pulse Oximeter Device Mode: Continuous  O2 Device: None (Room air)    Estimated body mass index is 22.57 kg/m² as calculated from the following:    Height as of this encounter: 5' 2.4\" (1.585 m). Weight as of this encounter: 125 lb (56.7 kg). No intake or output data in the 24 hours ending 08/09/22 1452      Physical Exam:  General:          Well developed in NAD.     Head: Normocephalic, atraumatic  Eyes:               Sclerae appear normal.  Pupils equally round. ENT:                Nares appear normal, no drainage. Moist oral mucosa, NGT in place on exam  Neck:               No restricted ROM. Trachea midline   CV:                  RRR; no m/r/g. No jugular venous distension. Lungs:             CTAB. No wheezing, rhonchi, or rales. Symmetric expansion. Abdomen: +BSx4 no guarding, NT, nondistended  Extremities:     No cyanosis or clubbing. No edema  Skin:                No rashes and normal coloration. Warm and dry. Neuro:             CN II-XII grossly intact. Sensation intact. A&Ox3  Psych:             Normal mood and affect. Signed:  Kristopher Julian    Part of this note may have been written by using a voice dictation software. The note has been proof read but may still contain some grammatical/other typographical errors.

## 2022-08-10 NOTE — CARE COORDINATION
Patient medically ready for discharge today. New recommendations noted for Roman Dolan PT and RN.   Order for [place] placed and referral sent to [facility]; liaison notified of incoming referral.

## 2023-02-23 ENCOUNTER — OFFICE VISIT (OUTPATIENT)
Dept: FAMILY MEDICINE CLINIC | Facility: CLINIC | Age: 83
End: 2023-02-23
Payer: MEDICARE

## 2023-02-23 VITALS
OXYGEN SATURATION: 96 % | HEIGHT: 62 IN | DIASTOLIC BLOOD PRESSURE: 68 MMHG | HEART RATE: 86 BPM | BODY MASS INDEX: 21.53 KG/M2 | TEMPERATURE: 98.1 F | SYSTOLIC BLOOD PRESSURE: 110 MMHG | WEIGHT: 117 LBS

## 2023-02-23 DIAGNOSIS — N18.2 CKD (CHRONIC KIDNEY DISEASE) STAGE 2, GFR 60-89 ML/MIN: Primary | ICD-10-CM

## 2023-02-23 DIAGNOSIS — M25.511 CHRONIC RIGHT SHOULDER PAIN: ICD-10-CM

## 2023-02-23 DIAGNOSIS — G89.29 CHRONIC RIGHT SHOULDER PAIN: ICD-10-CM

## 2023-02-23 DIAGNOSIS — Z76.89 ENCOUNTER TO ESTABLISH CARE WITH NEW DOCTOR: ICD-10-CM

## 2023-02-23 PROBLEM — R00.0 SINUS TACHYCARDIA: Status: RESOLVED | Noted: 2022-08-02 | Resolved: 2023-02-23

## 2023-02-23 PROBLEM — E87.1 HYPONATREMIA: Status: RESOLVED | Noted: 2022-08-02 | Resolved: 2023-02-23

## 2023-02-23 PROBLEM — R11.2 NAUSEA AND VOMITING: Status: RESOLVED | Noted: 2022-08-02 | Resolved: 2023-02-23

## 2023-02-23 PROBLEM — E87.6 ACUTE HYPOKALEMIA: Status: RESOLVED | Noted: 2022-08-02 | Resolved: 2023-02-23

## 2023-02-23 PROBLEM — R79.89 PRERENAL AZOTEMIA: Status: RESOLVED | Noted: 2022-08-02 | Resolved: 2023-02-23

## 2023-02-23 PROCEDURE — 99203 OFFICE O/P NEW LOW 30 MIN: CPT | Performed by: STUDENT IN AN ORGANIZED HEALTH CARE EDUCATION/TRAINING PROGRAM

## 2023-02-23 PROCEDURE — 1090F PRES/ABSN URINE INCON ASSESS: CPT | Performed by: STUDENT IN AN ORGANIZED HEALTH CARE EDUCATION/TRAINING PROGRAM

## 2023-02-23 PROCEDURE — G8400 PT W/DXA NO RESULTS DOC: HCPCS | Performed by: STUDENT IN AN ORGANIZED HEALTH CARE EDUCATION/TRAINING PROGRAM

## 2023-02-23 PROCEDURE — 1123F ACP DISCUSS/DSCN MKR DOCD: CPT | Performed by: STUDENT IN AN ORGANIZED HEALTH CARE EDUCATION/TRAINING PROGRAM

## 2023-02-23 PROCEDURE — G8484 FLU IMMUNIZE NO ADMIN: HCPCS | Performed by: STUDENT IN AN ORGANIZED HEALTH CARE EDUCATION/TRAINING PROGRAM

## 2023-02-23 PROCEDURE — G8420 CALC BMI NORM PARAMETERS: HCPCS | Performed by: STUDENT IN AN ORGANIZED HEALTH CARE EDUCATION/TRAINING PROGRAM

## 2023-02-23 PROCEDURE — G8427 DOCREV CUR MEDS BY ELIG CLIN: HCPCS | Performed by: STUDENT IN AN ORGANIZED HEALTH CARE EDUCATION/TRAINING PROGRAM

## 2023-02-23 PROCEDURE — 4004F PT TOBACCO SCREEN RCVD TLK: CPT | Performed by: STUDENT IN AN ORGANIZED HEALTH CARE EDUCATION/TRAINING PROGRAM

## 2023-02-23 RX ORDER — SUMATRIPTAN 100 MG/1
100 TABLET, FILM COATED ORAL
COMMUNITY

## 2023-02-23 RX ORDER — MULTIVIT-MIN/IRON/FOLIC ACID/K 18-600-40
CAPSULE ORAL
COMMUNITY

## 2023-02-23 SDOH — ECONOMIC STABILITY: FOOD INSECURITY: WITHIN THE PAST 12 MONTHS, YOU WORRIED THAT YOUR FOOD WOULD RUN OUT BEFORE YOU GOT MONEY TO BUY MORE.: NEVER TRUE

## 2023-02-23 SDOH — ECONOMIC STABILITY: FOOD INSECURITY: WITHIN THE PAST 12 MONTHS, THE FOOD YOU BOUGHT JUST DIDN'T LAST AND YOU DIDN'T HAVE MONEY TO GET MORE.: NEVER TRUE

## 2023-02-23 SDOH — ECONOMIC STABILITY: INCOME INSECURITY: HOW HARD IS IT FOR YOU TO PAY FOR THE VERY BASICS LIKE FOOD, HOUSING, MEDICAL CARE, AND HEATING?: NOT HARD AT ALL

## 2023-02-23 SDOH — ECONOMIC STABILITY: HOUSING INSECURITY
IN THE LAST 12 MONTHS, WAS THERE A TIME WHEN YOU DID NOT HAVE A STEADY PLACE TO SLEEP OR SLEPT IN A SHELTER (INCLUDING NOW)?: NO

## 2023-02-23 ASSESSMENT — ANXIETY QUESTIONNAIRES
4. TROUBLE RELAXING: 0
6. BECOMING EASILY ANNOYED OR IRRITABLE: 0
5. BEING SO RESTLESS THAT IT IS HARD TO SIT STILL: 0
3. WORRYING TOO MUCH ABOUT DIFFERENT THINGS: 0
1. FEELING NERVOUS, ANXIOUS, OR ON EDGE: 0
2. NOT BEING ABLE TO STOP OR CONTROL WORRYING: 0
GAD7 TOTAL SCORE: 0
7. FEELING AFRAID AS IF SOMETHING AWFUL MIGHT HAPPEN: 0

## 2023-02-23 ASSESSMENT — PATIENT HEALTH QUESTIONNAIRE - PHQ9
1. LITTLE INTEREST OR PLEASURE IN DOING THINGS: 0
SUM OF ALL RESPONSES TO PHQ QUESTIONS 1-9: 0
SUM OF ALL RESPONSES TO PHQ QUESTIONS 1-9: 0
2. FEELING DOWN, DEPRESSED OR HOPELESS: 0
SUM OF ALL RESPONSES TO PHQ9 QUESTIONS 1 & 2: 0
SUM OF ALL RESPONSES TO PHQ QUESTIONS 1-9: 0
SUM OF ALL RESPONSES TO PHQ QUESTIONS 1-9: 0

## 2023-02-23 NOTE — PROGRESS NOTES
[facility]  Meenakshi Kasper  Phone [phone]  Fax:  [phone]    Patient: Anshul Dhaliwal  YOB: 1940  Patient Age [de-identified] y.o. Patient sex: female  Medical Record:  [id number]  Visit Date: 02/23/23    [facility] Note     Chief Complaint   Patient presents with    New Patient    Shoulder Pain     right       History of Present Illness:  44-year-old white female history of migraines presents to establish care. This the first time I am seeing this patient. Is currently following with [facility] internal medicine. Wants to stay with them and see us for acute care. I told her we will not see her if she if following with another practice. Patient understands. Follows with GI for GERD with esophagitis. Patient does not want labs today. Has no other questions or concerns at this time. Has a right shoulder injury with MRI pending on 3/2/2023. Cesar any chest pain, shortness of breath, abdominal pain, fever, chills, or any other symptoms at this time. Allergies: Allergies   Allergen Reactions    Penicillins Anaphylaxis    Codeine Nausea And Vomiting    Hydrocodone Nausea And Vomiting       Current Medications:   Medications marked \"taking\" at this time:    Current Outpatient Medications:     Cholecalciferol (VITAMIN D) 50 MCG (2000 UT) CAPS capsule, Take by mouth, Disp: , Rfl:     SUMAtriptan (IMITREX) 100 MG tablet, Take 100 mg by mouth once as needed for Migraine, Disp: , Rfl:     latanoprost (XALATAN) 0.005 % ophthalmic solution, Place 1 drop into both eyes at bedtime, Disp: , Rfl:     potassium chloride (KLOR-CON M) 20 MEQ extended release tablet, Take 1 tablet by mouth in the morning and 1 tablet before bedtime. Do all this for 4 days. , Disp: 8 tablet, Rfl: 0    Current Problem List:   Patient Active Problem List   Diagnosis    Right knee DJD    Small bowel obstruction (HCC)    OAB (overactive bladder)    Abnormal CT of the abdomen    CKD (chronic kidney disease) stage 2, GFR 60-89 ml/min    Chronic right shoulder pain       Social History:   Social History     Tobacco Use    Smoking status: Not on file    Smokeless tobacco: Not on file   Substance Use Topics    Alcohol use: Not on file       Family History:   Family History   Problem Relation Age of Onset    High Cholesterol Mother     Hypertension Father        Surgical History:  Past Surgical History:   Procedure Laterality Date    CHOLECYSTECTOMY      HYSTERECTOMY, TOTAL ABDOMINAL (CERVIX REMOVED)         ROS  Review of Systems   All other systems reviewed and are negative. Visit Vitals  /68   Pulse 86   Temp 98.1 °F (36.7 °C)   Ht 5' 2.4\" (1.585 m)   Wt 117 lb (53.1 kg)   SpO2 96%   BMI 21.13 kg/m²       Physical Exam  Physical Exam  Constitutional:       General: She is not in acute distress. Cardiovascular:      Rate and Rhythm: Normal rate and regular rhythm. Heart sounds: No murmur heard. Pulmonary:      Breath sounds: Normal breath sounds. No wheezing. Abdominal:      Palpations: Abdomen is soft. Tenderness: There is no abdominal tenderness. Musculoskeletal:         General: No signs of injury. Skin:     General: Skin is dry. Neurological:      Mental Status: She is alert. Mental status is at baseline. ASSESSMENT & PLAN  Edgar Echols was seen today for new patient and shoulder pain. Diagnoses and all orders for this visit:    CKD (chronic kidney disease) stage 2, GFR 60-89 ml/min    Right shoulder pain  - MRI pending 3/2/2023    Encounter to establish care with new doctor      I have reviewed the patient's past medical history, social history and family history and vitals. We have discussed treatment plan and follow up and given patient instructions. Patient's questions are answered and we will follow up as indicated.         Karen Brandon DO

## 2023-03-02 ENCOUNTER — HOSPITAL ENCOUNTER (OUTPATIENT)
Dept: MRI IMAGING | Age: 83
Discharge: HOME | End: 2023-03-02
Payer: MEDICARE

## 2023-03-02 DIAGNOSIS — M75.111 INCOMPLETE ROTATOR CUFF TEAR OR RUPTURE OF RIGHT SHOULDER, NOT SPECIFIED AS TRAUMATIC: ICD-10-CM

## 2023-03-02 PROCEDURE — 73221 MRI JOINT UPR EXTREM W/O DYE: CPT

## 2023-04-16 ENCOUNTER — ANESTHESIA EVENT (OUTPATIENT)
Dept: SURGERY | Age: 83
DRG: 483 | End: 2023-04-16
Payer: MEDICARE

## 2023-04-16 PROBLEM — M19.011 OSTEOARTHRITIS OF RIGHT SHOULDER: Chronic | Status: ACTIVE | Noted: 2023-04-16

## 2023-04-17 ENCOUNTER — ANESTHESIA (OUTPATIENT)
Dept: SURGERY | Age: 83
DRG: 483 | End: 2023-04-17
Payer: MEDICARE

## 2023-04-17 ENCOUNTER — APPOINTMENT (OUTPATIENT)
Dept: GENERAL RADIOLOGY | Age: 83
DRG: 483 | End: 2023-04-17
Attending: ORTHOPAEDIC SURGERY
Payer: MEDICARE

## 2023-04-17 ENCOUNTER — HOSPITAL ENCOUNTER (INPATIENT)
Age: 83
LOS: 1 days | Discharge: HOME | DRG: 483 | End: 2023-04-18
Attending: ORTHOPAEDIC SURGERY | Admitting: ORTHOPAEDIC SURGERY
Payer: MEDICARE

## 2023-04-17 PROBLEM — M19.011 OSTEOARTHRITIS OF RIGHT SHOULDER, UNSPECIFIED OSTEOARTHRITIS TYPE: Status: ACTIVE | Noted: 2023-04-17

## 2023-04-17 LAB
ABO + RH BLD: NORMAL
BLOOD GROUP ANTIBODIES SERPL: NORMAL
SPECIMEN EXP DATE BLD: NORMAL

## 2023-04-17 PROCEDURE — 1100000000 HC RM PRIVATE

## 2023-04-17 PROCEDURE — 6360000002 HC RX W HCPCS

## 2023-04-17 PROCEDURE — 3600000005 HC SURGERY LEVEL 5 BASE: Performed by: ORTHOPAEDIC SURGERY

## 2023-04-17 PROCEDURE — 3E0T3BZ INTRODUCTION OF ANESTHETIC AGENT INTO PERIPHERAL NERVES AND PLEXI, PERCUTANEOUS APPROACH: ICD-10-PCS | Performed by: ORTHOPAEDIC SURGERY

## 2023-04-17 PROCEDURE — 6370000000 HC RX 637 (ALT 250 FOR IP): Performed by: ORTHOPAEDIC SURGERY

## 2023-04-17 PROCEDURE — 2709999900 HC NON-CHARGEABLE SUPPLY: Performed by: ORTHOPAEDIC SURGERY

## 2023-04-17 PROCEDURE — 2500000003 HC RX 250 WO HCPCS

## 2023-04-17 PROCEDURE — 6360000002 HC RX W HCPCS: Performed by: ANESTHESIOLOGY

## 2023-04-17 PROCEDURE — C1776 JOINT DEVICE (IMPLANTABLE): HCPCS | Performed by: ORTHOPAEDIC SURGERY

## 2023-04-17 PROCEDURE — 7100000000 HC PACU RECOVERY - FIRST 15 MIN: Performed by: ORTHOPAEDIC SURGERY

## 2023-04-17 PROCEDURE — 3600000015 HC SURGERY LEVEL 5 ADDTL 15MIN: Performed by: ORTHOPAEDIC SURGERY

## 2023-04-17 PROCEDURE — 73030 X-RAY EXAM OF SHOULDER: CPT

## 2023-04-17 PROCEDURE — 6370000000 HC RX 637 (ALT 250 FOR IP): Performed by: ANESTHESIOLOGY

## 2023-04-17 PROCEDURE — 6360000002 HC RX W HCPCS: Performed by: ORTHOPAEDIC SURGERY

## 2023-04-17 PROCEDURE — 3700000000 HC ANESTHESIA ATTENDED CARE: Performed by: ORTHOPAEDIC SURGERY

## 2023-04-17 PROCEDURE — 2720000010 HC SURG SUPPLY STERILE: Performed by: ORTHOPAEDIC SURGERY

## 2023-04-17 PROCEDURE — C1713 ANCHOR/SCREW BN/BN,TIS/BN: HCPCS | Performed by: ORTHOPAEDIC SURGERY

## 2023-04-17 PROCEDURE — 7100000001 HC PACU RECOVERY - ADDTL 15 MIN: Performed by: ORTHOPAEDIC SURGERY

## 2023-04-17 PROCEDURE — 0RRJ00Z REPLACEMENT OF RIGHT SHOULDER JOINT WITH REVERSE BALL AND SOCKET SYNTHETIC SUBSTITUTE, OPEN APPROACH: ICD-10-PCS | Performed by: ORTHOPAEDIC SURGERY

## 2023-04-17 PROCEDURE — 2580000003 HC RX 258

## 2023-04-17 PROCEDURE — 2580000003 HC RX 258: Performed by: ORTHOPAEDIC SURGERY

## 2023-04-17 PROCEDURE — 3700000001 HC ADD 15 MINUTES (ANESTHESIA): Performed by: ORTHOPAEDIC SURGERY

## 2023-04-17 PROCEDURE — 86900 BLOOD TYPING SEROLOGIC ABO: CPT

## 2023-04-17 PROCEDURE — 2580000003 HC RX 258: Performed by: ANESTHESIOLOGY

## 2023-04-17 DEVICE — BEARING HUM STD 36 MM SHLDR VIVACIT-E PROLONG COMPHSVE: Type: IMPLANTABLE DEVICE | Site: SHOULDER | Status: FUNCTIONAL

## 2023-04-17 DEVICE — SCREW BNE L20MM DIA4.75MM HD DIA3.5MM CORT VAR ANG: Type: IMPLANTABLE DEVICE | Site: SHOULDER | Status: FUNCTIONAL

## 2023-04-17 DEVICE — IMPLANTABLE DEVICE: Type: IMPLANTABLE DEVICE | Site: SHOULDER | Status: FUNCTIONAL

## 2023-04-17 DEVICE — STEM HUM 11MM MINI SHLDR CO CHROM COMPHSVE REV PRI CEM: Type: IMPLANTABLE DEVICE | Site: SHOULDER | Status: FUNCTIONAL

## 2023-04-17 DEVICE — SCREW BNE L25MM DIA4.75MM HD DIA3.5MM CORT FIX ANG: Type: IMPLANTABLE DEVICE | Site: SHOULDER | Status: FUNCTIONAL

## 2023-04-17 DEVICE — SPHERE GLEN DIA36MM REG STD CO CHROM TAPR FIT FOR COMPHSVE: Type: IMPLANTABLE DEVICE | Site: SHOULDER | Status: FUNCTIONAL

## 2023-04-17 DEVICE — SCREW BNE L15MM DIA4.75MM HD DIA3.5MM CORT FIX ANG: Type: IMPLANTABLE DEVICE | Site: SHOULDER | Status: FUNCTIONAL

## 2023-04-17 DEVICE — SCREW BNE L25MM DIA6.5MM HEX HD DIA3.5MM FOR COMPHSVE CONV: Type: IMPLANTABLE DEVICE | Site: SHOULDER | Status: FUNCTIONAL

## 2023-04-17 RX ORDER — OXYCODONE HYDROCHLORIDE 5 MG/1
5 TABLET ORAL
Status: DISCONTINUED | OUTPATIENT
Start: 2023-04-17 | End: 2023-04-17 | Stop reason: HOSPADM

## 2023-04-17 RX ORDER — ONDANSETRON 2 MG/ML
4 INJECTION INTRAMUSCULAR; INTRAVENOUS EVERY 6 HOURS PRN
Status: DISCONTINUED | OUTPATIENT
Start: 2023-04-17 | End: 2023-04-18 | Stop reason: HOSPADM

## 2023-04-17 RX ORDER — SODIUM CHLORIDE, SODIUM LACTATE, POTASSIUM CHLORIDE, CALCIUM CHLORIDE 600; 310; 30; 20 MG/100ML; MG/100ML; MG/100ML; MG/100ML
INJECTION, SOLUTION INTRAVENOUS CONTINUOUS PRN
Status: DISCONTINUED | OUTPATIENT
Start: 2023-04-17 | End: 2023-04-17 | Stop reason: SDUPTHER

## 2023-04-17 RX ORDER — SODIUM CHLORIDE 0.9 % (FLUSH) 0.9 %
5-40 SYRINGE (ML) INJECTION PRN
Status: DISCONTINUED | OUTPATIENT
Start: 2023-04-17 | End: 2023-04-17 | Stop reason: HOSPADM

## 2023-04-17 RX ORDER — LATANOPROST 50 UG/ML
1 SOLUTION/ DROPS OPHTHALMIC NIGHTLY
Status: DISCONTINUED | OUTPATIENT
Start: 2023-04-17 | End: 2023-04-18 | Stop reason: HOSPADM

## 2023-04-17 RX ORDER — MELOXICAM 7.5 MG/1
3.75 TABLET ORAL DAILY
Status: DISCONTINUED | OUTPATIENT
Start: 2023-04-17 | End: 2023-04-18 | Stop reason: HOSPADM

## 2023-04-17 RX ORDER — ONDANSETRON 4 MG/1
4 TABLET, ORALLY DISINTEGRATING ORAL EVERY 8 HOURS PRN
Status: DISCONTINUED | OUTPATIENT
Start: 2023-04-17 | End: 2023-04-18 | Stop reason: HOSPADM

## 2023-04-17 RX ORDER — SODIUM CHLORIDE 0.9 % (FLUSH) 0.9 %
5-40 SYRINGE (ML) INJECTION PRN
Status: DISCONTINUED | OUTPATIENT
Start: 2023-04-17 | End: 2023-04-18 | Stop reason: HOSPADM

## 2023-04-17 RX ORDER — SODIUM CHLORIDE 0.9 % (FLUSH) 0.9 %
5-40 SYRINGE (ML) INJECTION EVERY 12 HOURS SCHEDULED
Status: DISCONTINUED | OUTPATIENT
Start: 2023-04-17 | End: 2023-04-17 | Stop reason: HOSPADM

## 2023-04-17 RX ORDER — ONDANSETRON 2 MG/ML
INJECTION INTRAMUSCULAR; INTRAVENOUS PRN
Status: DISCONTINUED | OUTPATIENT
Start: 2023-04-17 | End: 2023-04-17 | Stop reason: SDUPTHER

## 2023-04-17 RX ORDER — PROPOFOL 10 MG/ML
INJECTION, EMULSION INTRAVENOUS PRN
Status: DISCONTINUED | OUTPATIENT
Start: 2023-04-17 | End: 2023-04-17 | Stop reason: SDUPTHER

## 2023-04-17 RX ORDER — HYDROMORPHONE HYDROCHLORIDE 2 MG/ML
0.5 INJECTION, SOLUTION INTRAMUSCULAR; INTRAVENOUS; SUBCUTANEOUS EVERY 10 MIN PRN
Status: DISCONTINUED | OUTPATIENT
Start: 2023-04-17 | End: 2023-04-17 | Stop reason: HOSPADM

## 2023-04-17 RX ORDER — IPRATROPIUM BROMIDE AND ALBUTEROL SULFATE 2.5; .5 MG/3ML; MG/3ML
1 SOLUTION RESPIRATORY (INHALATION)
Status: DISCONTINUED | OUTPATIENT
Start: 2023-04-17 | End: 2023-04-17 | Stop reason: HOSPADM

## 2023-04-17 RX ORDER — GLYCOPYRROLATE 0.2 MG/ML
INJECTION INTRAMUSCULAR; INTRAVENOUS PRN
Status: DISCONTINUED | OUTPATIENT
Start: 2023-04-17 | End: 2023-04-17 | Stop reason: SDUPTHER

## 2023-04-17 RX ORDER — SODIUM CHLORIDE, SODIUM LACTATE, POTASSIUM CHLORIDE, CALCIUM CHLORIDE 600; 310; 30; 20 MG/100ML; MG/100ML; MG/100ML; MG/100ML
INJECTION, SOLUTION INTRAVENOUS CONTINUOUS
Status: DISCONTINUED | OUTPATIENT
Start: 2023-04-17 | End: 2023-04-18 | Stop reason: HOSPADM

## 2023-04-17 RX ORDER — VANCOMYCIN HYDROCHLORIDE 1 G/20ML
INJECTION, POWDER, LYOPHILIZED, FOR SOLUTION INTRAVENOUS PRN
Status: DISCONTINUED | OUTPATIENT
Start: 2023-04-17 | End: 2023-04-17 | Stop reason: HOSPADM

## 2023-04-17 RX ORDER — SODIUM CHLORIDE 9 MG/ML
INJECTION, SOLUTION INTRAVENOUS PRN
Status: DISCONTINUED | OUTPATIENT
Start: 2023-04-17 | End: 2023-04-18 | Stop reason: HOSPADM

## 2023-04-17 RX ORDER — SODIUM CHLORIDE, SODIUM LACTATE, POTASSIUM CHLORIDE, CALCIUM CHLORIDE 600; 310; 30; 20 MG/100ML; MG/100ML; MG/100ML; MG/100ML
INJECTION, SOLUTION INTRAVENOUS CONTINUOUS
Status: DISCONTINUED | OUTPATIENT
Start: 2023-04-17 | End: 2023-04-17 | Stop reason: HOSPADM

## 2023-04-17 RX ORDER — NEOSTIGMINE METHYLSULFATE 1 MG/ML
INJECTION, SOLUTION INTRAVENOUS PRN
Status: DISCONTINUED | OUTPATIENT
Start: 2023-04-17 | End: 2023-04-17 | Stop reason: SDUPTHER

## 2023-04-17 RX ORDER — PHENYLEPHRINE HYDROCHLORIDE 10 MG/ML
INJECTION INTRAVENOUS PRN
Status: DISCONTINUED | OUTPATIENT
Start: 2023-04-17 | End: 2023-04-17 | Stop reason: SDUPTHER

## 2023-04-17 RX ORDER — ACETAMINOPHEN 500 MG
1000 TABLET ORAL ONCE
Status: COMPLETED | OUTPATIENT
Start: 2023-04-17 | End: 2023-04-17

## 2023-04-17 RX ORDER — SODIUM CHLORIDE 0.9 % (FLUSH) 0.9 %
5-40 SYRINGE (ML) INJECTION EVERY 12 HOURS SCHEDULED
Status: DISCONTINUED | OUTPATIENT
Start: 2023-04-17 | End: 2023-04-18 | Stop reason: HOSPADM

## 2023-04-17 RX ORDER — APREPITANT 40 MG/1
40 CAPSULE ORAL ONCE
Status: COMPLETED | OUTPATIENT
Start: 2023-04-17 | End: 2023-04-17

## 2023-04-17 RX ORDER — ONDANSETRON 2 MG/ML
4 INJECTION INTRAMUSCULAR; INTRAVENOUS
Status: DISCONTINUED | OUTPATIENT
Start: 2023-04-17 | End: 2023-04-17 | Stop reason: HOSPADM

## 2023-04-17 RX ORDER — MIDAZOLAM HYDROCHLORIDE 2 MG/2ML
2 INJECTION, SOLUTION INTRAMUSCULAR; INTRAVENOUS
Status: COMPLETED | OUTPATIENT
Start: 2023-04-17 | End: 2023-04-17

## 2023-04-17 RX ORDER — EPHEDRINE SULFATE/0.9% NACL/PF 50 MG/5 ML
SYRINGE (ML) INTRAVENOUS PRN
Status: DISCONTINUED | OUTPATIENT
Start: 2023-04-17 | End: 2023-04-17 | Stop reason: SDUPTHER

## 2023-04-17 RX ORDER — PROPOFOL 10 MG/ML
INJECTION, EMULSION INTRAVENOUS CONTINUOUS PRN
Status: DISCONTINUED | OUTPATIENT
Start: 2023-04-17 | End: 2023-04-17 | Stop reason: SDUPTHER

## 2023-04-17 RX ORDER — LIDOCAINE HYDROCHLORIDE 20 MG/ML
INJECTION, SOLUTION EPIDURAL; INFILTRATION; INTRACAUDAL; PERINEURAL PRN
Status: DISCONTINUED | OUTPATIENT
Start: 2023-04-17 | End: 2023-04-17 | Stop reason: SDUPTHER

## 2023-04-17 RX ORDER — ACETAMINOPHEN 325 MG/1
650 TABLET ORAL EVERY 6 HOURS
Status: DISCONTINUED | OUTPATIENT
Start: 2023-04-17 | End: 2023-04-18 | Stop reason: HOSPADM

## 2023-04-17 RX ORDER — TRANEXAMIC ACID 100 MG/ML
INJECTION, SOLUTION INTRAVENOUS PRN
Status: DISCONTINUED | OUTPATIENT
Start: 2023-04-17 | End: 2023-04-17 | Stop reason: SDUPTHER

## 2023-04-17 RX ORDER — FENTANYL CITRATE 50 UG/ML
50 INJECTION, SOLUTION INTRAMUSCULAR; INTRAVENOUS EVERY 5 MIN PRN
Status: DISCONTINUED | OUTPATIENT
Start: 2023-04-17 | End: 2023-04-17 | Stop reason: HOSPADM

## 2023-04-17 RX ORDER — DEXAMETHASONE SODIUM PHOSPHATE 10 MG/ML
INJECTION INTRAMUSCULAR; INTRAVENOUS PRN
Status: DISCONTINUED | OUTPATIENT
Start: 2023-04-17 | End: 2023-04-17 | Stop reason: SDUPTHER

## 2023-04-17 RX ORDER — TRAMADOL HYDROCHLORIDE 50 MG/1
100 TABLET ORAL EVERY 6 HOURS PRN
Status: DISCONTINUED | OUTPATIENT
Start: 2023-04-17 | End: 2023-04-18 | Stop reason: HOSPADM

## 2023-04-17 RX ORDER — LIDOCAINE HYDROCHLORIDE 10 MG/ML
1 INJECTION, SOLUTION INFILTRATION; PERINEURAL
Status: DISCONTINUED | OUTPATIENT
Start: 2023-04-17 | End: 2023-04-17 | Stop reason: HOSPADM

## 2023-04-17 RX ORDER — ROCURONIUM BROMIDE 10 MG/ML
INJECTION, SOLUTION INTRAVENOUS PRN
Status: DISCONTINUED | OUTPATIENT
Start: 2023-04-17 | End: 2023-04-17 | Stop reason: SDUPTHER

## 2023-04-17 RX ORDER — HALOPERIDOL 5 MG/ML
1 INJECTION INTRAMUSCULAR
Status: DISCONTINUED | OUTPATIENT
Start: 2023-04-17 | End: 2023-04-17 | Stop reason: HOSPADM

## 2023-04-17 RX ORDER — HYDROMORPHONE HYDROCHLORIDE 1 MG/ML
1 INJECTION, SOLUTION INTRAMUSCULAR; INTRAVENOUS; SUBCUTANEOUS
Status: DISCONTINUED | OUTPATIENT
Start: 2023-04-17 | End: 2023-04-18 | Stop reason: HOSPADM

## 2023-04-17 RX ORDER — FENTANYL CITRATE 50 UG/ML
INJECTION, SOLUTION INTRAMUSCULAR; INTRAVENOUS PRN
Status: DISCONTINUED | OUTPATIENT
Start: 2023-04-17 | End: 2023-04-17 | Stop reason: SDUPTHER

## 2023-04-17 RX ORDER — ASPIRIN 81 MG/1
81 TABLET ORAL 2 TIMES DAILY
Status: DISCONTINUED | OUTPATIENT
Start: 2023-04-17 | End: 2023-04-18 | Stop reason: HOSPADM

## 2023-04-17 RX ORDER — TRAMADOL HYDROCHLORIDE 50 MG/1
50 TABLET ORAL EVERY 6 HOURS PRN
Status: DISCONTINUED | OUTPATIENT
Start: 2023-04-17 | End: 2023-04-18 | Stop reason: HOSPADM

## 2023-04-17 RX ADMIN — CEFAZOLIN SODIUM 2000 MG: 100 INJECTION, POWDER, LYOPHILIZED, FOR SOLUTION INTRAVENOUS at 18:34

## 2023-04-17 RX ADMIN — ACETAMINOPHEN 650 MG: 325 TABLET ORAL at 17:04

## 2023-04-17 RX ADMIN — Medication 10 MG: at 11:10

## 2023-04-17 RX ADMIN — ACETAMINOPHEN 1000 MG: 500 TABLET, FILM COATED ORAL at 09:30

## 2023-04-17 RX ADMIN — FENTANYL CITRATE 50 MCG: 50 INJECTION, SOLUTION INTRAMUSCULAR; INTRAVENOUS at 10:37

## 2023-04-17 RX ADMIN — SODIUM CHLORIDE, POTASSIUM CHLORIDE, SODIUM LACTATE AND CALCIUM CHLORIDE: 600; 310; 30; 20 INJECTION, SOLUTION INTRAVENOUS at 17:01

## 2023-04-17 RX ADMIN — MELOXICAM 3.75 MG: 7.5 TABLET ORAL at 17:04

## 2023-04-17 RX ADMIN — Medication 2000 MG: at 10:49

## 2023-04-17 RX ADMIN — PHENYLEPHRINE HYDROCHLORIDE 100 MCG: 10 INJECTION INTRAVENOUS at 10:45

## 2023-04-17 RX ADMIN — SODIUM CHLORIDE, POTASSIUM CHLORIDE, SODIUM LACTATE AND CALCIUM CHLORIDE: 600; 310; 30; 20 INJECTION, SOLUTION INTRAVENOUS at 09:27

## 2023-04-17 RX ADMIN — PROPOFOL 25 MG: 10 INJECTION, EMULSION INTRAVENOUS at 10:46

## 2023-04-17 RX ADMIN — Medication 3 AMPULE: at 10:11

## 2023-04-17 RX ADMIN — TRANEXAMIC ACID 1000 MG: 100 INJECTION, SOLUTION INTRAVENOUS at 12:45

## 2023-04-17 RX ADMIN — ROCURONIUM BROMIDE 40 MG: 50 INJECTION, SOLUTION INTRAVENOUS at 10:39

## 2023-04-17 RX ADMIN — ASPIRIN 81 MG: 81 TABLET ORAL at 21:06

## 2023-04-17 RX ADMIN — DEXAMETHASONE SODIUM PHOSPHATE 10 MG: 10 INJECTION INTRAMUSCULAR; INTRAVENOUS at 10:49

## 2023-04-17 RX ADMIN — TRAMADOL HYDROCHLORIDE 50 MG: 50 TABLET, COATED ORAL at 21:09

## 2023-04-17 RX ADMIN — SODIUM CHLORIDE, SODIUM LACTATE, POTASSIUM CHLORIDE, AND CALCIUM CHLORIDE: 600; 310; 30; 20 INJECTION, SOLUTION INTRAVENOUS at 10:55

## 2023-04-17 RX ADMIN — LIDOCAINE HYDROCHLORIDE 80 MG: 20 INJECTION, SOLUTION EPIDURAL; INFILTRATION; INTRACAUDAL; PERINEURAL at 10:37

## 2023-04-17 RX ADMIN — PROPOFOL 100 MCG/KG/MIN: 10 INJECTION, EMULSION INTRAVENOUS at 11:17

## 2023-04-17 RX ADMIN — SODIUM CHLORIDE, PRESERVATIVE FREE 10 ML: 5 INJECTION INTRAVENOUS at 21:06

## 2023-04-17 RX ADMIN — PROPOFOL 150 MG: 10 INJECTION, EMULSION INTRAVENOUS at 10:37

## 2023-04-17 RX ADMIN — MIDAZOLAM 2 MG: 1 INJECTION INTRAMUSCULAR; INTRAVENOUS at 09:48

## 2023-04-17 RX ADMIN — PHENYLEPHRINE HYDROCHLORIDE 30 MCG/MIN: 10 INJECTION INTRAVENOUS at 11:14

## 2023-04-17 RX ADMIN — FENTANYL CITRATE 50 MCG: 50 INJECTION, SOLUTION INTRAMUSCULAR; INTRAVENOUS at 10:34

## 2023-04-17 RX ADMIN — PHENYLEPHRINE HYDROCHLORIDE 200 MCG: 10 INJECTION INTRAVENOUS at 10:59

## 2023-04-17 RX ADMIN — Medication 1 MG: at 12:54

## 2023-04-17 RX ADMIN — APREPITANT 40 MG: 40 CAPSULE ORAL at 09:30

## 2023-04-17 RX ADMIN — PHENYLEPHRINE HYDROCHLORIDE 200 MCG: 10 INJECTION INTRAVENOUS at 10:41

## 2023-04-17 RX ADMIN — TRANEXAMIC ACID 2000 MG: 100 INJECTION, SOLUTION INTRAVENOUS at 10:50

## 2023-04-17 RX ADMIN — ONDANSETRON 4 MG: 4 TABLET, ORALLY DISINTEGRATING ORAL at 21:09

## 2023-04-17 RX ADMIN — ONDANSETRON 4 MG: 2 INJECTION INTRAMUSCULAR; INTRAVENOUS at 12:54

## 2023-04-17 RX ADMIN — GLYCOPYRROLATE 0.2 MG: 0.2 INJECTION INTRAMUSCULAR; INTRAVENOUS at 12:54

## 2023-04-17 RX ADMIN — PHENYLEPHRINE HYDROCHLORIDE 100 MCG: 10 INJECTION INTRAVENOUS at 11:10

## 2023-04-17 ASSESSMENT — PAIN DESCRIPTION - ONSET: ONSET: GRADUAL

## 2023-04-17 ASSESSMENT — PAIN DESCRIPTION - ORIENTATION: ORIENTATION: RIGHT

## 2023-04-17 ASSESSMENT — PAIN DESCRIPTION - DESCRIPTORS: DESCRIPTORS: SORE

## 2023-04-17 ASSESSMENT — PAIN SCALES - GENERAL
PAINLEVEL_OUTOF10: 5
PAINLEVEL_OUTOF10: 0
PAINLEVEL_OUTOF10: 0

## 2023-04-17 ASSESSMENT — PAIN DESCRIPTION - FREQUENCY: FREQUENCY: INTERMITTENT

## 2023-04-17 ASSESSMENT — PAIN - FUNCTIONAL ASSESSMENT
PAIN_FUNCTIONAL_ASSESSMENT: ACTIVITIES ARE NOT PREVENTED
PAIN_FUNCTIONAL_ASSESSMENT: NONE - DENIES PAIN
PAIN_FUNCTIONAL_ASSESSMENT: 0-10
PAIN_FUNCTIONAL_ASSESSMENT: 0-10

## 2023-04-17 ASSESSMENT — PAIN DESCRIPTION - PAIN TYPE: TYPE: SURGICAL PAIN

## 2023-04-17 ASSESSMENT — PAIN DESCRIPTION - LOCATION: LOCATION: ARM

## 2023-04-17 NOTE — H&P
History and Physical Updated with no interval change. Christal Flores MD History and Physical Updated with no interval change.  Christal Flores MD

## 2023-04-17 NOTE — PERIOP NOTE
TRANSFER - OUT REPORT:    Verbal report given to JESICA Schmidt on [employer]  being transferred to [facility] 50 30 for routine post-op       Report consisted of patients Situation, Background, Assessment and   Recommendations(SBAR). Information from the following report(s) Adult Overview, Surgery Report, Intake/Output, MAR, Recent Results, Med Rec Status, and Cardiac Rhythm NS  was reviewed with the receiving nurse. Lines:   Peripheral IV 04/17/23 Distal;Left Forearm (Active)   Site Assessment Clean, dry & intact 04/17/23 1323   Line Status Infusing 04/17/23 [address] Connections checked and tightened 04/17/23 0919   Phlebitis Assessment No symptoms 04/17/23 1323   Infiltration Assessment 0 04/17/23 1323   Alcohol Cap Used No 04/17/23 0919   Dressing Status Clean, dry & intact 04/17/23 1323   Dressing Type Transparent 04/17/23 1323   Dressing Intervention New 04/17/23 0919       Peripheral IV 04/17/23 Left Antecubital (Active)   Site Assessment Clean, dry & intact 04/17/23 1323   Line Status Infusing 04/17/23 1323   Phlebitis Assessment No symptoms 04/17/23 1323   Infiltration Assessment 0 04/17/23 1323   Dressing Status Clean, dry & intact 04/17/23 1323   Dressing Type Transparent 04/17/23 1323        Opportunity for questions and clarification was provided. Patient transported with:   O2 @ 0 liters    VTE prophylaxis orders have not been written for [place]. Patient and family given floor number and nurses name. Family updated re: pt status after security code verified.

## 2023-04-17 NOTE — ANESTHESIA PRE PROCEDURE
 sodium chloride flush 0.9 % injection 5-40 mL  5-40 mL IntraVENous 2 times per day Milton Solis MD        sodium chloride flush 0.9 % injection 5-40 mL  5-40 mL IntraVENous PRN Milton Solis MD        midazolam PF (VERSED) injection 2 mg  2 mg IntraVENous Once PRN Milton Solis MD        ipratropium-albuterol (DUONEB) nebulizer solution 1 ampule  1 ampule Inhalation Once PRN Milton Solis MD        aprepitant (EMEND) capsule 40 mg  40 mg Oral Once Milton Solis MD           Allergies:     Allergies   Allergen Reactions    Penicillins Anaphylaxis    Codeine Nausea And Vomiting    Hydrocodone Nausea And Vomiting       Problem List:    Patient Active Problem List   Diagnosis Code    Right knee DJD M17.11    Small bowel obstruction (HCC) K56.609    OAB (overactive bladder) N32.81    Abnormal CT of the abdomen R93.5    CKD (chronic kidney disease) stage 2, GFR 60-89 ml/min N18.2    Chronic right shoulder pain M25.511, G89.29    Osteoarthritis of right shoulder M19.011    Osteoarthritis of right shoulder, unspecified osteoarthritis type M19.011       Past Medical History:        Diagnosis Date    CKD (chronic kidney disease) stage 2, GFR 60-89 ml/min     Colonel Sanchez, DO    DDD (degenerative disc disease), cervical     GERD (gastroesophageal reflux disease)     Glaucoma     History of colon cancer     History of colon polyps     Hypercholesteremia     IBS (irritable bowel syndrome)     Migraines     OAB (overactive bladder)     Osteoarthritis     PONV (postoperative nausea and vomiting)        Past Surgical History:        Procedure Laterality Date    CATARACT REMOVAL Bilateral     CHOLECYSTECTOMY      COLONOSCOPY  2018    HYSTERECTOMY, TOTAL ABDOMINAL (CERVIX REMOVED)      KNEE ARTHROPLASTY Bilateral 10/15/2018    TENDON TRANSFER Left 05/2016    LEFT THUMB EXTENSOR TENDON TRANSFER       Social History:    Social History     Tobacco Use    Smoking status: Never   

## 2023-04-17 NOTE — ANESTHESIA PROCEDURE NOTES
Airway  Date/Time: 4/17/2023 10:43 AM  Urgency: elective    Airway not difficult    General Information and Staff    Patient location during procedure: OR  Resident/CRNA: ENRIQUE Quintana - CRNA  Performed: resident/CRNA     Indications and Patient Condition  Indications for airway management: anesthesia  Spontaneous Ventilation: absent  Sedation level: deep  Preoxygenated: no  Patient position: sniffing  MILS not maintained throughout  Mask difficulty assessment: vent by bag mask    Final Airway Details  Final airway type: endotracheal airway      Successful airway: ETT  Cuffed: yes   Successful intubation technique: direct laryngoscopy  Facilitating devices/methods: intubating stylet  Endotracheal tube insertion site: oral  Blade: Lacey  Blade size: #3  ETT size (mm): 7.0  Cormack-Lehane Classification: grade I - full view of glottis  Placement verified by: chest auscultation and capnometry   Inital cuff pressure (cm H2O): 7  Measured from: teeth  ETT to teeth (cm): 21  Number of attempts at approach: 1  Ventilation between attempts: bag mask  Number of other approaches attempted: 0    Additional Comments  PreO2 > 3 minutes. Standard IV induction by MDA. Atraumatic insertion by paramedic student, Rishabh Engel. Positive equal and bilateral breath sounds noted with positive ETCO2 present. Lips and dentition unchanged from pre-op.    no

## 2023-04-17 NOTE — BRIEF OP NOTE
[employer]- [id number] Right 1 Implanted   STEM HUM 11MM MINI SHLDR CO CHROM COMPHSVE REV MERY THOMAS - [id number]  STEM HUM 11MM MINI SHLDR CO CHROM COMPHSVE REV MERY THOMAS  [employer]-WD [id number] Right 1 Implanted   TRAY HUM STD FOR COMPHSVE REV SHLDR SYS - [id number]  TRAY HUM STD FOR COMPHSVE REV SHLDR SYS  FREDIS [employer] ETEX KALPANA-WD [id number] Right 1 Implanted   BEARING HUM STD 36 MM SHLDR VIVACIT-E PROLONG COMPHSVE - [id number]  BEARING HUM STD 36 MM SHLDR VIVACIT-E PROLONG COMPHSVE  [employer]- [id number] Right 1 Implanted         Drains: * No LDAs found *    Findings: rotator cuff arhtropathy      Electronically signed by Zhou Merchant MD on 4/17/2023 at 1:13 PM

## 2023-04-17 NOTE — ANESTHESIA POSTPROCEDURE EVALUATION
Department of Anesthesiology  Postprocedure Note    Patient: Yesenia Damico  MRN: [id number]  YOB: 1940  Date of evaluation: 4/17/2023      Procedure Summary     Date: 04/17/23 Room / Location: [facility] MAIN OR  / [facility] MAIN OR    Anesthesia Start: 1020 Anesthesia Stop: 6782    Procedure: RIGHT SHOULDER TOTAL ARTHROPLASTY REVERSE (Right: Shoulder) Diagnosis:       Primary osteoarthritis of right shoulder      Tear of right rotator cuff, unspecified tear extent, unspecified whether traumatic      (Primary osteoarthritis of right shoulder [M19.011])      (Tear of right rotator cuff, unspecified tear extent, unspecified whether traumatic [M75.101])    Providers: Merle Nath MD Responsible Provider: Yessi Joiner DO    Anesthesia Type: General ASA Status: 2          Anesthesia Type: General    Natasha Phase I: Natasha Score: 10    Natasha Phase II:        Anesthesia Post Evaluation    Patient location during evaluation: PACU  Level of consciousness: awake and alert  Airway patency: patent  Nausea & Vomiting: no nausea  Complications: no  Cardiovascular status: hemodynamically stable  Respiratory status: acceptable  Hydration status: euvolemic

## 2023-04-17 NOTE — OP NOTE
even dislocate. Once this was done, we thoroughly irrigated, placed in our permanent short taper stem and our standard tray and poly. Reduced this. Again, very stable through all ranges of motion. We irrigated it once again with a total of 3 liters of Pulsavac lavage. We closed the subscap very loosely to its insertion and tied the biceps tendon into this very thickened fibrotic anterior. We did not tie this down, tied it at all, did not try to tie it up superiorly. We irrigated it once again. We then closed the fascial layer, placed in 1 g of vancomycin and then a container of Cellerate in this layer. Closed deeply with 0 Vicryl, 2-0 Vicryl, and clips on the skin. She did receive one more further gram of TXA on closure for a total of 3 g; two at the beginning, one at the end of TXA. She tolerated the procedure well. She will be admitted to our service.       Jesus Rockwell MD      [id number]  D:  04/17/2023 14:13  T:  04/17/2023 16:18  JOB #:  [id number]

## 2023-04-18 VITALS
TEMPERATURE: 98.1 F | WEIGHT: 118 LBS | BODY MASS INDEX: 21.71 KG/M2 | HEIGHT: 62 IN | RESPIRATION RATE: 18 BRPM | OXYGEN SATURATION: 98 % | DIASTOLIC BLOOD PRESSURE: 45 MMHG | SYSTOLIC BLOOD PRESSURE: 117 MMHG | HEART RATE: 83 BPM

## 2023-04-18 LAB
HCT VFR BLD AUTO: 34.5 % (ref 35.8–46.3)
HGB BLD-MCNC: 11.1 G/DL (ref 11.7–15.4)

## 2023-04-18 PROCEDURE — 2580000003 HC RX 258: Performed by: ORTHOPAEDIC SURGERY

## 2023-04-18 PROCEDURE — 97530 THERAPEUTIC ACTIVITIES: CPT

## 2023-04-18 PROCEDURE — 6370000000 HC RX 637 (ALT 250 FOR IP): Performed by: ORTHOPAEDIC SURGERY

## 2023-04-18 PROCEDURE — 97161 PT EVAL LOW COMPLEX 20 MIN: CPT

## 2023-04-18 PROCEDURE — 97165 OT EVAL LOW COMPLEX 30 MIN: CPT

## 2023-04-18 PROCEDURE — 36415 COLL VENOUS BLD VENIPUNCTURE: CPT

## 2023-04-18 PROCEDURE — 85014 HEMATOCRIT: CPT

## 2023-04-18 PROCEDURE — 85018 HEMOGLOBIN: CPT

## 2023-04-18 PROCEDURE — 97535 SELF CARE MNGMENT TRAINING: CPT

## 2023-04-18 PROCEDURE — 6360000002 HC RX W HCPCS: Performed by: ORTHOPAEDIC SURGERY

## 2023-04-18 RX ADMIN — ONDANSETRON 4 MG: 4 TABLET, ORALLY DISINTEGRATING ORAL at 09:54

## 2023-04-18 RX ADMIN — CEFAZOLIN SODIUM 2000 MG: 100 INJECTION, POWDER, LYOPHILIZED, FOR SOLUTION INTRAVENOUS at 03:16

## 2023-04-18 RX ADMIN — ACETAMINOPHEN 650 MG: 325 TABLET ORAL at 10:19

## 2023-04-18 RX ADMIN — TRAMADOL HYDROCHLORIDE 50 MG: 50 TABLET, COATED ORAL at 13:04

## 2023-04-18 RX ADMIN — MELOXICAM 3.75 MG: 7.5 TABLET ORAL at 09:50

## 2023-04-18 RX ADMIN — ASPIRIN 81 MG: 81 TABLET ORAL at 09:50

## 2023-04-18 RX ADMIN — ACETAMINOPHEN 650 MG: 325 TABLET ORAL at 05:57

## 2023-04-18 RX ADMIN — SODIUM CHLORIDE, PRESERVATIVE FREE 5 ML: 5 INJECTION INTRAVENOUS at 09:54

## 2023-04-18 ASSESSMENT — PAIN SCALES - GENERAL
PAINLEVEL_OUTOF10: 5
PAINLEVEL_OUTOF10: 2
PAINLEVEL_OUTOF10: 2

## 2023-04-18 ASSESSMENT — PAIN DESCRIPTION - PAIN TYPE: TYPE: ACUTE PAIN;SURGICAL PAIN

## 2023-04-18 ASSESSMENT — PAIN DESCRIPTION - DESCRIPTORS
DESCRIPTORS: ACHING;DISCOMFORT
DESCRIPTORS: ACHING;DISCOMFORT

## 2023-04-18 ASSESSMENT — PAIN DESCRIPTION - ORIENTATION
ORIENTATION: RIGHT
ORIENTATION: RIGHT

## 2023-04-18 ASSESSMENT — PAIN DESCRIPTION - LOCATION
LOCATION: SHOULDER
LOCATION: SHOULDER

## 2023-04-18 ASSESSMENT — PAIN DESCRIPTION - ONSET: ONSET: GRADUAL

## 2023-04-18 ASSESSMENT — PAIN DESCRIPTION - FREQUENCY: FREQUENCY: INTERMITTENT

## 2023-04-18 ASSESSMENT — PAIN - FUNCTIONAL ASSESSMENT
PAIN_FUNCTIONAL_ASSESSMENT: PREVENTS OR INTERFERES SOME ACTIVE ACTIVITIES AND ADLS
PAIN_FUNCTIONAL_ASSESSMENT: ACTIVITIES ARE NOT PREVENTED

## 2023-04-18 NOTE — PROGRESS NOTES
ACUTE OCCUPATIONAL THERAPY GOALS:   (Developed with and agreed upon by patient and/or caregiver.)  1. Patient will complete total body bathing and dressing with MODIFIED INDEPENDENCE and adaptive equipment as needed. 2. Patient will complete toileting with INDEPENDENCE. 3. Patient will complete grooming ADL standing at sink with INDEPENDENCE.  4. Patient will tolerate 25 minutes of OT treatment with 1-2 rest breaks to increase activity tolerance for ADLs. 5. Patient will complete functional transfers with MODIFIED INDEPENDENCE and adaptive equipment as needed. Timeframe: 7 visits     OCCUPATIONAL THERAPY Initial Assessment and Daily Note       OT Visit Days: 1  Acknowledge Orders  Time  OT Charge Capture  Rehab Caseload Tracker      RUE in [facility]    Nimo Luna is a [de-identified] y.o. female   PRIMARY DIAGNOSIS: Osteoarthritis of right shoulder  Primary osteoarthritis of right shoulder [M19.011]  Tear of right rotator cuff, unspecified tear extent, unspecified whether traumatic [M75.101]  Osteoarthritis of right shoulder, unspecified osteoarthritis type [M19.011]  Procedure(s) (LRB):  RIGHT SHOULDER TOTAL ARTHROPLASTY REVERSE (Right)  1 Day Post-Op  Reason for Referral: Pain in Right Shoulder (M25.511)  Stiffness of Right Shoulder, Not elsewhere classified (M25.611)  Inpatient: Payor: MEDICARE / Plan: MEDICARE PART A AND B / Product Type: *No Product type* /     ASSESSMENT:     REHAB RECOMMENDATIONS:   Recommendation to date pending progress:  [address] [place] [address] prior to Outpatient Physical Therapy     Equipment:    None--pt has SPC and SC at home     ASSESSMENT:  Ms. Hawk Poole is an [de-identified] y/o female presents with R reverse TSA with Dr. Riki Berry and Jonathan Langleyin is now in sling. At baseline pt lives with her , is independent all ADLs and drives. Today pt presents with decreased ROM in RUE and balance impacting ADLs.  Pt overall SBA SPC for functional transfers and mobility of household distances in
ACUTE PHYSICAL THERAPY GOALS:   (Developed with and agreed upon by patient and/or caregiver.)  Pt will ambulate 500 ft Mod (I) with use of LRAD, no LOB/miss-steps and breaks as needed in 7 therapy sessions. Pt will ascend/ descend 10 stairs Mod (I) c use of LRAD, no LOB/miss-steps and breaks as needed in 7 therapy sessions. Pt will perform standing dynamic balance activities with minimal postural sway in 7 therapy sessions. Pt will tolerate multiple sets and reps of BLE exercises in 7 therapy sessions. PHYSICAL THERAPY Initial Assessment, Daily Note, and AM  (Link to Caseload Tracking: PT Visit Days : 1  Acknowledge Orders  Time In/Out  PT Charge Capture  Rehab Caseload Tracker    Chelsie Eaton is a [de-identified] y.o. female   PRIMARY DIAGNOSIS: Osteoarthritis of right shoulder  Primary osteoarthritis of right shoulder [M19.011]  Tear of right rotator cuff, unspecified tear extent, unspecified whether traumatic [M75.101]  Osteoarthritis of right shoulder, unspecified osteoarthritis type [M19.011]  Procedure(s) (LRB):  RIGHT SHOULDER TOTAL ARTHROPLASTY REVERSE (Right)  1 Day Post-Op  Reason for Referral: Pain in Right Shoulder (M25.511)  Stiffness of Right Shoulder, Not elsewhere classified (M25.611)  Inpatient: Payor: MEDICARE / Plan: MEDICARE PART A AND B / Product Type: *No Product type* /     ASSESSMENT:     REHAB RECOMMENDATIONS:   Recommendation to date pending progress:  Setting:  Home Health Therapy  Vs OP PT    Equipment:    None         ASSESSMENT:  Ms. Reymundo Beck Is a [de-identified] y.o. female presenting to PT POD 1 s/p Right Reverse TSA. At time of initial evaluation, pt presents just slightly below baseline LOF with expected post-op deficits in balance, gait and activity tolerance limiting her overall functional mobility. Today, pt performed all mobility with SB/CG(A), inc time and cueing while requiring additional use of SPC/ gait belt for ambulation of 500'x1 and ascending/ descending 10 stairs.  Of note,
Patient given discharge instructions verbally and via handout. Patient states an understanding of general instructions, medications, and follow-ups. Patient discharged to home with family with all belongings accounted for.
TRANSFER - IN REPORT:    Verbal report received from [facility] on [employer]  being received from [facility] for routine progression of patient care      Report consisted of patient's Situation, Background, Assessment and   Recommendations(SBAR). Information from the following report(s) Nurse Handoff Report was reviewed with the receiving nurse. Opportunity for questions and clarification was provided. Assessment completed upon patient's arrival to unit and care assumed.
HGB 12.8 08/08/2022 04:15 AM              Physical Exam:  No significant changes. Dressing clean and dry. NVI with FROM wrist and digits. Denies sensory deficit. Assessment:      Principal Problem:    Osteoarthritis of right shoulder  Active Problems:    Osteoarthritis of right shoulder, unspecified osteoarthritis type  Resolved Problems:    * No resolved hospital problems.  *       Plan:Discharge     Continue PT/OT/Rehab  Consult:           Signed By: Elodia Amaral MD

## 2023-04-18 NOTE — DISCHARGE SUMMARY
Juana Dunne 19              Discharged to: Home    Discharge instructions:  - Anticoagulate with: ASA 81 mg by mouth BID for 30 days  -Resume pre hospital diet             -Resume home medications per medical continuation form     -May remove sling to ROM elbow and then replace sling.  -Follow up in office as scheduled       Signed:  Hung Hunter MD  4/18/2023  12:55 PM

## (undated) DEVICE — PACK PROCEDURE SURG TOT KNEE

## (undated) DEVICE — GLOVE ORANGE PI 8   MSG9080

## (undated) DEVICE — INTENDED FOR TISSUE SEPARATION, AND OTHER PROCEDURES THAT REQUIRE A SHARP SURGICAL BLADE TO PUNCTURE OR CUT.: Brand: BARD-PARKER SAFETY BLADES SIZE 10, STERILE

## (undated) DEVICE — BANDAGE COBAN 4 IN COMPR W4INXL5YD FOAM COHESIVE QUIK STK SELF ADH SFT

## (undated) DEVICE — GLOVE ORANGE PI 7 1/2   MSG9075

## (undated) DEVICE — HANDPIECE SET WITH COAXIAL HIGH FLOW TIP AND SUCTION TUBE: Brand: INTERPULSE

## (undated) DEVICE — SOLUTION IRRIG 3000ML 0.9% SOD CHL FLX CONT 0797208] ICU MEDICAL INC]

## (undated) DEVICE — INTENDED FOR TISSUE SEPARATION, AND OTHER PROCEDURES THAT REQUIRE A SHARP SURGICAL BLADE TO PUNCTURE OR CUT.: Brand: BARD-PARKER ® STAINLESS STEEL BLADES

## (undated) DEVICE — SUTURE VCRL SZ 2-0 L36IN ABSRB UD L36MM CT-1 1/2 CIR J945H

## (undated) DEVICE — Device

## (undated) DEVICE — DRAPE,TOP,102X53,STERILE: Brand: MEDLINE

## (undated) DEVICE — SOLUTION IRRIG 3000ML 0.9% SOD CHL USP UROMATIC PLAS CONT

## (undated) DEVICE — BANDAGE COMPR SELF ADH 5 YDX6 IN TAN STRL PREMIERPRO LF

## (undated) DEVICE — PAD,ABDOMINAL,5"X9",ST,LF,25/BX: Brand: MEDLINE INDUSTRIES, INC.

## (undated) DEVICE — DRAPE,SHOULDER,BEACH CHAIR,STERILE: Brand: MEDLINE

## (undated) DEVICE — DRESSING,GAUZE,XEROFORM,CURAD,1"X8",ST: Brand: CURAD

## (undated) DEVICE — ABDOMINAL PAD: Brand: DERMACEA

## (undated) DEVICE — SURGICAL PROCEDURE PACK BASIC ST FRANCIS

## (undated) DEVICE — 2000CC GUARDIAN II: Brand: GUARDIAN

## (undated) DEVICE — DRAPE TBL W72XH34IN D30IN SGL PC DISPOSABLE

## (undated) DEVICE — NDL SPNE QNCKE 18GX3.5IN LF --

## (undated) DEVICE — KIT CHAIR TRIMANO FOAM W/ SUPP ARM DRP ERGONOMICALLY DESIGNED

## (undated) DEVICE — CURETTE BNE CEM 10IN DISP --

## (undated) DEVICE — OCCLUSIVE GAUZE STRIP,3% BISMUTH TRIBROMOPHENATE IN PETROLATUM BLEND: Brand: XEROFORM

## (undated) DEVICE — SYRINGE MED 50ML LUERLOCK TIP

## (undated) DEVICE — MEDI-VAC YANKAUER SUCTION HANDLE W/BULBOUS TIP: Brand: CARDINAL HEALTH

## (undated) DEVICE — STOCKINETTE ORTH W9XL36IN COT 2 PLY HLLW FOR HANDLING LMB

## (undated) DEVICE — BIT DRL DIA2.7MM PERIPH SCR REUSE FOR COMPHSVE REV SHLDR

## (undated) DEVICE — POWDER SURG CELLERATE RX 1 GM HYDROL COLLEGEN

## (undated) DEVICE — ELECTRODE PT RET AD L9FT HI MOIST COND ADH HYDRGEL CORDED

## (undated) DEVICE — SYR BULB 60ML IRRIGATION -- CONVERT TO ITEM 116413

## (undated) DEVICE — BUTTON SWITCH PENCIL BLADE ELECTRODE, HOLSTER: Brand: EDGE

## (undated) DEVICE — GLOVE SURG SZ 85 L12IN FNGR THK79MIL GRN LTX FREE

## (undated) DEVICE — 2108 SERIES SAGITTAL BLADE, GROUND (18.5 X 1.32 X 86.0MM)

## (undated) DEVICE — SUTURE VCRL SZ 0 L36IN ABSRB UD L36MM CT-1 1/2 CIR J946H

## (undated) DEVICE — (D)PREP SKN CHLRAPRP APPL 26ML -- CONVERT TO ITEM 371833

## (undated) DEVICE — 3000CC GUARDIAN II: Brand: GUARDIAN

## (undated) DEVICE — RESTRAINT POS UNIV HD NK ALIGN DISP FOR SHLDR PROC

## (undated) DEVICE — APPLICATOR MEDICATED 26 CC SOLUTION HI LT ORNG CHLORAPREP

## (undated) DEVICE — SUTURE VCRL SZ 1 L27IN ABSRB UD L36MM CP-1 1/2 CIR REV CUT J268H

## (undated) DEVICE — GARMENT,MEDLINE,DVT,INT,CALF,MED, GEN2: Brand: MEDLINE

## (undated) DEVICE — FAN SPRAY KIT: Brand: PULSAVAC®

## (undated) DEVICE — AMD ANTIMICROBIAL GAUZE SPONGES,12 PLY USP TYPE VII, 0.2% POLYHEXAMETHYLENE BIGUANIDE HCI (PHMB): Brand: CURITY

## (undated) DEVICE — STERILE TETRA-FLEX CF LF, 6IN X 11 YD: Brand: TETRA-FLEX™ CF

## (undated) DEVICE — NEEDLE SUT 1/2 CIR TAPR TIP MAYO NONSTERILE SZ 5

## (undated) DEVICE — T4 HOOD

## (undated) DEVICE — SLIM BODY SKIN STAPLER: Brand: APPOSE ULC

## (undated) DEVICE — DRAPE,U/SHT,SPLIT,FILM,60X84,STERILE: Brand: MEDLINE

## (undated) DEVICE — SUTURE VCRL SZ 2-0 L27IN ABSRB UD L36MM CP-1 1/2 CIR REV J266H

## (undated) DEVICE — SYR 50ML LR LCK 1ML GRAD NSAF --

## (undated) DEVICE — SUTURE FIBERWIRE SZ 2 W/ TAPERED NEEDLE BLUE L38IN NONABSORB BLU L26.5MM 1/2 CIRCLE AR7200

## (undated) DEVICE — SUTURE VCRL SZ 0 L27IN ABSRB UD L36MM CP-1 1/2 CIR REV CUT J267H

## (undated) DEVICE — INTENDED FOR TISSUE SEPARATION, AND OTHER PROCEDURES THAT REQUIRE A SHARP SURGICAL BLADE TO PUNCTURE OR CUT.: Brand: BARD-PARKER SAFETY BLADES SIZE 15, STERILE

## (undated) DEVICE — HOOD: Brand: FLYTE

## (undated) DEVICE — BASIC SINGLE BASIN-LF: Brand: MEDLINE INDUSTRIES, INC.

## (undated) DEVICE — 3M™ IOBAN™ 2 ANTIMICROBIAL INCISE DRAPE 6650EZ: Brand: IOBAN™ 2

## (undated) DEVICE — GLOVE SURG SZ 8 L12IN FNGR THK79MIL GRN LTX FREE

## (undated) DEVICE — SOLUTION IRRIG 1000ML 0.9% SOD CHL USP POUR PLAS BTL

## (undated) DEVICE — REM POLYHESIVE ADULT PATIENT RETURN ELECTRODE: Brand: VALLEYLAB

## (undated) DEVICE — SUTURE VCRL SZ 1 L27IN ABSRB UD CT-1 L36MM 1/2 CIR J261H

## (undated) DEVICE — SLING ARM M PCH 8X17IN STRP 2 37IN LT BLU COT FOAM SHLDR PD

## (undated) DEVICE — ZIMMER® STERILE DISPOSABLE TOURNIQUET CUFF WITH PROTECTIVE SLEEVE, DUAL PORT, SINGLE BLADDER, 34 IN. (86 CM)